# Patient Record
Sex: MALE | Race: WHITE | NOT HISPANIC OR LATINO | Employment: OTHER | URBAN - METROPOLITAN AREA
[De-identification: names, ages, dates, MRNs, and addresses within clinical notes are randomized per-mention and may not be internally consistent; named-entity substitution may affect disease eponyms.]

---

## 2017-05-10 ENCOUNTER — APPOINTMENT (OUTPATIENT)
Dept: MRI IMAGING | Facility: HOSPITAL | Age: 67
End: 2017-05-10
Attending: EMERGENCY MEDICINE

## 2017-05-10 ENCOUNTER — HOSPITAL ENCOUNTER (OUTPATIENT)
Facility: HOSPITAL | Age: 67
Setting detail: OBSERVATION
Discharge: HOME | End: 2017-05-13
Attending: EMERGENCY MEDICINE | Admitting: INTERNAL MEDICINE

## 2017-05-10 ENCOUNTER — APPOINTMENT (OUTPATIENT)
Dept: GENERAL RADIOLOGY | Facility: HOSPITAL | Age: 67
End: 2017-05-10

## 2017-05-10 DIAGNOSIS — E11.69 DIABETES MELLITUS TYPE 2 IN OBESE (HCC): ICD-10-CM

## 2017-05-10 DIAGNOSIS — I65.1 BASILAR ARTERY STENOSIS: ICD-10-CM

## 2017-05-10 DIAGNOSIS — E66.9 DIABETES MELLITUS TYPE 2 IN OBESE (HCC): ICD-10-CM

## 2017-05-10 DIAGNOSIS — I10 HYPERTENSION, ESSENTIAL: ICD-10-CM

## 2017-05-10 DIAGNOSIS — Z74.09 IMPAIRED MOBILITY AND ADLS: ICD-10-CM

## 2017-05-10 DIAGNOSIS — Z74.09 IMPAIRED FUNCTIONAL MOBILITY, BALANCE, GAIT, AND ENDURANCE: ICD-10-CM

## 2017-05-10 DIAGNOSIS — G45.9 TRANSIENT CEREBRAL ISCHEMIA, UNSPECIFIED TYPE: Primary | ICD-10-CM

## 2017-05-10 DIAGNOSIS — Z78.9 IMPAIRED MOBILITY AND ADLS: ICD-10-CM

## 2017-05-10 DIAGNOSIS — N28.9 RENAL INSUFFICIENCY: ICD-10-CM

## 2017-05-10 LAB
ALBUMIN SERPL-MCNC: 4.5 G/DL (ref 3.2–4.8)
ALBUMIN/GLOB SERPL: 1.4 G/DL (ref 1.5–2.5)
ALP SERPL-CCNC: 64 U/L (ref 25–100)
ALT SERPL W P-5'-P-CCNC: 21 U/L (ref 7–40)
ANION GAP SERPL CALCULATED.3IONS-SCNC: -5 MMOL/L (ref 3–11)
AST SERPL-CCNC: 24 U/L (ref 0–33)
BASOPHILS # BLD AUTO: 0.04 10*3/MM3 (ref 0–0.2)
BASOPHILS NFR BLD AUTO: 0.4 % (ref 0–1)
BILIRUB SERPL-MCNC: 0.4 MG/DL (ref 0.3–1.2)
BILIRUB UR QL STRIP: ABNORMAL
BUN BLD-MCNC: 22 MG/DL (ref 9–23)
BUN/CREAT SERPL: 12.9 (ref 7–25)
CALCIUM SPEC-SCNC: 10.3 MG/DL (ref 8.7–10.4)
CHLORIDE SERPL-SCNC: 106 MMOL/L (ref 99–109)
CLARITY UR: CLEAR
CO2 SERPL-SCNC: 37 MMOL/L (ref 20–31)
COLOR UR: YELLOW
CREAT BLD-MCNC: 1.7 MG/DL (ref 0.6–1.3)
DEPRECATED RDW RBC AUTO: 43.6 FL (ref 37–54)
EOSINOPHIL # BLD AUTO: 0.21 10*3/MM3 (ref 0.1–0.3)
EOSINOPHIL NFR BLD AUTO: 2.3 % (ref 0–3)
ERYTHROCYTE [DISTWIDTH] IN BLOOD BY AUTOMATED COUNT: 12.8 % (ref 11.3–14.5)
GFR SERPL CREATININE-BSD FRML MDRD: 41 ML/MIN/1.73
GLOBULIN UR ELPH-MCNC: 3.2 GM/DL
GLUCOSE BLD-MCNC: 88 MG/DL (ref 70–100)
GLUCOSE BLDC GLUCOMTR-MCNC: 101 MG/DL (ref 70–130)
GLUCOSE BLDC GLUCOMTR-MCNC: 101 MG/DL (ref 70–130)
GLUCOSE BLDC GLUCOMTR-MCNC: 115 MG/DL (ref 70–130)
GLUCOSE UR STRIP-MCNC: NEGATIVE MG/DL
HCT VFR BLD AUTO: 43.8 % (ref 38.9–50.9)
HGB BLD-MCNC: 14.7 G/DL (ref 13.1–17.5)
HGB UR QL STRIP.AUTO: NEGATIVE
HOLD SPECIMEN: NORMAL
HOLD SPECIMEN: NORMAL
IMM GRANULOCYTES # BLD: 0.03 10*3/MM3 (ref 0–0.03)
IMM GRANULOCYTES NFR BLD: 0.3 % (ref 0–0.6)
KETONES UR QL STRIP: NEGATIVE
LEUKOCYTE ESTERASE UR QL STRIP.AUTO: NEGATIVE
LYMPHOCYTES # BLD AUTO: 3.06 10*3/MM3 (ref 0.6–4.8)
LYMPHOCYTES NFR BLD AUTO: 33.4 % (ref 24–44)
MAGNESIUM SERPL-MCNC: 1.8 MG/DL (ref 1.3–2.7)
MCH RBC QN AUTO: 31.4 PG (ref 27–31)
MCHC RBC AUTO-ENTMCNC: 33.6 G/DL (ref 32–36)
MCV RBC AUTO: 93.6 FL (ref 80–99)
MONOCYTES # BLD AUTO: 0.66 10*3/MM3 (ref 0–1)
MONOCYTES NFR BLD AUTO: 7.2 % (ref 0–12)
NEUTROPHILS # BLD AUTO: 5.17 10*3/MM3 (ref 1.5–8.3)
NEUTROPHILS NFR BLD AUTO: 56.4 % (ref 41–71)
NITRITE UR QL STRIP: NEGATIVE
PH UR STRIP.AUTO: 5.5 [PH] (ref 5–8)
PLATELET # BLD AUTO: 217 10*3/MM3 (ref 150–450)
PMV BLD AUTO: 10.4 FL (ref 6–12)
POTASSIUM BLD-SCNC: 4.7 MMOL/L (ref 3.5–5.5)
PROT SERPL-MCNC: 7.7 G/DL (ref 5.7–8.2)
PROT UR QL STRIP: NEGATIVE
RBC # BLD AUTO: 4.68 10*6/MM3 (ref 4.2–5.76)
SODIUM BLD-SCNC: 138 MMOL/L (ref 132–146)
SP GR UR STRIP: >=1.03 (ref 1–1.03)
TROPONIN I SERPL-MCNC: 0 NG/ML (ref 0–0.07)
UROBILINOGEN UR QL STRIP: ABNORMAL
WBC NRBC COR # BLD: 9.17 10*3/MM3 (ref 3.5–10.8)
WHOLE BLOOD HOLD SPECIMEN: NORMAL
WHOLE BLOOD HOLD SPECIMEN: NORMAL

## 2017-05-10 PROCEDURE — G0378 HOSPITAL OBSERVATION PER HR: HCPCS

## 2017-05-10 PROCEDURE — 81003 URINALYSIS AUTO W/O SCOPE: CPT | Performed by: EMERGENCY MEDICINE

## 2017-05-10 PROCEDURE — 82962 GLUCOSE BLOOD TEST: CPT

## 2017-05-10 PROCEDURE — 84484 ASSAY OF TROPONIN QUANT: CPT

## 2017-05-10 PROCEDURE — 99220 PR INITIAL OBSERVATION CARE/DAY 70 MINUTES: CPT | Performed by: FAMILY MEDICINE

## 2017-05-10 PROCEDURE — 80053 COMPREHEN METABOLIC PANEL: CPT | Performed by: EMERGENCY MEDICINE

## 2017-05-10 PROCEDURE — 70548 MR ANGIOGRAPHY NECK W/DYE: CPT

## 2017-05-10 PROCEDURE — A9577 INJ MULTIHANCE: HCPCS | Performed by: EMERGENCY MEDICINE

## 2017-05-10 PROCEDURE — 85025 COMPLETE CBC W/AUTO DIFF WBC: CPT | Performed by: EMERGENCY MEDICINE

## 2017-05-10 PROCEDURE — 70544 MR ANGIOGRAPHY HEAD W/O DYE: CPT

## 2017-05-10 PROCEDURE — 93005 ELECTROCARDIOGRAM TRACING: CPT

## 2017-05-10 PROCEDURE — 0 GADOBENATE DIMEGLUMINE 529 MG/ML SOLUTION: Performed by: EMERGENCY MEDICINE

## 2017-05-10 PROCEDURE — 83735 ASSAY OF MAGNESIUM: CPT | Performed by: EMERGENCY MEDICINE

## 2017-05-10 PROCEDURE — 99283 EMERGENCY DEPT VISIT LOW MDM: CPT

## 2017-05-10 PROCEDURE — 70551 MRI BRAIN STEM W/O DYE: CPT

## 2017-05-10 PROCEDURE — 71010 HC CHEST PA OR AP: CPT

## 2017-05-10 RX ORDER — PRASUGREL 10 MG/1
10 TABLET, FILM COATED ORAL DAILY
Status: DISCONTINUED | OUTPATIENT
Start: 2017-05-11 | End: 2017-05-13 | Stop reason: HOSPADM

## 2017-05-10 RX ORDER — HYDROCHLOROTHIAZIDE 25 MG/1
25 TABLET ORAL DAILY
COMMUNITY

## 2017-05-10 RX ORDER — ASPIRIN 325 MG
325 TABLET ORAL DAILY
Status: DISCONTINUED | OUTPATIENT
Start: 2017-05-11 | End: 2017-05-13 | Stop reason: HOSPADM

## 2017-05-10 RX ORDER — OMEPRAZOLE 20 MG/1
20 CAPSULE, DELAYED RELEASE ORAL DAILY
Status: ON HOLD | COMMUNITY
End: 2017-06-30

## 2017-05-10 RX ORDER — SODIUM CHLORIDE 0.9 % (FLUSH) 0.9 %
10 SYRINGE (ML) INJECTION AS NEEDED
Status: DISCONTINUED | OUTPATIENT
Start: 2017-05-10 | End: 2017-05-10

## 2017-05-10 RX ORDER — TRAZODONE HYDROCHLORIDE 50 MG/1
75 TABLET ORAL NIGHTLY
Status: DISCONTINUED | OUTPATIENT
Start: 2017-05-10 | End: 2017-05-13 | Stop reason: HOSPADM

## 2017-05-10 RX ORDER — ASPIRIN 325 MG
325 TABLET ORAL ONCE
Status: COMPLETED | OUTPATIENT
Start: 2017-05-10 | End: 2017-05-10

## 2017-05-10 RX ORDER — ASPIRIN 300 MG/1
300 SUPPOSITORY RECTAL DAILY
Status: DISCONTINUED | OUTPATIENT
Start: 2017-05-11 | End: 2017-05-13 | Stop reason: HOSPADM

## 2017-05-10 RX ORDER — SODIUM CHLORIDE 0.9 % (FLUSH) 0.9 %
1-10 SYRINGE (ML) INJECTION AS NEEDED
Status: DISCONTINUED | OUTPATIENT
Start: 2017-05-10 | End: 2017-05-10

## 2017-05-10 RX ORDER — PANTOPRAZOLE SODIUM 40 MG/1
40 TABLET, DELAYED RELEASE ORAL
Status: DISCONTINUED | OUTPATIENT
Start: 2017-05-11 | End: 2017-05-13 | Stop reason: HOSPADM

## 2017-05-10 RX ORDER — GLIPIZIDE 10 MG/1
10 TABLET ORAL
COMMUNITY

## 2017-05-10 RX ORDER — LOSARTAN POTASSIUM 50 MG/1
100 TABLET ORAL DAILY
COMMUNITY
End: 2017-07-11 | Stop reason: DRUGHIGH

## 2017-05-10 RX ORDER — ASPIRIN 325 MG
325 TABLET ORAL DAILY
Status: DISCONTINUED | OUTPATIENT
Start: 2017-05-11 | End: 2017-05-10

## 2017-05-10 RX ORDER — TAMSULOSIN HYDROCHLORIDE 0.4 MG/1
0.4 CAPSULE ORAL NIGHTLY
Status: DISCONTINUED | OUTPATIENT
Start: 2017-05-10 | End: 2017-05-13 | Stop reason: HOSPADM

## 2017-05-10 RX ORDER — SODIUM CHLORIDE 0.9 % (FLUSH) 0.9 %
1-10 SYRINGE (ML) INJECTION AS NEEDED
Status: DISCONTINUED | OUTPATIENT
Start: 2017-05-10 | End: 2017-05-13 | Stop reason: HOSPADM

## 2017-05-10 RX ORDER — METOPROLOL TARTRATE 50 MG/1
50 TABLET, FILM COATED ORAL 2 TIMES DAILY
COMMUNITY
End: 2017-05-13 | Stop reason: HOSPADM

## 2017-05-10 RX ORDER — NICOTINE POLACRILEX 4 MG
15 LOZENGE BUCCAL
Status: DISCONTINUED | OUTPATIENT
Start: 2017-05-10 | End: 2017-05-13 | Stop reason: HOSPADM

## 2017-05-10 RX ORDER — PRASUGREL 10 MG/1
10 TABLET, FILM COATED ORAL DAILY
Status: ON HOLD | COMMUNITY
End: 2017-06-30

## 2017-05-10 RX ORDER — ATORVASTATIN CALCIUM 40 MG/1
80 TABLET, FILM COATED ORAL NIGHTLY
Status: DISCONTINUED | OUTPATIENT
Start: 2017-05-10 | End: 2017-05-10

## 2017-05-10 RX ORDER — TRAZODONE HYDROCHLORIDE 150 MG/1
150 TABLET ORAL NIGHTLY
COMMUNITY
End: 2017-07-11 | Stop reason: DRUGHIGH

## 2017-05-10 RX ORDER — TAMSULOSIN HYDROCHLORIDE 0.4 MG/1
0.4 CAPSULE ORAL NIGHTLY
COMMUNITY

## 2017-05-10 RX ORDER — ATORVASTATIN CALCIUM 40 MG/1
40 TABLET, FILM COATED ORAL NIGHTLY
Status: DISCONTINUED | OUTPATIENT
Start: 2017-05-10 | End: 2017-05-11

## 2017-05-10 RX ORDER — DOXYCYCLINE HYCLATE 100 MG/1
100 CAPSULE ORAL 2 TIMES DAILY
COMMUNITY
End: 2017-05-13 | Stop reason: HOSPADM

## 2017-05-10 RX ORDER — ROSUVASTATIN CALCIUM 10 MG/1
10 TABLET, COATED ORAL DAILY
Status: ON HOLD | COMMUNITY
End: 2017-05-13

## 2017-05-10 RX ORDER — ASPIRIN 300 MG/1
300 SUPPOSITORY RECTAL DAILY
Status: DISCONTINUED | OUTPATIENT
Start: 2017-05-11 | End: 2017-05-10

## 2017-05-10 RX ORDER — DEXTROSE MONOHYDRATE 25 G/50ML
25 INJECTION, SOLUTION INTRAVENOUS
Status: DISCONTINUED | OUTPATIENT
Start: 2017-05-10 | End: 2017-05-13 | Stop reason: HOSPADM

## 2017-05-10 RX ADMIN — TAMSULOSIN HYDROCHLORIDE 0.4 MG: 0.4 CAPSULE ORAL at 23:23

## 2017-05-10 RX ADMIN — GADOBENATE DIMEGLUMINE 20 ML: 529 INJECTION, SOLUTION INTRAVENOUS at 16:12

## 2017-05-10 RX ADMIN — TRAZODONE HYDROCHLORIDE 75 MG: 50 TABLET ORAL at 23:23

## 2017-05-10 RX ADMIN — ATORVASTATIN CALCIUM 40 MG: 40 TABLET, FILM COATED ORAL at 21:25

## 2017-05-10 RX ADMIN — ASPIRIN 325 MG ORAL TABLET 325 MG: 325 PILL ORAL at 18:54

## 2017-05-11 ENCOUNTER — APPOINTMENT (OUTPATIENT)
Dept: CARDIOLOGY | Facility: HOSPITAL | Age: 67
End: 2017-05-11
Attending: FAMILY MEDICINE

## 2017-05-11 PROBLEM — I65.1 BASILAR ARTERY STENOSIS: Status: ACTIVE | Noted: 2017-05-11

## 2017-05-11 LAB
ANION GAP SERPL CALCULATED.3IONS-SCNC: 5 MMOL/L (ref 3–11)
ARTICHOKE IGE QN: 97 MG/DL (ref 0–130)
BASOPHILS # BLD AUTO: 0.04 10*3/MM3 (ref 0–0.2)
BASOPHILS NFR BLD AUTO: 0.5 % (ref 0–1)
BH CV ECHO MEAS - AO ROOT AREA (BSA CORRECTED): 1.4
BH CV ECHO MEAS - AO ROOT AREA: 8 CM^2
BH CV ECHO MEAS - AO ROOT DIAM: 3.2 CM
BH CV ECHO MEAS - BSA(HAYCOCK): 2.4 M^2
BH CV ECHO MEAS - BSA: 2.2 M^2
BH CV ECHO MEAS - BZI_BMI: 37.3 KILOGRAMS/M^2
BH CV ECHO MEAS - BZI_METRIC_HEIGHT: 172.7 CM
BH CV ECHO MEAS - BZI_METRIC_WEIGHT: 111.1 KG
BH CV ECHO MEAS - CONTRAST EF (2CH): 51.6 ML/M^2
BH CV ECHO MEAS - CONTRAST EF 4CH: 58.3 ML/M^2
BH CV ECHO MEAS - EDV(CUBED): 93.6 ML
BH CV ECHO MEAS - EDV(MOD-SP2): 157 ML
BH CV ECHO MEAS - EDV(MOD-SP4): 168 ML
BH CV ECHO MEAS - EDV(TEICH): 94.4 ML
BH CV ECHO MEAS - EF(CUBED): 68.8 %
BH CV ECHO MEAS - EF(MOD-SP2): 51.6 %
BH CV ECHO MEAS - EF(MOD-SP4): 58.3 %
BH CV ECHO MEAS - EF(TEICH): 60.5 %
BH CV ECHO MEAS - ESV(CUBED): 29.2 ML
BH CV ECHO MEAS - ESV(MOD-SP2): 76 ML
BH CV ECHO MEAS - ESV(MOD-SP4): 70 ML
BH CV ECHO MEAS - ESV(TEICH): 37.3 ML
BH CV ECHO MEAS - FS: 32.2 %
BH CV ECHO MEAS - IVS/LVPW: 0.85
BH CV ECHO MEAS - IVSD: 1.3 CM
BH CV ECHO MEAS - LA DIMENSION: 4.2 CM
BH CV ECHO MEAS - LA/AO: 1.3
BH CV ECHO MEAS - LAT PEAK E' VEL: 8.1 CM/SEC
BH CV ECHO MEAS - LV DIASTOLIC VOL/BSA (35-75): 75.4 ML/M^2
BH CV ECHO MEAS - LV IVRT: 0.11 SEC
BH CV ECHO MEAS - LV MASS(C)D: 249.1 GRAMS
BH CV ECHO MEAS - LV MASS(C)DI: 111.8 GRAMS/M^2
BH CV ECHO MEAS - LV SYSTOLIC VOL/BSA (12-30): 31.4 ML/M^2
BH CV ECHO MEAS - LVIDD: 4.5 CM
BH CV ECHO MEAS - LVIDS: 3.1 CM
BH CV ECHO MEAS - LVLD AP2: 9 CM
BH CV ECHO MEAS - LVLD AP4: 9.1 CM
BH CV ECHO MEAS - LVLS AP2: 7.9 CM
BH CV ECHO MEAS - LVLS AP4: 8.2 CM
BH CV ECHO MEAS - LVOT AREA (M): 3.1 CM^2
BH CV ECHO MEAS - LVOT AREA: 3.1 CM^2
BH CV ECHO MEAS - LVOT DIAM: 2 CM
BH CV ECHO MEAS - LVPWD: 1.5 CM
BH CV ECHO MEAS - MED PEAK E' VEL: 4.93 CM/SEC
BH CV ECHO MEAS - MV A MAX VEL: 99.3 CM/SEC
BH CV ECHO MEAS - MV DEC TIME: 0.24 SEC
BH CV ECHO MEAS - MV E MAX VEL: 65.4 CM/SEC
BH CV ECHO MEAS - MV E/A: 0.66
BH CV ECHO MEAS - PA ACC SLOPE: 908.5 CM/SEC^2
BH CV ECHO MEAS - PA ACC TIME: 0.1 SEC
BH CV ECHO MEAS - PA PR(ACCEL): 35.4 MMHG
BH CV ECHO MEAS - SI(CUBED): 28.9 ML/M^2
BH CV ECHO MEAS - SI(MOD-SP2): 36.4 ML/M^2
BH CV ECHO MEAS - SI(MOD-SP4): 44 ML/M^2
BH CV ECHO MEAS - SI(TEICH): 25.6 ML/M^2
BH CV ECHO MEAS - SV(CUBED): 64.4 ML
BH CV ECHO MEAS - SV(MOD-SP2): 81 ML
BH CV ECHO MEAS - SV(MOD-SP4): 98 ML
BH CV ECHO MEAS - SV(TEICH): 57.1 ML
BH CV ECHO MEAS - TAPSE (>1.6): 2.9 CM2
BH CV XLRA - RV BASE: 4.8 CM
BH CV XLRA - RV LENGTH: 7.9 CM
BH CV XLRA - RV MID: 4.1 CM
BH CV XLRA - TDI S': 13.7 CM/SEC
BH CV XLRA MEAS LEFT CCA RATIO VEL: 73.5 CM/SEC
BH CV XLRA MEAS LEFT DIST CCA EDV: 16.5 CM/SEC
BH CV XLRA MEAS LEFT DIST CCA PSV: 73.5 CM/SEC
BH CV XLRA MEAS LEFT DIST ICA EDV: 12.4 CM/SEC
BH CV XLRA MEAS LEFT DIST ICA PSV: 41.9 CM/SEC
BH CV XLRA MEAS LEFT ICA RATIO VEL: 59.3 CM/SEC
BH CV XLRA MEAS LEFT ICA/CCA RATIO: 0.81
BH CV XLRA MEAS LEFT MID ICA EDV: 16.1 CM/SEC
BH CV XLRA MEAS LEFT MID ICA PSV: 59.3 CM/SEC
BH CV XLRA MEAS LEFT PROX CCA EDV: 18.9 CM/SEC
BH CV XLRA MEAS LEFT PROX CCA PSV: 131 CM/SEC
BH CV XLRA MEAS LEFT PROX ECA PSV: 78.6 CM/SEC
BH CV XLRA MEAS LEFT PROX ICA EDV: 12.6 CM/SEC
BH CV XLRA MEAS LEFT PROX ICA PSV: 57.8 CM/SEC
BH CV XLRA MEAS LEFT PROX SCLA PSV: 99.3 CM/SEC
BH CV XLRA MEAS LEFT VERTEBRAL A PSV: 39.3 CM/SEC
BH CV XLRA MEAS RIGHT DIST CCA EDV: 14.8 CM/SEC
BH CV XLRA MEAS RIGHT DIST CCA PSV: 61.6 CM/SEC
BH CV XLRA MEAS RIGHT DIST ICA EDV: 25.8 CM/SEC
BH CV XLRA MEAS RIGHT DIST ICA PSV: 70.6 CM/SEC
BH CV XLRA MEAS RIGHT MID ICA EDV: 22.7 CM/SEC
BH CV XLRA MEAS RIGHT MID ICA PSV: 63.2 CM/SEC
BH CV XLRA MEAS RIGHT PROX CCA EDV: 15.3 CM/SEC
BH CV XLRA MEAS RIGHT PROX CCA PSV: 68 CM/SEC
BH CV XLRA MEAS RIGHT PROX ECA PSV: 89 CM/SEC
BH CV XLRA MEAS RIGHT PROX ICA EDV: 19 CM/SEC
BH CV XLRA MEAS RIGHT PROX ICA PSV: 56.9 CM/SEC
BH CV XLRA MEAS RIGHT VERTEBRAL A PSV: 36.4 CM/SEC
BUN BLD-MCNC: 22 MG/DL (ref 9–23)
BUN/CREAT SERPL: 13.8 (ref 7–25)
CALCIUM SPEC-SCNC: 9.7 MG/DL (ref 8.7–10.4)
CHLORIDE SERPL-SCNC: 101 MMOL/L (ref 99–109)
CHOLEST SERPL-MCNC: 182 MG/DL (ref 0–200)
CO2 SERPL-SCNC: 29 MMOL/L (ref 20–31)
CREAT BLD-MCNC: 1.6 MG/DL (ref 0.6–1.3)
DEPRECATED RDW RBC AUTO: 43.3 FL (ref 37–54)
E/E' RATIO: 11.5
EOSINOPHIL # BLD AUTO: 0.23 10*3/MM3 (ref 0.1–0.3)
EOSINOPHIL NFR BLD AUTO: 2.9 % (ref 0–3)
ERYTHROCYTE [DISTWIDTH] IN BLOOD BY AUTOMATED COUNT: 12.7 % (ref 11.3–14.5)
GFR SERPL CREATININE-BSD FRML MDRD: 44 ML/MIN/1.73
GLUCOSE BLD-MCNC: 279 MG/DL (ref 70–100)
GLUCOSE BLDC GLUCOMTR-MCNC: 254 MG/DL (ref 70–130)
GLUCOSE BLDC GLUCOMTR-MCNC: 258 MG/DL (ref 70–130)
GLUCOSE BLDC GLUCOMTR-MCNC: 274 MG/DL (ref 70–130)
GLUCOSE BLDC GLUCOMTR-MCNC: 274 MG/DL (ref 70–130)
GLUCOSE BLDC GLUCOMTR-MCNC: 429 MG/DL (ref 70–130)
HBA1C MFR BLD: 9.1 % (ref 4.8–5.6)
HCT VFR BLD AUTO: 42.7 % (ref 38.9–50.9)
HDLC SERPL-MCNC: 33 MG/DL (ref 40–60)
HGB BLD-MCNC: 14.1 G/DL (ref 13.1–17.5)
IMM GRANULOCYTES # BLD: 0.04 10*3/MM3 (ref 0–0.03)
IMM GRANULOCYTES NFR BLD: 0.5 % (ref 0–0.6)
LEFT ATRIUM VOLUME INDEX: 36.3 ML/M2
LV EF 2D ECHO EST: 55 %
LYMPHOCYTES # BLD AUTO: 2.7 10*3/MM3 (ref 0.6–4.8)
LYMPHOCYTES NFR BLD AUTO: 34.6 % (ref 24–44)
MCH RBC QN AUTO: 30.9 PG (ref 27–31)
MCHC RBC AUTO-ENTMCNC: 33 G/DL (ref 32–36)
MCV RBC AUTO: 93.4 FL (ref 80–99)
MONOCYTES # BLD AUTO: 0.51 10*3/MM3 (ref 0–1)
MONOCYTES NFR BLD AUTO: 6.5 % (ref 0–12)
NEUTROPHILS # BLD AUTO: 4.29 10*3/MM3 (ref 1.5–8.3)
NEUTROPHILS NFR BLD AUTO: 55 % (ref 41–71)
PLATELET # BLD AUTO: 190 10*3/MM3 (ref 150–450)
PMV BLD AUTO: 10.7 FL (ref 6–12)
POTASSIUM BLD-SCNC: 4.8 MMOL/L (ref 3.5–5.5)
RBC # BLD AUTO: 4.57 10*6/MM3 (ref 4.2–5.76)
SODIUM BLD-SCNC: 135 MMOL/L (ref 132–146)
TRIGL SERPL-MCNC: 306 MG/DL (ref 0–150)
WBC NRBC COR # BLD: 7.81 10*3/MM3 (ref 3.5–10.8)

## 2017-05-11 PROCEDURE — 80061 LIPID PANEL: CPT | Performed by: FAMILY MEDICINE

## 2017-05-11 PROCEDURE — 25010000002 HEPARIN (PORCINE) PER 1000 UNITS: Performed by: INTERNAL MEDICINE

## 2017-05-11 PROCEDURE — 83036 HEMOGLOBIN GLYCOSYLATED A1C: CPT | Performed by: FAMILY MEDICINE

## 2017-05-11 PROCEDURE — 80048 BASIC METABOLIC PNL TOTAL CA: CPT | Performed by: FAMILY MEDICINE

## 2017-05-11 PROCEDURE — 99205 OFFICE O/P NEW HI 60 MIN: CPT | Performed by: PSYCHIATRY & NEUROLOGY

## 2017-05-11 PROCEDURE — 97165 OT EVAL LOW COMPLEX 30 MIN: CPT

## 2017-05-11 PROCEDURE — 99226 PR SBSQ OBSERVATION CARE/DAY 35 MINUTES: CPT | Performed by: INTERNAL MEDICINE

## 2017-05-11 PROCEDURE — G0108 DIAB MANAGE TRN  PER INDIV: HCPCS | Performed by: REGISTERED NURSE

## 2017-05-11 PROCEDURE — G8978 MOBILITY CURRENT STATUS: HCPCS

## 2017-05-11 PROCEDURE — G0378 HOSPITAL OBSERVATION PER HR: HCPCS

## 2017-05-11 PROCEDURE — 97161 PT EVAL LOW COMPLEX 20 MIN: CPT

## 2017-05-11 PROCEDURE — 93306 TTE W/DOPPLER COMPLETE: CPT

## 2017-05-11 PROCEDURE — 82962 GLUCOSE BLOOD TEST: CPT

## 2017-05-11 PROCEDURE — 85025 COMPLETE CBC W/AUTO DIFF WBC: CPT | Performed by: FAMILY MEDICINE

## 2017-05-11 PROCEDURE — 93880 EXTRACRANIAL BILAT STUDY: CPT

## 2017-05-11 PROCEDURE — G8980 MOBILITY D/C STATUS: HCPCS

## 2017-05-11 PROCEDURE — 63710000001 INSULIN LISPRO (HUMAN) PER 5 UNITS: Performed by: FAMILY MEDICINE

## 2017-05-11 RX ORDER — HEPARIN SODIUM 5000 [USP'U]/ML
5000 INJECTION, SOLUTION INTRAVENOUS; SUBCUTANEOUS EVERY 12 HOURS SCHEDULED
Status: DISCONTINUED | OUTPATIENT
Start: 2017-05-11 | End: 2017-05-13 | Stop reason: HOSPADM

## 2017-05-11 RX ORDER — ROSUVASTATIN CALCIUM 20 MG/1
20 TABLET, COATED ORAL NIGHTLY
Status: DISCONTINUED | OUTPATIENT
Start: 2017-05-11 | End: 2017-05-13 | Stop reason: HOSPADM

## 2017-05-11 RX ADMIN — HEPARIN SODIUM 5000 UNITS: 5000 INJECTION, SOLUTION INTRAVENOUS; SUBCUTANEOUS at 20:15

## 2017-05-11 RX ADMIN — INSULIN LISPRO 4 UNITS: 100 INJECTION, SOLUTION INTRAVENOUS; SUBCUTANEOUS at 08:11

## 2017-05-11 RX ADMIN — PANTOPRAZOLE SODIUM 40 MG: 40 TABLET, DELAYED RELEASE ORAL at 05:53

## 2017-05-11 RX ADMIN — TAMSULOSIN HYDROCHLORIDE 0.4 MG: 0.4 CAPSULE ORAL at 20:15

## 2017-05-11 RX ADMIN — PRASUGREL HYDROCHLORIDE 10 MG: 10 TABLET, FILM COATED ORAL at 08:10

## 2017-05-11 RX ADMIN — ASPIRIN 325 MG ORAL TABLET 325 MG: 325 PILL ORAL at 20:17

## 2017-05-11 RX ADMIN — INSULIN LISPRO 4 UNITS: 100 INJECTION, SOLUTION INTRAVENOUS; SUBCUTANEOUS at 11:59

## 2017-05-11 RX ADMIN — TRAZODONE HYDROCHLORIDE 75 MG: 50 TABLET ORAL at 20:15

## 2017-05-11 RX ADMIN — INSULIN LISPRO 4 UNITS: 100 INJECTION, SOLUTION INTRAVENOUS; SUBCUTANEOUS at 17:19

## 2017-05-11 RX ADMIN — INSULIN LISPRO 4 UNITS: 100 INJECTION, SOLUTION INTRAVENOUS; SUBCUTANEOUS at 20:15

## 2017-05-11 RX ADMIN — ROSUVASTATIN CALCIUM 20 MG: 20 TABLET, FILM COATED ORAL at 20:15

## 2017-05-12 LAB
GLUCOSE BLDC GLUCOMTR-MCNC: 263 MG/DL (ref 70–130)
GLUCOSE BLDC GLUCOMTR-MCNC: 288 MG/DL (ref 70–130)
GLUCOSE BLDC GLUCOMTR-MCNC: 306 MG/DL (ref 70–130)
GLUCOSE BLDC GLUCOMTR-MCNC: 392 MG/DL (ref 70–130)

## 2017-05-12 PROCEDURE — 63710000001 INSULIN LISPRO (HUMAN) PER 5 UNITS: Performed by: NURSE PRACTITIONER

## 2017-05-12 PROCEDURE — 82962 GLUCOSE BLOOD TEST: CPT

## 2017-05-12 PROCEDURE — 99213 OFFICE O/P EST LOW 20 MIN: CPT | Performed by: PSYCHIATRY & NEUROLOGY

## 2017-05-12 PROCEDURE — 25010000002 HEPARIN (PORCINE) PER 1000 UNITS: Performed by: INTERNAL MEDICINE

## 2017-05-12 PROCEDURE — G0378 HOSPITAL OBSERVATION PER HR: HCPCS

## 2017-05-12 PROCEDURE — 99225 PR SBSQ OBSERVATION CARE/DAY 25 MINUTES: CPT | Performed by: NURSE PRACTITIONER

## 2017-05-12 RX ORDER — METOPROLOL TARTRATE 50 MG/1
50 TABLET, FILM COATED ORAL EVERY 12 HOURS SCHEDULED
Status: DISCONTINUED | OUTPATIENT
Start: 2017-05-12 | End: 2017-05-12

## 2017-05-12 RX ORDER — LOSARTAN POTASSIUM 50 MG/1
50 TABLET ORAL
Status: DISCONTINUED | OUTPATIENT
Start: 2017-05-13 | End: 2017-05-13 | Stop reason: HOSPADM

## 2017-05-12 RX ORDER — AMLODIPINE BESYLATE 5 MG/1
5 TABLET ORAL
Status: DISCONTINUED | OUTPATIENT
Start: 2017-05-12 | End: 2017-05-13 | Stop reason: HOSPADM

## 2017-05-12 RX ORDER — LOSARTAN POTASSIUM 50 MG/1
50 TABLET ORAL ONCE
Status: COMPLETED | OUTPATIENT
Start: 2017-05-12 | End: 2017-05-12

## 2017-05-12 RX ADMIN — INSULIN LISPRO 7 UNITS: 100 INJECTION, SOLUTION INTRAVENOUS; SUBCUTANEOUS at 17:15

## 2017-05-12 RX ADMIN — PRASUGREL HYDROCHLORIDE 10 MG: 10 TABLET, FILM COATED ORAL at 09:30

## 2017-05-12 RX ADMIN — TAMSULOSIN HYDROCHLORIDE 0.4 MG: 0.4 CAPSULE ORAL at 21:50

## 2017-05-12 RX ADMIN — INSULIN LISPRO 4 UNITS: 100 INJECTION, SOLUTION INTRAVENOUS; SUBCUTANEOUS at 09:31

## 2017-05-12 RX ADMIN — METOPROLOL TARTRATE 25 MG: 25 TABLET ORAL at 21:49

## 2017-05-12 RX ADMIN — HEPARIN SODIUM 5000 UNITS: 5000 INJECTION, SOLUTION INTRAVENOUS; SUBCUTANEOUS at 09:30

## 2017-05-12 RX ADMIN — LOSARTAN POTASSIUM 50 MG: 50 TABLET, FILM COATED ORAL at 16:23

## 2017-05-12 RX ADMIN — TRAZODONE HYDROCHLORIDE 75 MG: 50 TABLET ORAL at 21:50

## 2017-05-12 RX ADMIN — METOPROLOL TARTRATE 25 MG: 25 TABLET ORAL at 14:19

## 2017-05-12 RX ADMIN — INSULIN LISPRO 6 UNITS: 100 INJECTION, SOLUTION INTRAVENOUS; SUBCUTANEOUS at 11:49

## 2017-05-12 RX ADMIN — INSULIN LISPRO 2 UNITS: 100 INJECTION, SOLUTION INTRAVENOUS; SUBCUTANEOUS at 17:14

## 2017-05-12 RX ADMIN — ROSUVASTATIN CALCIUM 20 MG: 20 TABLET, FILM COATED ORAL at 21:50

## 2017-05-12 RX ADMIN — INSULIN LISPRO 6 UNITS: 100 INJECTION, SOLUTION INTRAVENOUS; SUBCUTANEOUS at 21:48

## 2017-05-12 RX ADMIN — AMLODIPINE BESYLATE 5 MG: 5 TABLET ORAL at 14:19

## 2017-05-12 RX ADMIN — HEPARIN SODIUM 5000 UNITS: 5000 INJECTION, SOLUTION INTRAVENOUS; SUBCUTANEOUS at 21:48

## 2017-05-12 RX ADMIN — PANTOPRAZOLE SODIUM 40 MG: 40 TABLET, DELAYED RELEASE ORAL at 05:23

## 2017-05-12 RX ADMIN — ASPIRIN 325 MG ORAL TABLET 325 MG: 325 PILL ORAL at 09:30

## 2017-05-13 VITALS
HEART RATE: 78 BPM | TEMPERATURE: 97.5 F | RESPIRATION RATE: 18 BRPM | DIASTOLIC BLOOD PRESSURE: 78 MMHG | HEIGHT: 68 IN | BODY MASS INDEX: 37.13 KG/M2 | SYSTOLIC BLOOD PRESSURE: 140 MMHG | WEIGHT: 245 LBS | OXYGEN SATURATION: 93 %

## 2017-05-13 LAB
GLUCOSE BLDC GLUCOMTR-MCNC: 332 MG/DL (ref 70–130)
GLUCOSE BLDC GLUCOMTR-MCNC: 360 MG/DL (ref 70–130)

## 2017-05-13 PROCEDURE — 82962 GLUCOSE BLOOD TEST: CPT

## 2017-05-13 PROCEDURE — G0378 HOSPITAL OBSERVATION PER HR: HCPCS

## 2017-05-13 PROCEDURE — 25010000002 HEPARIN (PORCINE) PER 1000 UNITS: Performed by: INTERNAL MEDICINE

## 2017-05-13 PROCEDURE — 63710000001 INSULIN DETEMIR PER 5 UNITS: Performed by: INTERNAL MEDICINE

## 2017-05-13 RX ORDER — ROSUVASTATIN CALCIUM 20 MG/1
20 TABLET, COATED ORAL DAILY
Qty: 30 TABLET | Refills: 0 | Status: ON HOLD | OUTPATIENT
Start: 2017-05-13 | End: 2017-06-30

## 2017-05-13 RX ORDER — AMLODIPINE BESYLATE 5 MG/1
5 TABLET ORAL
Qty: 30 TABLET | Refills: 0 | Status: ON HOLD | OUTPATIENT
Start: 2017-05-13 | End: 2017-07-14

## 2017-05-13 RX ADMIN — INSULIN DETEMIR 5 UNITS: 100 INJECTION, SOLUTION SUBCUTANEOUS at 09:35

## 2017-05-13 RX ADMIN — HEPARIN SODIUM 5000 UNITS: 5000 INJECTION, SOLUTION INTRAVENOUS; SUBCUTANEOUS at 08:06

## 2017-05-13 RX ADMIN — INSULIN LISPRO 2 UNITS: 100 INJECTION, SOLUTION INTRAVENOUS; SUBCUTANEOUS at 11:36

## 2017-05-13 RX ADMIN — PANTOPRAZOLE SODIUM 40 MG: 40 TABLET, DELAYED RELEASE ORAL at 05:44

## 2017-05-13 RX ADMIN — INSULIN LISPRO 8 UNITS: 100 INJECTION, SOLUTION INTRAVENOUS; SUBCUTANEOUS at 11:37

## 2017-05-13 RX ADMIN — PRASUGREL HYDROCHLORIDE 10 MG: 10 TABLET, FILM COATED ORAL at 08:05

## 2017-05-13 RX ADMIN — METOPROLOL TARTRATE 25 MG: 25 TABLET ORAL at 08:05

## 2017-05-13 RX ADMIN — INSULIN LISPRO 2 UNITS: 100 INJECTION, SOLUTION INTRAVENOUS; SUBCUTANEOUS at 07:51

## 2017-05-13 RX ADMIN — INSULIN LISPRO 7 UNITS: 100 INJECTION, SOLUTION INTRAVENOUS; SUBCUTANEOUS at 07:51

## 2017-05-13 RX ADMIN — ASPIRIN 325 MG ORAL TABLET 325 MG: 325 PILL ORAL at 08:06

## 2017-05-13 RX ADMIN — LOSARTAN POTASSIUM 50 MG: 50 TABLET, FILM COATED ORAL at 08:06

## 2017-05-13 RX ADMIN — AMLODIPINE BESYLATE 5 MG: 5 TABLET ORAL at 08:05

## 2017-06-23 ENCOUNTER — TRANSCRIBE ORDERS (OUTPATIENT)
Dept: CARDIOLOGY | Facility: HOSPITAL | Age: 67
End: 2017-06-23

## 2017-06-23 DIAGNOSIS — R94.39 ABNORMAL STRESS TEST: Primary | ICD-10-CM

## 2017-06-30 ENCOUNTER — HOSPITAL ENCOUNTER (OUTPATIENT)
Facility: HOSPITAL | Age: 67
Setting detail: HOSPITAL OUTPATIENT SURGERY
Discharge: HOME | End: 2017-06-30
Attending: INTERNAL MEDICINE | Admitting: INTERNAL MEDICINE

## 2017-06-30 VITALS
HEART RATE: 51 BPM | WEIGHT: 247.58 LBS | BODY MASS INDEX: 37.52 KG/M2 | HEIGHT: 68 IN | DIASTOLIC BLOOD PRESSURE: 68 MMHG | OXYGEN SATURATION: 96 % | RESPIRATION RATE: 20 BRPM | SYSTOLIC BLOOD PRESSURE: 132 MMHG | TEMPERATURE: 98.3 F

## 2017-06-30 DIAGNOSIS — R94.39 ABNORMAL STRESS TEST: ICD-10-CM

## 2017-06-30 LAB
ANION GAP SERPL CALCULATED.3IONS-SCNC: 4 MMOL/L (ref 3–11)
BUN BLD-MCNC: 25 MG/DL (ref 9–23)
BUN/CREAT SERPL: 13.2 (ref 7–25)
CALCIUM SPEC-SCNC: 9.9 MG/DL (ref 8.7–10.4)
CHLORIDE SERPL-SCNC: 105 MMOL/L (ref 99–109)
CO2 SERPL-SCNC: 28 MMOL/L (ref 20–31)
CREAT BLD-MCNC: 1.9 MG/DL (ref 0.6–1.3)
DEPRECATED RDW RBC AUTO: 45.2 FL (ref 37–54)
ERYTHROCYTE [DISTWIDTH] IN BLOOD BY AUTOMATED COUNT: 13.2 % (ref 11.3–14.5)
GFR SERPL CREATININE-BSD FRML MDRD: 36 ML/MIN/1.73
GLUCOSE BLD-MCNC: 225 MG/DL (ref 70–100)
GLUCOSE BLDC GLUCOMTR-MCNC: 220 MG/DL (ref 70–130)
GLUCOSE BLDC GLUCOMTR-MCNC: 227 MG/DL (ref 70–130)
HCT VFR BLD AUTO: 39.3 % (ref 38.9–50.9)
HGB BLD-MCNC: 13.2 G/DL (ref 13.1–17.5)
MCH RBC QN AUTO: 31.5 PG (ref 27–31)
MCHC RBC AUTO-ENTMCNC: 33.6 G/DL (ref 32–36)
MCV RBC AUTO: 93.8 FL (ref 80–99)
PLATELET # BLD AUTO: 166 10*3/MM3 (ref 150–450)
PMV BLD AUTO: 10.8 FL (ref 6–12)
POTASSIUM BLD-SCNC: 4.2 MMOL/L (ref 3.5–5.5)
RBC # BLD AUTO: 4.19 10*6/MM3 (ref 4.2–5.76)
SODIUM BLD-SCNC: 137 MMOL/L (ref 132–146)
WBC NRBC COR # BLD: 6.53 10*3/MM3 (ref 3.5–10.8)

## 2017-06-30 PROCEDURE — 85027 COMPLETE CBC AUTOMATED: CPT | Performed by: INTERNAL MEDICINE

## 2017-06-30 PROCEDURE — 80048 BASIC METABOLIC PNL TOTAL CA: CPT | Performed by: INTERNAL MEDICINE

## 2017-06-30 PROCEDURE — 0 IOPAMIDOL PER 1 ML: Performed by: INTERNAL MEDICINE

## 2017-06-30 PROCEDURE — 93458 L HRT ARTERY/VENTRICLE ANGIO: CPT | Performed by: INTERNAL MEDICINE

## 2017-06-30 PROCEDURE — 25010000002 FENTANYL CITRATE (PF) 100 MCG/2ML SOLUTION: Performed by: INTERNAL MEDICINE

## 2017-06-30 PROCEDURE — C1769 GUIDE WIRE: HCPCS | Performed by: INTERNAL MEDICINE

## 2017-06-30 PROCEDURE — C1894 INTRO/SHEATH, NON-LASER: HCPCS | Performed by: INTERNAL MEDICINE

## 2017-06-30 PROCEDURE — 25010000002 MIDAZOLAM PER 1 MG: Performed by: INTERNAL MEDICINE

## 2017-06-30 PROCEDURE — 82962 GLUCOSE BLOOD TEST: CPT

## 2017-06-30 PROCEDURE — 25010000002 HEPARIN (PORCINE) PER 1000 UNITS: Performed by: INTERNAL MEDICINE

## 2017-06-30 PROCEDURE — 36415 COLL VENOUS BLD VENIPUNCTURE: CPT

## 2017-06-30 RX ORDER — FENTANYL CITRATE 50 UG/ML
INJECTION, SOLUTION INTRAMUSCULAR; INTRAVENOUS AS NEEDED
Status: DISCONTINUED | OUTPATIENT
Start: 2017-06-30 | End: 2017-06-30 | Stop reason: HOSPADM

## 2017-06-30 RX ORDER — MORPHINE SULFATE 2 MG/ML
1 INJECTION, SOLUTION INTRAMUSCULAR; INTRAVENOUS EVERY 4 HOURS PRN
Status: DISCONTINUED | OUTPATIENT
Start: 2017-06-30 | End: 2017-06-30 | Stop reason: HOSPADM

## 2017-06-30 RX ORDER — LIDOCAINE HYDROCHLORIDE 10 MG/ML
INJECTION, SOLUTION INFILTRATION; PERINEURAL AS NEEDED
Status: DISCONTINUED | OUTPATIENT
Start: 2017-06-30 | End: 2017-06-30 | Stop reason: HOSPADM

## 2017-06-30 RX ORDER — SODIUM CHLORIDE 9 MG/ML
250 INJECTION, SOLUTION INTRAVENOUS CONTINUOUS
Status: ACTIVE | OUTPATIENT
Start: 2017-06-30 | End: 2017-06-30

## 2017-06-30 RX ORDER — HYDROCODONE BITARTRATE AND ACETAMINOPHEN 5; 325 MG/1; MG/1
1 TABLET ORAL EVERY 4 HOURS PRN
Status: DISCONTINUED | OUTPATIENT
Start: 2017-06-30 | End: 2017-06-30 | Stop reason: HOSPADM

## 2017-06-30 RX ORDER — ASPIRIN 81 MG/1
81 TABLET ORAL DAILY
COMMUNITY

## 2017-06-30 RX ORDER — NALOXONE HCL 0.4 MG/ML
0.4 VIAL (ML) INJECTION
Status: DISCONTINUED | OUTPATIENT
Start: 2017-06-30 | End: 2017-06-30 | Stop reason: HOSPADM

## 2017-06-30 RX ORDER — MIDAZOLAM HYDROCHLORIDE 1 MG/ML
INJECTION INTRAMUSCULAR; INTRAVENOUS AS NEEDED
Status: DISCONTINUED | OUTPATIENT
Start: 2017-06-30 | End: 2017-06-30 | Stop reason: HOSPADM

## 2017-06-30 RX ORDER — FENOFIBRATE 48 MG/1
48 TABLET, COATED ORAL NIGHTLY
COMMUNITY

## 2017-06-30 RX ORDER — TEMAZEPAM 7.5 MG/1
7.5 CAPSULE ORAL NIGHTLY PRN
Status: DISCONTINUED | OUTPATIENT
Start: 2017-06-30 | End: 2017-06-30 | Stop reason: HOSPADM

## 2017-06-30 RX ORDER — ACETAMINOPHEN 325 MG/1
650 TABLET ORAL EVERY 4 HOURS PRN
Status: DISCONTINUED | OUTPATIENT
Start: 2017-06-30 | End: 2017-06-30 | Stop reason: HOSPADM

## 2017-06-30 RX ORDER — ALPRAZOLAM 0.25 MG/1
0.25 TABLET ORAL 3 TIMES DAILY PRN
Status: DISCONTINUED | OUTPATIENT
Start: 2017-06-30 | End: 2017-06-30 | Stop reason: HOSPADM

## 2017-06-30 RX ORDER — DEXTROSE MONOHYDRATE 25 G/50ML
25 INJECTION, SOLUTION INTRAVENOUS
Status: DISCONTINUED | OUTPATIENT
Start: 2017-06-30 | End: 2017-06-30 | Stop reason: HOSPADM

## 2017-06-30 RX ORDER — ASPIRIN 325 MG
325 TABLET, DELAYED RELEASE (ENTERIC COATED) ORAL DAILY
Status: DISCONTINUED | OUTPATIENT
Start: 2017-06-30 | End: 2017-06-30 | Stop reason: HOSPADM

## 2017-06-30 RX ORDER — NICOTINE POLACRILEX 4 MG
15 LOZENGE BUCCAL
Status: DISCONTINUED | OUTPATIENT
Start: 2017-06-30 | End: 2017-06-30 | Stop reason: HOSPADM

## 2017-06-30 RX ADMIN — ASPIRIN 325 MG: 325 TABLET, COATED ORAL at 08:19

## 2017-06-30 RX ADMIN — SODIUM CHLORIDE 250 ML/HR: 9 INJECTION, SOLUTION INTRAVENOUS at 10:40

## 2017-07-03 ENCOUNTER — OFFICE VISIT (OUTPATIENT)
Dept: CARDIAC SURGERY | Facility: CLINIC | Age: 67
End: 2017-07-03

## 2017-07-03 ENCOUNTER — PREP FOR SURGERY (OUTPATIENT)
Dept: OTHER | Facility: HOSPITAL | Age: 67
End: 2017-07-03

## 2017-07-03 VITALS
SYSTOLIC BLOOD PRESSURE: 130 MMHG | DIASTOLIC BLOOD PRESSURE: 80 MMHG | WEIGHT: 247 LBS | HEIGHT: 68 IN | TEMPERATURE: 96.9 F | BODY MASS INDEX: 37.44 KG/M2 | HEART RATE: 52 BPM

## 2017-07-03 DIAGNOSIS — I25.119 CORONARY ARTERY DISEASE INVOLVING NATIVE HEART WITH ANGINA PECTORIS, UNSPECIFIED VESSEL OR LESION TYPE (HCC): Primary | ICD-10-CM

## 2017-07-03 DIAGNOSIS — I25.119 CORONARY ARTERY DISEASE INVOLVING NATIVE CORONARY ARTERY OF NATIVE HEART WITH ANGINA PECTORIS (HCC): Primary | ICD-10-CM

## 2017-07-03 PROCEDURE — 99205 OFFICE O/P NEW HI 60 MIN: CPT | Performed by: THORACIC SURGERY (CARDIOTHORACIC VASCULAR SURGERY)

## 2017-07-03 RX ORDER — CHLORHEXIDINE GLUCONATE 500 MG/1
1 CLOTH TOPICAL EVERY 12 HOURS PRN
Status: CANCELLED | OUTPATIENT
Start: 2017-07-11

## 2017-07-03 RX ORDER — INSULIN GLARGINE 100 [IU]/ML
INJECTION, SOLUTION SUBCUTANEOUS DAILY
COMMUNITY
End: 2017-07-11

## 2017-07-03 RX ORDER — ASPIRIN 325 MG
325 TABLET ORAL NIGHTLY
Status: CANCELLED | OUTPATIENT
Start: 2017-07-11 | End: 2017-07-12

## 2017-07-03 RX ORDER — NITROGLYCERIN 0.4 MG/1
0.4 TABLET SUBLINGUAL
Status: CANCELLED | OUTPATIENT
Start: 2017-07-12

## 2017-07-03 RX ORDER — ACETAMINOPHEN 325 MG/1
650 TABLET ORAL EVERY 4 HOURS PRN
Status: CANCELLED | OUTPATIENT
Start: 2017-07-12

## 2017-07-03 RX ORDER — CHLORHEXIDINE GLUCONATE 500 MG/1
1 CLOTH TOPICAL EVERY 12 HOURS PRN
Status: CANCELLED | OUTPATIENT
Start: 2017-07-12

## 2017-07-03 RX ORDER — CHLORHEXIDINE GLUCONATE 0.12 MG/ML
15 RINSE ORAL ONCE
Status: CANCELLED | OUTPATIENT
Start: 2017-07-12 | End: 2017-07-12

## 2017-07-03 NOTE — PROGRESS NOTES
07/03/2017  Patient Information  Jakob Boogie                                                                                          [address] [facility] [address]  [facility] [address]   1950  'PCP/Referring Physician'  Provider Not In System  None  No ref. provider found    Chief Complaint   Patient presents with   • Consult     NP per Dr. Mosqueda for CAD        History of Present Illness:  Patient was referred to me to evaluate for coronary bypass grafting surgery.  Patient has a history of previous coronary artery stenting.  Recently he states with minimal activity he has substernal chest pain that he describes as tight and heavy.  He said it radiates into both arms and is associated with shortness of breath.  He states that on one occasion he had associated nausea but that is only been on 1 occasion.  When the pain occurs and he will rest he says it takes at least 10 minutes for the pain to totally resolved.  He has had a recent cardiac catheterization demonstrating significant coronary artery disease and I was asked see and evaluate for surgery. It should also be noted that he had had a transient ischemic attack in the past with slurred speech which resolved.  The carotids were evaluated and echocardiogram was performed and no cause of this transient ischemic attack was never elucidated.  He also has had no documented atrial fibrillation.      Patient Active Problem List   Diagnosis   • Transient cerebral ischemia   • Diabetes mellitus, type 2   • GERD (gastroesophageal reflux disease)   • Hypertension   • Lyme disease   • Hyperlipidemia   • Coronary artery disease   • CKD (chronic kidney disease), stage III   • Basilar artery stenosis     Past Medical History:   Diagnosis Date   • Abdominal hernia    • Arthritis    • CKD (chronic kidney disease), stage III    • Coronary artery disease    • Diabetes mellitus, type 2    • GERD (gastroesophageal reflux disease)    • Hyperlipidemia    • Hypertension    • TIA (transient  "ischemic attack) 05/13/2017     Past Surgical History:   Procedure Laterality Date   • AMPUTATION      left index   • CHOLECYSTECTOMY     • CORONARY STENT PLACEMENT         Current Outpatient Prescriptions:   •  amLODIPine (NORVASC) 5 MG tablet, Take 1 tablet by mouth Daily., Disp: 30 tablet, Rfl: 0  •  apixaban (ELIQUIS) 5 MG tablet tablet, Take 1 tablet by mouth Every 12 (Twelve) Hours., Disp: 60 tablet, Rfl: 0  •  aspirin 81 MG EC tablet, Take 81 mg by mouth Daily., Disp: , Rfl:   •  fenofibrate (TRICOR) 48 MG tablet, Take 48 mg by mouth Every Night., Disp: , Rfl:   •  hydrochlorothiazide (HYDRODIURIL) 25 MG tablet, Take 25 mg by mouth Daily., Disp: , Rfl:   •  insulin glargine (LANTUS) 100 UNIT/ML injection, Inject  under the skin Daily., Disp: , Rfl:   •  losartan (COZAAR) 50 MG tablet, Take 100 mg by mouth Daily., Disp: , Rfl:   •  metoprolol tartrate (LOPRESSOR) 25 MG tablet, Take 1 tablet by mouth Every 12 (Twelve) Hours., Disp: 60 tablet, Rfl: 0  •  tamsulosin (FLOMAX) 0.4 MG capsule 24 hr capsule, Take 1 capsule by mouth Every Night., Disp: , Rfl:   •  traZODone (DESYREL) 75 MG half tablet, Take 150 mg by mouth Every Night., Disp: , Rfl:   •  glipiZIDE (GLUCOTROL) 10 MG tablet, Take 10 mg by mouth 2 (Two) Times a Day Before Meals., Disp: , Rfl:   Allergies   Allergen Reactions   • Bactrim [Sulfamethoxazole-Trimethoprim] Hives     On patients arms, per wife \"look like mosquito bites\"     Social History     Social History   • Marital status:      Spouse name: N/A   • Number of children: 1   • Years of education: N/A     Occupational History   • self employed-[employer]      Social History Main Topics   • Smoking status: Never Smoker   • Smokeless tobacco: Never Used   • Alcohol use No   • Drug use: No   • Sexual activity: Defer     Other Topics Concern   • Not on file     Social History Narrative    Lives with his wife in [place], [place].      Family History   Problem Relation Age of Onset   • Stroke Mother  " "  • Heart disease Mother    • Diabetes Mother    • Heart disease Father    • Alzheimer's disease Father      Review of Systems   Constitution: Negative for chills, fever, malaise/fatigue, night sweats and weight loss.   HENT: Positive for hearing loss. Negative for headaches, odynophagia and sore throat.    Cardiovascular: Positive for chest pain and dyspnea on exertion. Negative for leg swelling, orthopnea and palpitations.   Respiratory: Negative for cough and hemoptysis.    Endocrine: Negative for cold intolerance, heat intolerance, polydipsia, polyphagia and polyuria.   Hematologic/Lymphatic: Bruises/bleeds easily.   Skin: Negative for itching and rash.   Musculoskeletal: Positive for back pain (bone spurs). Negative for joint pain, joint swelling and myalgias.   Gastrointestinal: Negative for abdominal pain, constipation, diarrhea, hematemesis, hematochezia, melena, nausea and vomiting.   Genitourinary: Negative for dysuria, frequency and hematuria.   Neurological: Positive for numbness (right knee to foot). Negative for focal weakness and seizures.   Psychiatric/Behavioral: Negative for suicidal ideas.   All other systems reviewed and are negative.    Vitals:    07/03/17 0748   BP: 130/80   BP Location: Left arm   Patient Position: Sitting   Pulse: 52   Temp: 96.9 °F (36.1 °C)   Weight: 247 lb (112 kg)   Height: 68\" (172.7 cm)      Physical Exam   CONSTITUTIONAL: Alert and conversant, Well dressed, Well nourished, No acute distress  EYES: Sclera clean, Anicteric, Pupils equal  ENT: No nasal deviation, Trachea midline  NECK: No neck masses, Supple  LUNGS: No wheezing, Cough, non-congested  HEART: No rubs, No murmurs  GI:  Soft, non-distended, No masses, Non tender  to palpation there is a large ventral hernia.  Bowel sounds normal.  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    Labs/Imaging:   I reviewed the heart catheterization images and report.  It demonstrates significant coronary artery disease.  His wife here also confirms the story of his angina.    Assessment/Plan:  DIABETES: I will manage the patient's blood sugars closely, both intraoperatively and postoperatively.  This patient may require a continuous insulin infusion to maintain strict serum glucose control.  I will coordinate the patient's glucose management with the hospital endocrinologist or hospitalist service if the management becomes problematic. The patient is aware that diabetes can complicate their postoperative course and contribute to infection or wound- healing issues.    HYPERTENSION: The patient has known hypertension. I will manage the blood pressure closely intraoperatively and postoperatively to maintain end organ perfusion, but also to mitigate against bleeding caused by extreme hypertension.  Will evaluate for beta blockade prior to anesthesia. Hypertension postoperatively will complicate bleeding problems.    HYPERLIPIDEMIA: The patient's statin medication will be continued up to and including the day of surgery. Dietary changes have been discussed.    Patient has underlying renal insufficiency which further complicates his heart surgery.  He also has had a previous stroke and the cause was never elucidated.  I have explained to the patient and his wife that coronary surgery is a complex operation that has with it a risk of stroke, bleeding, infection and death and they agreed to proceed.  I have indicated that his risk of stroke might be higher as he has had a previous stroke.  They agreed to proceed.  He will stop Eliquis a few days prior to surgery.         Patient Active Problem List    Diagnosis   • Transient cerebral ischemia   • Diabetes mellitus, type 2   • GERD (gastroesophageal reflux disease)   • Hypertension   • Lyme disease   • Hyperlipidemia   • Coronary artery disease   • CKD (chronic kidney disease), stage III   • Basilar artery stenosis     Signed by: Joey Mcmillan M.D.    7/3/2017    CC:  MD Teresa Measn, , editing for Joey Mcmillan M.D.    I, Joey Mcmillan MD, have read and agree with the editing done by Stephanie Monteiro, .

## 2017-07-11 ENCOUNTER — HOSPITAL ENCOUNTER (OUTPATIENT)
Dept: PULMONOLOGY | Facility: HOSPITAL | Age: 67
Discharge: HOME | End: 2017-07-11

## 2017-07-11 ENCOUNTER — HOSPITAL ENCOUNTER (OUTPATIENT)
Dept: GENERAL RADIOLOGY | Facility: HOSPITAL | Age: 67
Discharge: HOME | End: 2017-07-11
Admitting: PHYSICIAN ASSISTANT

## 2017-07-11 ENCOUNTER — APPOINTMENT (OUTPATIENT)
Dept: PREADMISSION TESTING | Facility: HOSPITAL | Age: 67
End: 2017-07-11

## 2017-07-11 VITALS — BODY MASS INDEX: 37.62 KG/M2 | WEIGHT: 248.24 LBS | HEIGHT: 68 IN | OXYGEN SATURATION: 97 %

## 2017-07-11 DIAGNOSIS — I25.119 CORONARY ARTERY DISEASE INVOLVING NATIVE CORONARY ARTERY OF NATIVE HEART WITH ANGINA PECTORIS (HCC): ICD-10-CM

## 2017-07-11 LAB
ABO GROUP BLD: NORMAL
ALBUMIN SERPL-MCNC: 4.2 G/DL (ref 3.2–4.8)
ALBUMIN/GLOB SERPL: 1.6 G/DL (ref 1.5–2.5)
ALP SERPL-CCNC: 64 U/L (ref 25–100)
ALT SERPL W P-5'-P-CCNC: 18 U/L (ref 7–40)
AMPHET+METHAMPHET UR QL: NEGATIVE
AMPHETAMINES UR QL: NEGATIVE
ANION GAP SERPL CALCULATED.3IONS-SCNC: 0 MMOL/L (ref 3–11)
APTT PPP: 26.1 SECONDS (ref 24–31)
AST SERPL-CCNC: 17 U/L (ref 0–33)
BARBITURATES UR QL SCN: NEGATIVE
BASOPHILS # BLD AUTO: 0.04 10*3/MM3 (ref 0–0.2)
BASOPHILS NFR BLD AUTO: 0.6 % (ref 0–1)
BENZODIAZ UR QL SCN: NEGATIVE
BILIRUB SERPL-MCNC: 0.4 MG/DL (ref 0.3–1.2)
BLD GP AB SCN SERPL QL: NEGATIVE
BUN BLD-MCNC: 25 MG/DL (ref 9–23)
BUN/CREAT SERPL: 13.9 (ref 7–25)
BUPRENORPHINE SERPL-MCNC: NEGATIVE NG/ML
CALCIUM SPEC-SCNC: 10 MG/DL (ref 8.7–10.4)
CANNABINOIDS SERPL QL: NEGATIVE
CHLORIDE SERPL-SCNC: 101 MMOL/L (ref 99–109)
CO2 SERPL-SCNC: 34 MMOL/L (ref 20–31)
COCAINE UR QL: NEGATIVE
CREAT BLD-MCNC: 1.8 MG/DL (ref 0.6–1.3)
DEPRECATED RDW RBC AUTO: 43.7 FL (ref 37–54)
EOSINOPHIL # BLD AUTO: 0.24 10*3/MM3 (ref 0–0.3)
EOSINOPHIL NFR BLD AUTO: 3.5 % (ref 0–3)
ERYTHROCYTE [DISTWIDTH] IN BLOOD BY AUTOMATED COUNT: 12.7 % (ref 11.3–14.5)
GFR SERPL CREATININE-BSD FRML MDRD: 38 ML/MIN/1.73
GLOBULIN UR ELPH-MCNC: 2.6 GM/DL
GLUCOSE BLD-MCNC: 251 MG/DL (ref 70–100)
HBA1C MFR BLD: 9.7 % (ref 4.8–5.6)
HCT VFR BLD AUTO: 38.9 % (ref 38.9–50.9)
HGB BLD-MCNC: 13 G/DL (ref 13.1–17.5)
IMM GRANULOCYTES # BLD: 0.02 10*3/MM3 (ref 0–0.03)
IMM GRANULOCYTES NFR BLD: 0.3 % (ref 0–0.6)
INR PPP: 1.02
LYMPHOCYTES # BLD AUTO: 2.58 10*3/MM3 (ref 0.6–4.8)
LYMPHOCYTES NFR BLD AUTO: 37.4 % (ref 24–44)
MAGNESIUM SERPL-MCNC: 1.9 MG/DL (ref 1.3–2.7)
MCH RBC QN AUTO: 31.4 PG (ref 27–31)
MCHC RBC AUTO-ENTMCNC: 33.4 G/DL (ref 32–36)
MCV RBC AUTO: 94 FL (ref 80–99)
METHADONE UR QL SCN: NEGATIVE
MONOCYTES # BLD AUTO: 0.47 10*3/MM3 (ref 0–1)
MONOCYTES NFR BLD AUTO: 6.8 % (ref 0–12)
NEUTROPHILS # BLD AUTO: 3.55 10*3/MM3 (ref 1.5–8.3)
NEUTROPHILS NFR BLD AUTO: 51.4 % (ref 41–71)
OPIATES UR QL: NEGATIVE
OXYCODONE UR QL SCN: NEGATIVE
PA ADP PRP-ACNC: 270 PRU
PCP UR QL SCN: NEGATIVE
PLATELET # BLD AUTO: 173 10*3/MM3 (ref 150–450)
PMV BLD AUTO: 10.9 FL (ref 6–12)
POTASSIUM BLD-SCNC: 4.7 MMOL/L (ref 3.5–5.5)
PROPOXYPH UR QL: NEGATIVE
PROT SERPL-MCNC: 6.8 G/DL (ref 5.7–8.2)
PROTHROMBIN TIME: 11.1 SECONDS (ref 9.6–11.5)
RBC # BLD AUTO: 4.14 10*6/MM3 (ref 4.2–5.76)
RH BLD: POSITIVE
SODIUM BLD-SCNC: 135 MMOL/L (ref 132–146)
TRICYCLICS UR QL SCN: NEGATIVE
WBC NRBC COR # BLD: 6.9 10*3/MM3 (ref 3.5–10.8)

## 2017-07-11 PROCEDURE — 80053 COMPREHEN METABOLIC PANEL: CPT | Performed by: PHYSICIAN ASSISTANT

## 2017-07-11 PROCEDURE — 80306 DRUG TEST PRSMV INSTRMNT: CPT | Performed by: PHYSICIAN ASSISTANT

## 2017-07-11 PROCEDURE — 85025 COMPLETE CBC W/AUTO DIFF WBC: CPT | Performed by: PHYSICIAN ASSISTANT

## 2017-07-11 PROCEDURE — 86901 BLOOD TYPING SEROLOGIC RH(D): CPT | Performed by: PHYSICIAN ASSISTANT

## 2017-07-11 PROCEDURE — 86920 COMPATIBILITY TEST SPIN: CPT

## 2017-07-11 PROCEDURE — 83036 HEMOGLOBIN GLYCOSYLATED A1C: CPT | Performed by: PHYSICIAN ASSISTANT

## 2017-07-11 PROCEDURE — 36415 COLL VENOUS BLD VENIPUNCTURE: CPT

## 2017-07-11 PROCEDURE — 85610 PROTHROMBIN TIME: CPT | Performed by: PHYSICIAN ASSISTANT

## 2017-07-11 PROCEDURE — 94010 BREATHING CAPACITY TEST: CPT

## 2017-07-11 PROCEDURE — 71020 HC CHEST PA AND LATERAL: CPT

## 2017-07-11 PROCEDURE — 86850 RBC ANTIBODY SCREEN: CPT | Performed by: PHYSICIAN ASSISTANT

## 2017-07-11 PROCEDURE — 94010 BREATHING CAPACITY TEST: CPT | Performed by: INTERNAL MEDICINE

## 2017-07-11 PROCEDURE — 85730 THROMBOPLASTIN TIME PARTIAL: CPT | Performed by: PHYSICIAN ASSISTANT

## 2017-07-11 PROCEDURE — 83735 ASSAY OF MAGNESIUM: CPT | Performed by: PHYSICIAN ASSISTANT

## 2017-07-11 PROCEDURE — 86900 BLOOD TYPING SEROLOGIC ABO: CPT | Performed by: PHYSICIAN ASSISTANT

## 2017-07-11 PROCEDURE — 85576 BLOOD PLATELET AGGREGATION: CPT | Performed by: PHYSICIAN ASSISTANT

## 2017-07-11 RX ORDER — ACETAMINOPHEN 500 MG
1000 TABLET ORAL EVERY 6 HOURS PRN
COMMUNITY

## 2017-07-11 RX ORDER — CHLORHEXIDINE GLUCONATE 500 MG/1
1 CLOTH TOPICAL EVERY 12 HOURS PRN
Status: ACTIVE | OUTPATIENT
Start: 2017-07-11

## 2017-07-11 RX ORDER — LOSARTAN POTASSIUM 100 MG/1
100 TABLET ORAL DAILY
COMMUNITY

## 2017-07-11 RX ORDER — TRAZODONE HYDROCHLORIDE 150 MG/1
150 TABLET ORAL NIGHTLY
COMMUNITY

## 2017-07-11 NOTE — PAT
Instructed patient to take 325 mg the night before heart surgery as per vidhi (from Dr frye office) since no CT surgeon's order in [facility].  Patient and/or family verbalized understanding.

## 2017-07-12 ENCOUNTER — HOSPITAL ENCOUNTER (INPATIENT)
Facility: HOSPITAL | Age: 67
LOS: 5 days | Discharge: HOME | End: 2017-07-17
Attending: THORACIC SURGERY (CARDIOTHORACIC VASCULAR SURGERY) | Admitting: THORACIC SURGERY (CARDIOTHORACIC VASCULAR SURGERY)

## 2017-07-12 ENCOUNTER — APPOINTMENT (OUTPATIENT)
Dept: GENERAL RADIOLOGY | Facility: HOSPITAL | Age: 67
End: 2017-07-12

## 2017-07-12 ENCOUNTER — ANESTHESIA (OUTPATIENT)
Dept: PERIOP | Facility: HOSPITAL | Age: 67
End: 2017-07-12

## 2017-07-12 ENCOUNTER — ANESTHESIA EVENT (OUTPATIENT)
Dept: PERIOP | Facility: HOSPITAL | Age: 67
End: 2017-07-12

## 2017-07-12 DIAGNOSIS — Z74.09 IMPAIRED FUNCTIONAL MOBILITY, BALANCE, GAIT, AND ENDURANCE: Primary | ICD-10-CM

## 2017-07-12 DIAGNOSIS — E11.69 TYPE 2 DIABETES MELLITUS WITH OTHER SPECIFIED COMPLICATION, WITHOUT LONG-TERM CURRENT USE OF INSULIN (HCC): ICD-10-CM

## 2017-07-12 DIAGNOSIS — I25.119 CORONARY ARTERY DISEASE INVOLVING NATIVE CORONARY ARTERY OF NATIVE HEART WITH ANGINA PECTORIS (HCC): ICD-10-CM

## 2017-07-12 PROBLEM — I25.10 CAD (CORONARY ARTERY DISEASE): Status: ACTIVE | Noted: 2017-07-12

## 2017-07-12 LAB
ABO GROUP BLD: NORMAL
ACT BLD: 120 SECONDS (ref 82–152)
ACT BLD: 131 SECONDS (ref 82–152)
ACT BLD: 489 SECONDS (ref 82–152)
ACT BLD: 522 SECONDS (ref 82–152)
ACT BLD: 533 SECONDS (ref 82–152)
ACT BLD: 593 SECONDS (ref 82–152)
ALBUMIN SERPL-MCNC: 3.5 G/DL (ref 3.2–4.8)
ALBUMIN SERPL-MCNC: 4.1 G/DL (ref 3.2–4.8)
ANION GAP SERPL CALCULATED.3IONS-SCNC: 3 MMOL/L (ref 3–11)
ANION GAP SERPL CALCULATED.3IONS-SCNC: 7 MMOL/L (ref 3–11)
APTT PPP: <24 SECONDS (ref 24–31)
ARTERIAL PATENCY WRIST A: ABNORMAL
ATMOSPHERIC PRESS: ABNORMAL MMHG
BACTERIA UR QL AUTO: NORMAL /HPF
BASE EXCESS BLDA CALC-SCNC: -0.5 MMOL/L (ref 0–2)
BASE EXCESS BLDA CALC-SCNC: -0.9 MMOL/L (ref 0–2)
BASE EXCESS BLDA CALC-SCNC: -1 MMOL/L (ref -5–5)
BASE EXCESS BLDA CALC-SCNC: -2.5 MMOL/L (ref 0–2)
BASE EXCESS BLDA CALC-SCNC: -3 MMOL/L (ref -5–5)
BASE EXCESS BLDA CALC-SCNC: -3 MMOL/L (ref -5–5)
BASE EXCESS BLDA CALC-SCNC: 1 MMOL/L (ref -5–5)
BASE EXCESS BLDA CALC-SCNC: 1 MMOL/L (ref -5–5)
BASE EXCESS BLDA CALC-SCNC: 2 MMOL/L (ref -5–5)
BASE EXCESS BLDA CALC-SCNC: 2 MMOL/L (ref -5–5)
BDY SITE: ABNORMAL
BILIRUB UR QL STRIP: NEGATIVE
BUN BLD-MCNC: 21 MG/DL (ref 9–23)
BUN BLD-MCNC: 22 MG/DL (ref 9–23)
BUN/CREAT SERPL: 11.1 (ref 7–25)
BUN/CREAT SERPL: 12.2 (ref 7–25)
CA-I BLDA-SCNC: 1.14 MMOL/L (ref 1.2–1.32)
CA-I BLDA-SCNC: 1.16 MMOL/L (ref 1.2–1.32)
CA-I BLDA-SCNC: 1.18 MMOL/L (ref 1.2–1.32)
CA-I BLDA-SCNC: 1.19 MMOL/L (ref 1.2–1.32)
CA-I BLDA-SCNC: 1.21 MMOL/L (ref 1.2–1.32)
CA-I BLDA-SCNC: 1.25 MMOL/L (ref 1.2–1.32)
CA-I BLDA-SCNC: 1.66 MMOL/L (ref 1.2–1.32)
CA-I SERPL ISE-MCNC: 1.37 MMOL/L (ref 1.12–1.32)
CALCIUM SPEC-SCNC: 10.1 MG/DL (ref 8.7–10.4)
CALCIUM SPEC-SCNC: 10.3 MG/DL (ref 8.7–10.4)
CHLORIDE SERPL-SCNC: 105 MMOL/L (ref 99–109)
CHLORIDE SERPL-SCNC: 106 MMOL/L (ref 99–109)
CLARITY UR: CLEAR
CO2 BLDA-SCNC: 23.9 MMOL/L (ref 22–33)
CO2 BLDA-SCNC: 25 MMOL/L (ref 24–29)
CO2 BLDA-SCNC: 25 MMOL/L (ref 24–29)
CO2 BLDA-SCNC: 26 MMOL/L (ref 24–29)
CO2 BLDA-SCNC: 26.1 MMOL/L (ref 22–33)
CO2 BLDA-SCNC: 26.6 MMOL/L (ref 22–33)
CO2 BLDA-SCNC: 28 MMOL/L (ref 24–29)
CO2 BLDA-SCNC: 28 MMOL/L (ref 24–29)
CO2 BLDA-SCNC: 29 MMOL/L (ref 24–29)
CO2 BLDA-SCNC: 29 MMOL/L (ref 24–29)
CO2 SERPL-SCNC: 23 MMOL/L (ref 20–31)
CO2 SERPL-SCNC: 28 MMOL/L (ref 20–31)
COHGB MFR BLD: 0.4 % (ref 0–2)
COHGB MFR BLD: 0.5 % (ref 0–2)
COHGB MFR BLD: 0.6 % (ref 0–2)
COLOR UR: YELLOW
CREAT BLD-MCNC: 1.8 MG/DL (ref 0.6–1.3)
CREAT BLD-MCNC: 1.9 MG/DL (ref 0.6–1.3)
DEPRECATED RDW RBC AUTO: 43.1 FL (ref 37–54)
DEPRECATED RDW RBC AUTO: 43.8 FL (ref 37–54)
ERYTHROCYTE [DISTWIDTH] IN BLOOD BY AUTOMATED COUNT: 12.6 % (ref 11.3–14.5)
ERYTHROCYTE [DISTWIDTH] IN BLOOD BY AUTOMATED COUNT: 12.9 % (ref 11.3–14.5)
GFR SERPL CREATININE-BSD FRML MDRD: 36 ML/MIN/1.73
GFR SERPL CREATININE-BSD FRML MDRD: 38 ML/MIN/1.73
GLUCOSE BLD-MCNC: 128 MG/DL (ref 70–100)
GLUCOSE BLD-MCNC: 175 MG/DL (ref 70–100)
GLUCOSE BLDC GLUCOMTR-MCNC: 116 MG/DL (ref 70–130)
GLUCOSE BLDC GLUCOMTR-MCNC: 151 MG/DL (ref 70–130)
GLUCOSE BLDC GLUCOMTR-MCNC: 156 MG/DL (ref 70–130)
GLUCOSE BLDC GLUCOMTR-MCNC: 158 MG/DL (ref 70–130)
GLUCOSE BLDC GLUCOMTR-MCNC: 163 MG/DL (ref 70–130)
GLUCOSE BLDC GLUCOMTR-MCNC: 169 MG/DL (ref 70–130)
GLUCOSE BLDC GLUCOMTR-MCNC: 169 MG/DL (ref 70–130)
GLUCOSE BLDC GLUCOMTR-MCNC: 170 MG/DL (ref 70–130)
GLUCOSE BLDC GLUCOMTR-MCNC: 178 MG/DL (ref 70–130)
GLUCOSE BLDC GLUCOMTR-MCNC: 179 MG/DL (ref 70–130)
GLUCOSE BLDC GLUCOMTR-MCNC: 181 MG/DL (ref 70–130)
GLUCOSE BLDC GLUCOMTR-MCNC: 255 MG/DL (ref 70–130)
GLUCOSE UR STRIP-MCNC: NEGATIVE MG/DL
HCO3 BLDA-SCNC: 22.7 MMOL/L (ref 20–26)
HCO3 BLDA-SCNC: 23.8 MMOL/L (ref 22–26)
HCO3 BLDA-SCNC: 24 MMOL/L (ref 22–26)
HCO3 BLDA-SCNC: 24 MMOL/L (ref 22–26)
HCO3 BLDA-SCNC: 24.7 MMOL/L (ref 20–26)
HCO3 BLDA-SCNC: 25.2 MMOL/L (ref 20–26)
HCO3 BLDA-SCNC: 26.8 MMOL/L (ref 22–26)
HCO3 BLDA-SCNC: 26.9 MMOL/L (ref 22–26)
HCO3 BLDA-SCNC: 27.4 MMOL/L (ref 22–26)
HCO3 BLDA-SCNC: 27.4 MMOL/L (ref 22–26)
HCT VFR BLD AUTO: 28.8 % (ref 38.9–50.9)
HCT VFR BLD AUTO: 30.4 % (ref 38.9–50.9)
HCT VFR BLD AUTO: 33.1 % (ref 38.9–50.9)
HCT VFR BLD CALC: 31.5 %
HCT VFR BLD CALC: 31.6 %
HCT VFR BLD CALC: 32.9 %
HCT VFR BLDA CALC: 25 % (ref 38–51)
HCT VFR BLDA CALC: 26 % (ref 38–51)
HCT VFR BLDA CALC: 26 % (ref 38–51)
HCT VFR BLDA CALC: 27 % (ref 38–51)
HCT VFR BLDA CALC: 27 % (ref 38–51)
HCT VFR BLDA CALC: 32 % (ref 38–51)
HCT VFR BLDA CALC: 35 % (ref 38–51)
HGB BLD-MCNC: 10.5 G/DL (ref 13.1–17.5)
HGB BLD-MCNC: 11 G/DL (ref 13.1–17.5)
HGB BLD-MCNC: 9.8 G/DL (ref 13.1–17.5)
HGB BLDA-MCNC: 10.3 G/DL (ref 13.5–17.5)
HGB BLDA-MCNC: 10.3 G/DL (ref 13.5–17.5)
HGB BLDA-MCNC: 10.7 G/DL (ref 13.5–17.5)
HGB BLDA-MCNC: 10.9 G/DL (ref 12–17)
HGB BLDA-MCNC: 11.9 G/DL (ref 12–17)
HGB BLDA-MCNC: 8.5 G/DL (ref 12–17)
HGB BLDA-MCNC: 8.8 G/DL (ref 12–17)
HGB BLDA-MCNC: 8.8 G/DL (ref 12–17)
HGB BLDA-MCNC: 9.2 G/DL (ref 12–17)
HGB BLDA-MCNC: 9.2 G/DL (ref 12–17)
HGB UR QL STRIP.AUTO: ABNORMAL
HOROWITZ INDEX BLD+IHG-RTO: 100 %
HOROWITZ INDEX BLD+IHG-RTO: 40 %
HOROWITZ INDEX BLD+IHG-RTO: 40 %
HYALINE CASTS UR QL AUTO: NORMAL /LPF
INR PPP: 1.15
KETONES UR QL STRIP: NEGATIVE
LEUKOCYTE ESTERASE UR QL STRIP.AUTO: NEGATIVE
MAGNESIUM SERPL-MCNC: 2.9 MG/DL (ref 1.3–2.7)
MAGNESIUM SERPL-MCNC: 3.6 MG/DL (ref 1.3–2.7)
MCH RBC QN AUTO: 31 PG (ref 27–31)
MCH RBC QN AUTO: 31.8 PG (ref 27–31)
MCHC RBC AUTO-ENTMCNC: 33.2 G/DL (ref 32–36)
MCHC RBC AUTO-ENTMCNC: 34 G/DL (ref 32–36)
MCV RBC AUTO: 93.2 FL (ref 80–99)
MCV RBC AUTO: 93.5 FL (ref 80–99)
METHGB BLD QL: 1.3 % (ref 0–1.5)
METHGB BLD QL: 1.3 % (ref 0–1.5)
METHGB BLD QL: 1.7 % (ref 0–1.5)
MODALITY: ABNORMAL
NITRITE UR QL STRIP: NEGATIVE
OXYHGB MFR BLDV: 91.5 % (ref 94–99)
OXYHGB MFR BLDV: 91.6 % (ref 94–99)
OXYHGB MFR BLDV: 97.1 % (ref 94–99)
PCO2 BLDA: 39.4 MM HG (ref 35–45)
PCO2 BLDA: 39.9 MM HG (ref 35–48)
PCO2 BLDA: 45.3 MM HG (ref 35–48)
PCO2 BLDA: 46.3 MM HG (ref 35–48)
PCO2 BLDA: 46.8 MM HG (ref 35–45)
PCO2 BLDA: 48 MM HG (ref 35–45)
PCO2 BLDA: 50.1 MM HG (ref 35–45)
PCO2 BLDA: 50.1 MM HG (ref 35–45)
PCO2 BLDA: 51.9 MM HG (ref 35–45)
PCO2 BLDA: 52.1 MM HG (ref 35–45)
PH BLDA: 7.28 PH UNITS (ref 7.35–7.6)
PH BLDA: 7.29 PH UNITS (ref 7.35–7.6)
PH BLDA: 7.32 PH UNITS (ref 7.35–7.6)
PH BLDA: 7.32 PH UNITS (ref 7.35–7.6)
PH BLDA: 7.34 PH UNITS (ref 7.35–7.45)
PH BLDA: 7.34 PH UNITS (ref 7.35–7.45)
PH BLDA: 7.36 PH UNITS (ref 7.35–7.45)
PH BLDA: 7.36 PH UNITS (ref 7.35–7.6)
PH BLDA: 7.38 PH UNITS (ref 7.35–7.6)
PH BLDA: 7.39 PH UNITS (ref 7.35–7.6)
PH UR STRIP.AUTO: 6 [PH] (ref 5–8)
PHOSPHATE SERPL-MCNC: 2.4 MG/DL (ref 2.4–5.1)
PHOSPHATE SERPL-MCNC: 3.4 MG/DL (ref 2.4–5.1)
PLATELET # BLD AUTO: 131 10*3/MM3 (ref 150–450)
PLATELET # BLD AUTO: 138 10*3/MM3 (ref 150–450)
PMV BLD AUTO: 10.6 FL (ref 6–12)
PMV BLD AUTO: 11.2 FL (ref 6–12)
PO2 BLDA: 172 MMHG (ref 80–105)
PO2 BLDA: 190 MM HG (ref 83–108)
PO2 BLDA: 276 MMHG (ref 80–105)
PO2 BLDA: 428 MMHG (ref 80–105)
PO2 BLDA: 429 MMHG (ref 80–105)
PO2 BLDA: 439 MMHG (ref 80–105)
PO2 BLDA: 54 MMHG (ref 80–105)
PO2 BLDA: 572 MMHG (ref 80–105)
PO2 BLDA: 70.7 MM HG (ref 83–108)
PO2 BLDA: 72.5 MM HG (ref 83–108)
POTASSIUM BLD-SCNC: 3.9 MMOL/L (ref 3.5–5.5)
POTASSIUM BLD-SCNC: 4 MMOL/L (ref 3.5–5.5)
POTASSIUM BLD-SCNC: 4.3 MMOL/L (ref 3.5–5.5)
POTASSIUM BLDA-SCNC: 3.6 MMOL/L (ref 3.5–4.9)
POTASSIUM BLDA-SCNC: 4.2 MMOL/L (ref 3.5–4.9)
POTASSIUM BLDA-SCNC: 4.3 MMOL/L (ref 3.5–4.9)
POTASSIUM BLDA-SCNC: 4.4 MMOL/L (ref 3.5–4.9)
POTASSIUM BLDA-SCNC: 4.6 MMOL/L (ref 3.5–4.9)
POTASSIUM BLDA-SCNC: 4.7 MMOL/L (ref 3.5–4.9)
POTASSIUM BLDA-SCNC: 4.7 MMOL/L (ref 3.5–4.9)
PROT UR QL STRIP: NEGATIVE
PROTHROMBIN TIME: 12.6 SECONDS (ref 9.6–11.5)
RBC # BLD AUTO: 3.08 10*6/MM3 (ref 4.2–5.76)
RBC # BLD AUTO: 3.55 10*6/MM3 (ref 4.2–5.76)
RBC # UR: NORMAL /HPF
REF LAB TEST METHOD: NORMAL
RH BLD: POSITIVE
SAO2 % BLDA: 100 % (ref 95–98)
SAO2 % BLDA: 83 % (ref 95–98)
SAO2 % BLDCOA: 91.5 %
SAO2 % BLDCOA: 91.6 %
SODIUM BLD-SCNC: 136 MMOL/L (ref 132–146)
SODIUM BLD-SCNC: 136 MMOL/L (ref 132–146)
SODIUM BLDA-SCNC: 137 MMOL/L (ref 138–146)
SODIUM BLDA-SCNC: 138 MMOL/L (ref 138–146)
SODIUM BLDA-SCNC: 139 MMOL/L (ref 138–146)
SP GR UR STRIP: 1.01 (ref 1–1.03)
SQUAMOUS #/AREA URNS HPF: NORMAL /HPF
UROBILINOGEN UR QL STRIP: ABNORMAL
WBC NRBC COR # BLD: 7.6 10*3/MM3 (ref 3.5–10.8)
WBC NRBC COR # BLD: 8.91 10*3/MM3 (ref 3.5–10.8)
WBC UR QL AUTO: NORMAL /HPF

## 2017-07-12 PROCEDURE — 25010000002 FUROSEMIDE PER 20 MG

## 2017-07-12 PROCEDURE — 25010000002 ALBUMIN HUMAN 5% PER 50 ML: Performed by: THORACIC SURGERY (CARDIOTHORACIC VASCULAR SURGERY)

## 2017-07-12 PROCEDURE — 94799 UNLISTED PULMONARY SVC/PX: CPT

## 2017-07-12 PROCEDURE — 86901 BLOOD TYPING SEROLOGIC RH(D): CPT

## 2017-07-12 PROCEDURE — 33519 CABG ARTERY-VEIN THREE: CPT | Performed by: PHYSICIAN ASSISTANT

## 2017-07-12 PROCEDURE — 25010000002 HEPARIN (PORCINE) PER 1000 UNITS

## 2017-07-12 PROCEDURE — 82803 BLOOD GASES ANY COMBINATION: CPT

## 2017-07-12 PROCEDURE — 36430 TRANSFUSION BLD/BLD COMPNT: CPT

## 2017-07-12 PROCEDURE — 25010000002 CEFUROXIME: Performed by: PHYSICIAN ASSISTANT

## 2017-07-12 PROCEDURE — 33508 ENDOSCOPIC VEIN HARVEST: CPT | Performed by: THORACIC SURGERY (CARDIOTHORACIC VASCULAR SURGERY)

## 2017-07-12 PROCEDURE — 25010000002 VANCOMYCIN PER 500 MG: Performed by: THORACIC SURGERY (CARDIOTHORACIC VASCULAR SURGERY)

## 2017-07-12 PROCEDURE — 82805 BLOOD GASES W/O2 SATURATION: CPT | Performed by: THORACIC SURGERY (CARDIOTHORACIC VASCULAR SURGERY)

## 2017-07-12 PROCEDURE — A4648 IMPLANTABLE TISSUE MARKER: HCPCS | Performed by: THORACIC SURGERY (CARDIOTHORACIC VASCULAR SURGERY)

## 2017-07-12 PROCEDURE — 25010000002 POTASSIUM CHLORIDE PER 2 MEQ OF POTASSIUM

## 2017-07-12 PROCEDURE — 02100Z9 BYPASS CORONARY ARTERY, ONE ARTERY FROM LEFT INTERNAL MAMMARY, OPEN APPROACH: ICD-10-PCS | Performed by: THORACIC SURGERY (CARDIOTHORACIC VASCULAR SURGERY)

## 2017-07-12 PROCEDURE — 25010000002 HEPARIN (PORCINE) PER 1000 UNITS: Performed by: ANESTHESIOLOGY

## 2017-07-12 PROCEDURE — 84132 ASSAY OF SERUM POTASSIUM: CPT

## 2017-07-12 PROCEDURE — C1751 CATH, INF, PER/CENT/MIDLINE: HCPCS | Performed by: ANESTHESIOLOGY

## 2017-07-12 PROCEDURE — 94760 N-INVAS EAR/PLS OXIMETRY 1: CPT

## 2017-07-12 PROCEDURE — 82330 ASSAY OF CALCIUM: CPT | Performed by: PHYSICIAN ASSISTANT

## 2017-07-12 PROCEDURE — 5A1221Z PERFORMANCE OF CARDIAC OUTPUT, CONTINUOUS: ICD-10-PCS | Performed by: THORACIC SURGERY (CARDIOTHORACIC VASCULAR SURGERY)

## 2017-07-12 PROCEDURE — P9041 ALBUMIN (HUMAN),5%, 50ML: HCPCS | Performed by: PHYSICIAN ASSISTANT

## 2017-07-12 PROCEDURE — P9041 ALBUMIN (HUMAN),5%, 50ML: HCPCS | Performed by: THORACIC SURGERY (CARDIOTHORACIC VASCULAR SURGERY)

## 2017-07-12 PROCEDURE — 85347 COAGULATION TIME ACTIVATED: CPT

## 2017-07-12 PROCEDURE — 84295 ASSAY OF SERUM SODIUM: CPT

## 2017-07-12 PROCEDURE — 80069 RENAL FUNCTION PANEL: CPT | Performed by: PHYSICIAN ASSISTANT

## 2017-07-12 PROCEDURE — 25010000002 MAGNESIUM SULFATE PER 500 MG OF MAGNESIUM

## 2017-07-12 PROCEDURE — 83735 ASSAY OF MAGNESIUM: CPT | Performed by: PHYSICIAN ASSISTANT

## 2017-07-12 PROCEDURE — 82805 BLOOD GASES W/O2 SATURATION: CPT | Performed by: PHYSICIAN ASSISTANT

## 2017-07-12 PROCEDURE — 25010000002 MORPHINE SULFATE (PF) 2 MG/ML SOLUTION: Performed by: THORACIC SURGERY (CARDIOTHORACIC VASCULAR SURGERY)

## 2017-07-12 PROCEDURE — 25010000002 PROPOFOL 1000 MG/ML EMULSION: Performed by: THORACIC SURGERY (CARDIOTHORACIC VASCULAR SURGERY)

## 2017-07-12 PROCEDURE — 25010000002 MIDAZOLAM PER 1 MG: Performed by: ANESTHESIOLOGY

## 2017-07-12 PROCEDURE — 85027 COMPLETE CBC AUTOMATED: CPT | Performed by: PHYSICIAN ASSISTANT

## 2017-07-12 PROCEDURE — 25010000002 DOPAMINE PER 40 MG: Performed by: ANESTHESIOLOGY

## 2017-07-12 PROCEDURE — 86900 BLOOD TYPING SEROLOGIC ABO: CPT

## 2017-07-12 PROCEDURE — P9034 PLATELETS, PHERESIS: HCPCS

## 2017-07-12 PROCEDURE — 33533 CABG ARTERIAL SINGLE: CPT | Performed by: THORACIC SURGERY (CARDIOTHORACIC VASCULAR SURGERY)

## 2017-07-12 PROCEDURE — 25010000002 PHENYLEPHRINE PER 1 ML

## 2017-07-12 PROCEDURE — 85610 PROTHROMBIN TIME: CPT | Performed by: PHYSICIAN ASSISTANT

## 2017-07-12 PROCEDURE — 25010000002 ALBUMIN HUMAN 5% PER 50 ML: Performed by: PHYSICIAN ASSISTANT

## 2017-07-12 PROCEDURE — 81001 URINALYSIS AUTO W/SCOPE: CPT | Performed by: PHYSICIAN ASSISTANT

## 2017-07-12 PROCEDURE — 94002 VENT MGMT INPAT INIT DAY: CPT

## 2017-07-12 PROCEDURE — 33519 CABG ARTERY-VEIN THREE: CPT | Performed by: THORACIC SURGERY (CARDIOTHORACIC VASCULAR SURGERY)

## 2017-07-12 PROCEDURE — 25010000002 AMIODARONE PER 30 MG

## 2017-07-12 PROCEDURE — 06BP4ZZ EXCISION OF RIGHT SAPHENOUS VEIN, PERCUTANEOUS ENDOSCOPIC APPROACH: ICD-10-PCS | Performed by: THORACIC SURGERY (CARDIOTHORACIC VASCULAR SURGERY)

## 2017-07-12 PROCEDURE — 99291 CRITICAL CARE FIRST HOUR: CPT | Performed by: INTERNAL MEDICINE

## 2017-07-12 PROCEDURE — 33533 CABG ARTERIAL SINGLE: CPT | Performed by: PHYSICIAN ASSISTANT

## 2017-07-12 PROCEDURE — 25010000002 PROTAMINE SULFATE PER 10 MG: Performed by: ANESTHESIOLOGY

## 2017-07-12 PROCEDURE — 021209W BYPASS CORONARY ARTERY, THREE ARTERIES FROM AORTA WITH AUTOLOGOUS VENOUS TISSUE, OPEN APPROACH: ICD-10-PCS | Performed by: THORACIC SURGERY (CARDIOTHORACIC VASCULAR SURGERY)

## 2017-07-12 PROCEDURE — 85018 HEMOGLOBIN: CPT | Performed by: THORACIC SURGERY (CARDIOTHORACIC VASCULAR SURGERY)

## 2017-07-12 PROCEDURE — 25010000002 CEFUROXIME PER 750 MG: Performed by: ANESTHESIOLOGY

## 2017-07-12 PROCEDURE — 82330 ASSAY OF CALCIUM: CPT

## 2017-07-12 PROCEDURE — 71010 HC CHEST PA OR AP: CPT

## 2017-07-12 PROCEDURE — 25010000002 PROPOFOL 10 MG/ML EMULSION: Performed by: ANESTHESIOLOGY

## 2017-07-12 PROCEDURE — 25010000002 INSULIN REGULAR HUMAN PER 5 UNITS: Performed by: PHYSICIAN ASSISTANT

## 2017-07-12 PROCEDURE — C1729 CATH, DRAINAGE: HCPCS | Performed by: THORACIC SURGERY (CARDIOTHORACIC VASCULAR SURGERY)

## 2017-07-12 PROCEDURE — 87086 URINE CULTURE/COLONY COUNT: CPT | Performed by: PHYSICIAN ASSISTANT

## 2017-07-12 PROCEDURE — 82947 ASSAY GLUCOSE BLOOD QUANT: CPT

## 2017-07-12 PROCEDURE — 25010000002 ONDANSETRON PER 1 MG: Performed by: PHYSICIAN ASSISTANT

## 2017-07-12 PROCEDURE — C1894 INTRO/SHEATH, NON-LASER: HCPCS | Performed by: THORACIC SURGERY (CARDIOTHORACIC VASCULAR SURGERY)

## 2017-07-12 PROCEDURE — 84132 ASSAY OF SERUM POTASSIUM: CPT | Performed by: THORACIC SURGERY (CARDIOTHORACIC VASCULAR SURGERY)

## 2017-07-12 PROCEDURE — 85014 HEMATOCRIT: CPT | Performed by: THORACIC SURGERY (CARDIOTHORACIC VASCULAR SURGERY)

## 2017-07-12 PROCEDURE — 85730 THROMBOPLASTIN TIME PARTIAL: CPT | Performed by: PHYSICIAN ASSISTANT

## 2017-07-12 PROCEDURE — 25010000002 KETOROLAC TROMETHAMINE PER 15 MG: Performed by: THORACIC SURGERY (CARDIOTHORACIC VASCULAR SURGERY)

## 2017-07-12 PROCEDURE — 63710000001 INSULIN REGULAR HUMAN PER 5 UNITS: Performed by: THORACIC SURGERY (CARDIOTHORACIC VASCULAR SURGERY)

## 2017-07-12 PROCEDURE — 25010000002 PROMETHAZINE PER 50 MG: Performed by: NURSE PRACTITIONER

## 2017-07-12 PROCEDURE — 85014 HEMATOCRIT: CPT

## 2017-07-12 PROCEDURE — 93010 ELECTROCARDIOGRAM REPORT: CPT | Performed by: INTERNAL MEDICINE

## 2017-07-12 PROCEDURE — 25810000003 DEXTROSE 5 % WITH KCL 20 MEQ 20-5 MEQ/L-% SOLUTION: Performed by: PHYSICIAN ASSISTANT

## 2017-07-12 PROCEDURE — 25010000002 PROTAMINE SULFATE PER 10 MG: Performed by: PHYSICIAN ASSISTANT

## 2017-07-12 PROCEDURE — 25010000002 MANNITOL PER 50 ML

## 2017-07-12 PROCEDURE — 93005 ELECTROCARDIOGRAM TRACING: CPT | Performed by: PHYSICIAN ASSISTANT

## 2017-07-12 PROCEDURE — 82962 GLUCOSE BLOOD TEST: CPT

## 2017-07-12 RX ORDER — DOBUTAMINE HYDROCHLORIDE 100 MG/100ML
2-20 INJECTION INTRAVENOUS CONTINUOUS PRN
Status: DISCONTINUED | OUTPATIENT
Start: 2017-07-12 | End: 2017-07-13

## 2017-07-12 RX ORDER — ALBUMIN, HUMAN INJ 5% 5 %
500 SOLUTION INTRAVENOUS ONCE
Status: COMPLETED | OUTPATIENT
Start: 2017-07-12 | End: 2017-07-12

## 2017-07-12 RX ORDER — SODIUM CHLORIDE, SODIUM LACTATE, POTASSIUM CHLORIDE, CALCIUM CHLORIDE 600; 310; 30; 20 MG/100ML; MG/100ML; MG/100ML; MG/100ML
9 INJECTION, SOLUTION INTRAVENOUS CONTINUOUS PRN
Status: DISCONTINUED | OUTPATIENT
Start: 2017-07-12 | End: 2017-07-12 | Stop reason: HOSPADM

## 2017-07-12 RX ORDER — NALOXONE HCL 0.4 MG/ML
0.4 VIAL (ML) INJECTION
Status: DISCONTINUED | OUTPATIENT
Start: 2017-07-12 | End: 2017-07-13

## 2017-07-12 RX ORDER — CHLORHEXIDINE GLUCONATE 0.12 MG/ML
15 RINSE ORAL EVERY 12 HOURS SCHEDULED
Status: DISCONTINUED | OUTPATIENT
Start: 2017-07-12 | End: 2017-07-13

## 2017-07-12 RX ORDER — SODIUM CHLORIDE 0.9 % (FLUSH) 0.9 %
30 SYRINGE (ML) INJECTION ONCE AS NEEDED
Status: DISCONTINUED | OUTPATIENT
Start: 2017-07-12 | End: 2017-07-13

## 2017-07-12 RX ORDER — ROCURONIUM BROMIDE 10 MG/ML
INJECTION, SOLUTION INTRAVENOUS AS NEEDED
Status: DISCONTINUED | OUTPATIENT
Start: 2017-07-12 | End: 2017-07-12 | Stop reason: SURG

## 2017-07-12 RX ORDER — POTASSIUM CHLORIDE 29.8 MG/ML
20 INJECTION INTRAVENOUS
Status: DISCONTINUED | OUTPATIENT
Start: 2017-07-12 | End: 2017-07-15

## 2017-07-12 RX ORDER — ONDANSETRON 2 MG/ML
4 INJECTION INTRAMUSCULAR; INTRAVENOUS EVERY 6 HOURS PRN
Status: DISCONTINUED | OUTPATIENT
Start: 2017-07-12 | End: 2017-07-17 | Stop reason: HOSPADM

## 2017-07-12 RX ORDER — POTASSIUM CHLORIDE 1.5 G/1.77G
40 POWDER, FOR SOLUTION ORAL AS NEEDED
Status: DISCONTINUED | OUTPATIENT
Start: 2017-07-12 | End: 2017-07-17 | Stop reason: HOSPADM

## 2017-07-12 RX ORDER — AMINOCAPROIC ACID 250 MG/ML
INJECTION, SOLUTION INTRAVENOUS AS NEEDED
Status: DISCONTINUED | OUTPATIENT
Start: 2017-07-12 | End: 2017-07-12 | Stop reason: SURG

## 2017-07-12 RX ORDER — ACETAMINOPHEN 325 MG/1
650 TABLET ORAL EVERY 4 HOURS PRN
Status: DISCONTINUED | OUTPATIENT
Start: 2017-07-12 | End: 2017-07-17 | Stop reason: HOSPADM

## 2017-07-12 RX ORDER — METOPROLOL TARTRATE 5 MG/5ML
2.5 INJECTION INTRAVENOUS EVERY 6 HOURS SCHEDULED
Status: DISCONTINUED | OUTPATIENT
Start: 2017-07-12 | End: 2017-07-13

## 2017-07-12 RX ORDER — BISACODYL 5 MG/1
10 TABLET, DELAYED RELEASE ORAL DAILY PRN
Status: DISCONTINUED | OUTPATIENT
Start: 2017-07-12 | End: 2017-07-17 | Stop reason: HOSPADM

## 2017-07-12 RX ORDER — HEPARIN SODIUM 1000 [USP'U]/ML
INJECTION, SOLUTION INTRAVENOUS; SUBCUTANEOUS AS NEEDED
Status: DISCONTINUED | OUTPATIENT
Start: 2017-07-12 | End: 2017-07-12 | Stop reason: SURG

## 2017-07-12 RX ORDER — MORPHINE SULFATE 2 MG/ML
2 INJECTION, SOLUTION INTRAMUSCULAR; INTRAVENOUS
Status: DISCONTINUED | OUTPATIENT
Start: 2017-07-12 | End: 2017-07-13

## 2017-07-12 RX ORDER — MAGNESIUM SULFATE HEPTAHYDRATE 40 MG/ML
2 INJECTION, SOLUTION INTRAVENOUS AS NEEDED
Status: DISCONTINUED | OUTPATIENT
Start: 2017-07-12 | End: 2017-07-17 | Stop reason: HOSPADM

## 2017-07-12 RX ORDER — ACETAMINOPHEN 325 MG/1
650 TABLET ORAL EVERY 4 HOURS PRN
Status: DISCONTINUED | OUTPATIENT
Start: 2017-07-12 | End: 2017-07-12 | Stop reason: HOSPADM

## 2017-07-12 RX ORDER — BISACODYL 10 MG
10 SUPPOSITORY, RECTAL RECTAL DAILY PRN
Status: DISCONTINUED | OUTPATIENT
Start: 2017-07-13 | End: 2017-07-17 | Stop reason: HOSPADM

## 2017-07-12 RX ORDER — PROTAMINE SULFATE 10 MG/ML
50 INJECTION, SOLUTION INTRAVENOUS ONCE
Status: COMPLETED | OUTPATIENT
Start: 2017-07-12 | End: 2017-07-12

## 2017-07-12 RX ORDER — PAPAVERINE HYDROCHLORIDE 30 MG/ML
INJECTION INTRAMUSCULAR; INTRAVENOUS AS NEEDED
Status: DISCONTINUED | OUTPATIENT
Start: 2017-07-12 | End: 2017-07-12 | Stop reason: HOSPADM

## 2017-07-12 RX ORDER — PROTAMINE SULFATE 10 MG/ML
INJECTION, SOLUTION INTRAVENOUS
Status: DISPENSED
Start: 2017-07-12 | End: 2017-07-12

## 2017-07-12 RX ORDER — ALBUMIN, HUMAN INJ 5% 5 %
SOLUTION INTRAVENOUS
Status: DISPENSED
Start: 2017-07-12 | End: 2017-07-12

## 2017-07-12 RX ORDER — ALBUMIN, HUMAN INJ 5% 5 %
SOLUTION INTRAVENOUS
Status: DISPENSED
Start: 2017-07-12 | End: 2017-07-13

## 2017-07-12 RX ORDER — SENNA AND DOCUSATE SODIUM 50; 8.6 MG/1; MG/1
2 TABLET, FILM COATED ORAL 2 TIMES DAILY
Status: DISCONTINUED | OUTPATIENT
Start: 2017-07-12 | End: 2017-07-17 | Stop reason: HOSPADM

## 2017-07-12 RX ORDER — NITROGLYCERIN 0.4 MG/1
0.4 TABLET SUBLINGUAL
Status: DISCONTINUED | OUTPATIENT
Start: 2017-07-12 | End: 2017-07-12 | Stop reason: HOSPADM

## 2017-07-12 RX ORDER — SODIUM CHLORIDE 9 MG/ML
INJECTION, SOLUTION INTRAVENOUS AS NEEDED
Status: DISCONTINUED | OUTPATIENT
Start: 2017-07-12 | End: 2017-07-12 | Stop reason: HOSPADM

## 2017-07-12 RX ORDER — DOCUSATE SODIUM 100 MG/1
100 CAPSULE, LIQUID FILLED ORAL 2 TIMES DAILY PRN
Status: DISCONTINUED | OUTPATIENT
Start: 2017-07-12 | End: 2017-07-17 | Stop reason: HOSPADM

## 2017-07-12 RX ORDER — NITROGLYCERIN 20 MG/100ML
5-200 INJECTION INTRAVENOUS CONTINUOUS PRN
Status: DISCONTINUED | OUTPATIENT
Start: 2017-07-12 | End: 2017-07-15

## 2017-07-12 RX ORDER — MIDAZOLAM HYDROCHLORIDE 1 MG/ML
INJECTION INTRAMUSCULAR; INTRAVENOUS AS NEEDED
Status: DISCONTINUED | OUTPATIENT
Start: 2017-07-12 | End: 2017-07-12 | Stop reason: SURG

## 2017-07-12 RX ORDER — VANCOMYCIN HYDROCHLORIDE 500 MG/10ML
INJECTION, POWDER, LYOPHILIZED, FOR SOLUTION INTRAVENOUS AS NEEDED
Status: DISCONTINUED | OUTPATIENT
Start: 2017-07-12 | End: 2017-07-12 | Stop reason: HOSPADM

## 2017-07-12 RX ORDER — HYDROCODONE BITARTRATE AND ACETAMINOPHEN 7.5; 325 MG/1; MG/1
1 TABLET ORAL EVERY 4 HOURS PRN
Status: DISCONTINUED | OUTPATIENT
Start: 2017-07-12 | End: 2017-07-17

## 2017-07-12 RX ORDER — PROMETHAZINE HYDROCHLORIDE 25 MG/ML
12.5 INJECTION, SOLUTION INTRAMUSCULAR; INTRAVENOUS ONCE
Status: COMPLETED | OUTPATIENT
Start: 2017-07-12 | End: 2017-07-12

## 2017-07-12 RX ORDER — LIDOCAINE HYDROCHLORIDE 10 MG/ML
0.5 INJECTION, SOLUTION EPIDURAL; INFILTRATION; INTRACAUDAL; PERINEURAL ONCE AS NEEDED
Status: COMPLETED | OUTPATIENT
Start: 2017-07-12 | End: 2017-07-12

## 2017-07-12 RX ORDER — SODIUM CHLORIDE 0.9 % (FLUSH) 0.9 %
1-10 SYRINGE (ML) INJECTION AS NEEDED
Status: DISCONTINUED | OUTPATIENT
Start: 2017-07-12 | End: 2017-07-12 | Stop reason: HOSPADM

## 2017-07-12 RX ORDER — DEXMEDETOMIDINE HYDROCHLORIDE 4 UG/ML
.2-1.5 INJECTION, SOLUTION INTRAVENOUS CONTINUOUS PRN
Status: DISCONTINUED | OUTPATIENT
Start: 2017-07-12 | End: 2017-07-14

## 2017-07-12 RX ORDER — FAMOTIDINE 10 MG/ML
20 INJECTION, SOLUTION INTRAVENOUS
Status: DISCONTINUED | OUTPATIENT
Start: 2017-07-12 | End: 2017-07-12 | Stop reason: HOSPADM

## 2017-07-12 RX ORDER — FAMOTIDINE 20 MG/1
20 TABLET, FILM COATED ORAL
Status: DISCONTINUED | OUTPATIENT
Start: 2017-07-12 | End: 2017-07-12 | Stop reason: HOSPADM

## 2017-07-12 RX ORDER — SUFENTANIL CITRATE 50 UG/ML
INJECTION EPIDURAL; INTRAVENOUS AS NEEDED
Status: DISCONTINUED | OUTPATIENT
Start: 2017-07-12 | End: 2017-07-12 | Stop reason: SURG

## 2017-07-12 RX ORDER — DOPAMINE HYDROCHLORIDE 160 MG/100ML
INJECTION, SOLUTION INTRAVENOUS CONTINUOUS PRN
Status: DISCONTINUED | OUTPATIENT
Start: 2017-07-12 | End: 2017-07-12 | Stop reason: SURG

## 2017-07-12 RX ORDER — FENTANYL CITRATE 50 UG/ML
25 INJECTION, SOLUTION INTRAMUSCULAR; INTRAVENOUS
Status: DISCONTINUED | OUTPATIENT
Start: 2017-07-12 | End: 2017-07-13

## 2017-07-12 RX ORDER — SODIUM CHLORIDE 9 MG/ML
30 INJECTION, SOLUTION INTRAVENOUS CONTINUOUS PRN
Status: DISCONTINUED | OUTPATIENT
Start: 2017-07-12 | End: 2017-07-13

## 2017-07-12 RX ORDER — ALBUMIN, HUMAN INJ 5% 5 %
500 SOLUTION INTRAVENOUS AS NEEDED
Status: COMPLETED | OUTPATIENT
Start: 2017-07-12 | End: 2017-07-12

## 2017-07-12 RX ORDER — MAGNESIUM SULFATE HEPTAHYDRATE 40 MG/ML
4 INJECTION, SOLUTION INTRAVENOUS AS NEEDED
Status: DISCONTINUED | OUTPATIENT
Start: 2017-07-12 | End: 2017-07-17 | Stop reason: HOSPADM

## 2017-07-12 RX ORDER — ASPIRIN 325 MG
325 TABLET ORAL ONCE
Status: COMPLETED | OUTPATIENT
Start: 2017-07-12 | End: 2017-07-12

## 2017-07-12 RX ORDER — DOPAMINE HYDROCHLORIDE 160 MG/100ML
2-20 INJECTION, SOLUTION INTRAVENOUS CONTINUOUS PRN
Status: DISCONTINUED | OUTPATIENT
Start: 2017-07-12 | End: 2017-07-13

## 2017-07-12 RX ORDER — FAMOTIDINE 10 MG/ML
20 INJECTION, SOLUTION INTRAVENOUS 2 TIMES DAILY
Status: DISCONTINUED | OUTPATIENT
Start: 2017-07-12 | End: 2017-07-13

## 2017-07-12 RX ORDER — POTASSIUM CHLORIDE, DEXTROSE MONOHYDRATE 150; 5 MG/100ML; G/100ML
30 INJECTION, SOLUTION INTRAVENOUS CONTINUOUS
Status: DISCONTINUED | OUTPATIENT
Start: 2017-07-12 | End: 2017-07-13

## 2017-07-12 RX ORDER — CHLORHEXIDINE GLUCONATE 0.12 MG/ML
15 RINSE ORAL ONCE
Status: COMPLETED | OUTPATIENT
Start: 2017-07-12 | End: 2017-07-12

## 2017-07-12 RX ORDER — FAMOTIDINE 20 MG/1
20 TABLET, FILM COATED ORAL 2 TIMES DAILY
Status: DISCONTINUED | OUTPATIENT
Start: 2017-07-12 | End: 2017-07-17 | Stop reason: HOSPADM

## 2017-07-12 RX ORDER — CHLORHEXIDINE GLUCONATE 500 MG/1
1 CLOTH TOPICAL EVERY 12 HOURS PRN
Status: DISCONTINUED | OUTPATIENT
Start: 2017-07-12 | End: 2017-07-12 | Stop reason: HOSPADM

## 2017-07-12 RX ORDER — PROTAMINE SULFATE 10 MG/ML
INJECTION, SOLUTION INTRAVENOUS AS NEEDED
Status: DISCONTINUED | OUTPATIENT
Start: 2017-07-12 | End: 2017-07-12 | Stop reason: SURG

## 2017-07-12 RX ORDER — MEPERIDINE HYDROCHLORIDE 25 MG/ML
25 INJECTION INTRAMUSCULAR; INTRAVENOUS; SUBCUTANEOUS EVERY 4 HOURS PRN
Status: ACTIVE | OUTPATIENT
Start: 2017-07-12 | End: 2017-07-12

## 2017-07-12 RX ORDER — VECURONIUM BROMIDE 1 MG/ML
INJECTION, POWDER, LYOPHILIZED, FOR SOLUTION INTRAVENOUS AS NEEDED
Status: DISCONTINUED | OUTPATIENT
Start: 2017-07-12 | End: 2017-07-12 | Stop reason: SURG

## 2017-07-12 RX ORDER — POTASSIUM CHLORIDE 750 MG/1
40 CAPSULE, EXTENDED RELEASE ORAL AS NEEDED
Status: DISCONTINUED | OUTPATIENT
Start: 2017-07-12 | End: 2017-07-17 | Stop reason: HOSPADM

## 2017-07-12 RX ORDER — PROPOFOL 10 MG/ML
VIAL (ML) INTRAVENOUS AS NEEDED
Status: DISCONTINUED | OUTPATIENT
Start: 2017-07-12 | End: 2017-07-12 | Stop reason: SURG

## 2017-07-12 RX ORDER — OXYCODONE HYDROCHLORIDE AND ACETAMINOPHEN 5; 325 MG/1; MG/1
2 TABLET ORAL EVERY 4 HOURS PRN
Status: DISCONTINUED | OUTPATIENT
Start: 2017-07-12 | End: 2017-07-17 | Stop reason: HOSPADM

## 2017-07-12 RX ORDER — PHENYLEPHRINE HCL IN 0.9% NACL 0.5 MG/5ML
.5-3 SYRINGE (ML) INTRAVENOUS CONTINUOUS PRN
Status: DISCONTINUED | OUTPATIENT
Start: 2017-07-12 | End: 2017-07-15

## 2017-07-12 RX ORDER — KETOROLAC TROMETHAMINE 30 MG/ML
30 INJECTION, SOLUTION INTRAMUSCULAR; INTRAVENOUS EVERY 6 HOURS PRN
Status: DISCONTINUED | OUTPATIENT
Start: 2017-07-12 | End: 2017-07-13

## 2017-07-12 RX ORDER — CALCIUM CHLORIDE 100 MG/ML
INJECTION INTRAVENOUS; INTRAVENTRICULAR AS NEEDED
Status: DISCONTINUED | OUTPATIENT
Start: 2017-07-12 | End: 2017-07-12 | Stop reason: SURG

## 2017-07-12 RX ORDER — ASPIRIN 325 MG
325 TABLET, DELAYED RELEASE (ENTERIC COATED) ORAL DAILY
Status: DISCONTINUED | OUTPATIENT
Start: 2017-07-13 | End: 2017-07-17 | Stop reason: HOSPADM

## 2017-07-12 RX ORDER — ATORVASTATIN CALCIUM 40 MG/1
40 TABLET, FILM COATED ORAL NIGHTLY
Status: DISCONTINUED | OUTPATIENT
Start: 2017-07-12 | End: 2017-07-17 | Stop reason: HOSPADM

## 2017-07-12 RX ORDER — PROPOFOL 10 MG/ML
VIAL (ML) INTRAVENOUS CONTINUOUS PRN
Status: DISCONTINUED | OUTPATIENT
Start: 2017-07-12 | End: 2017-07-12 | Stop reason: SURG

## 2017-07-12 RX ORDER — SODIUM CHLORIDE 9 MG/ML
INJECTION, SOLUTION INTRAVENOUS CONTINUOUS PRN
Status: DISCONTINUED | OUTPATIENT
Start: 2017-07-12 | End: 2017-07-12 | Stop reason: SURG

## 2017-07-12 RX ADMIN — CEFUROXIME 1.5 G: 1.5 INJECTION, POWDER, FOR SOLUTION INTRAVENOUS at 23:13

## 2017-07-12 RX ADMIN — FAMOTIDINE 20 MG: 10 INJECTION INTRAVENOUS at 12:28

## 2017-07-12 RX ADMIN — DOPAMINE HYDROCHLORIDE 10 MCG/KG/MIN: 160 INJECTION, SOLUTION INTRAVENOUS at 10:19

## 2017-07-12 RX ADMIN — PROTAMINE SULFATE 450 MG: 10 INJECTION, SOLUTION INTRAVENOUS at 10:29

## 2017-07-12 RX ADMIN — KETOROLAC TROMETHAMINE 30 MG: 30 INJECTION, SOLUTION INTRAMUSCULAR at 19:30

## 2017-07-12 RX ADMIN — METOPROLOL TARTRATE 2.5 MG: 5 INJECTION INTRAVENOUS at 23:13

## 2017-07-12 RX ADMIN — HEPARIN SODIUM 39000 UNITS: 1000 INJECTION, SOLUTION INTRAVENOUS; SUBCUTANEOUS at 08:42

## 2017-07-12 RX ADMIN — MIDAZOLAM HYDROCHLORIDE 2 MG: 1 INJECTION, SOLUTION INTRAMUSCULAR; INTRAVENOUS at 09:19

## 2017-07-12 RX ADMIN — AMINOCAPROIC ACID 10 G: 250 INJECTION, SOLUTION INTRAVENOUS at 10:36

## 2017-07-12 RX ADMIN — ALBUMIN HUMAN 500 ML: 0.05 INJECTION, SOLUTION INTRAVENOUS at 12:20

## 2017-07-12 RX ADMIN — SUFENTANIL CITRATE 100 MCG: 50 INJECTION EPIDURAL; INTRAVENOUS at 07:13

## 2017-07-12 RX ADMIN — FAMOTIDINE 20 MG: 20 TABLET ORAL at 06:18

## 2017-07-12 RX ADMIN — CEFUROXIME 1.5 G: 1.5 INJECTION, POWDER, FOR SOLUTION INTRAVENOUS at 15:03

## 2017-07-12 RX ADMIN — SODIUM CHLORIDE 2.5 MG/HR: 9 INJECTION, SOLUTION INTRAVENOUS at 15:01

## 2017-07-12 RX ADMIN — NITROGLYCERIN 30 MCG/MIN: 20 INJECTION INTRAVENOUS at 12:23

## 2017-07-12 RX ADMIN — PROTAMINE SULFATE 50 MG: 10 INJECTION, SOLUTION INTRAVENOUS at 11:10

## 2017-07-12 RX ADMIN — DEXMEDETOMIDINE HYDROCHLORIDE 1 MCG/KG/HR: 4 INJECTION, SOLUTION INTRAVENOUS at 15:01

## 2017-07-12 RX ADMIN — PROMETHAZINE HYDROCHLORIDE 12.5 MG: 25 INJECTION INTRAMUSCULAR; INTRAVENOUS at 22:41

## 2017-07-12 RX ADMIN — PROPOFOL 100 MG: 10 INJECTION, EMULSION INTRAVENOUS at 07:13

## 2017-07-12 RX ADMIN — AMINOCAPROIC ACID 10 G: 250 INJECTION, SOLUTION INTRAVENOUS at 08:30

## 2017-07-12 RX ADMIN — POTASSIUM CHLORIDE AND DEXTROSE MONOHYDRATE 30 ML/HR: 150; 5 INJECTION, SOLUTION INTRAVENOUS at 12:14

## 2017-07-12 RX ADMIN — MUPIROCIN 1 APPLICATION: 20 OINTMENT TOPICAL at 06:18

## 2017-07-12 RX ADMIN — PROPOFOL 50 MCG/KG/MIN: 10 INJECTION, EMULSION INTRAVENOUS at 11:15

## 2017-07-12 RX ADMIN — CALCIUM CHLORIDE 1 G: 100 INJECTION INTRAVENOUS; INTRAVENTRICULAR at 10:29

## 2017-07-12 RX ADMIN — PROPOFOL 50 MCG/KG/MIN: 10 INJECTION, EMULSION INTRAVENOUS at 12:24

## 2017-07-12 RX ADMIN — LIDOCAINE HYDROCHLORIDE 0.5 ML: 10 INJECTION, SOLUTION EPIDURAL; INFILTRATION; INTRACAUDAL; PERINEURAL at 06:18

## 2017-07-12 RX ADMIN — ALBUMIN HUMAN 500 ML: 0.05 INJECTION, SOLUTION INTRAVENOUS at 13:11

## 2017-07-12 RX ADMIN — VECURONIUM BROMIDE 12 MG: 1 INJECTION, POWDER, LYOPHILIZED, FOR SOLUTION INTRAVENOUS at 09:19

## 2017-07-12 RX ADMIN — SUFENTANIL CITRATE 100 MCG: 50 INJECTION EPIDURAL; INTRAVENOUS at 08:05

## 2017-07-12 RX ADMIN — SUFENTANIL CITRATE 50 MCG: 50 INJECTION EPIDURAL; INTRAVENOUS at 09:19

## 2017-07-12 RX ADMIN — MIDAZOLAM HYDROCHLORIDE 3 MG: 1 INJECTION, SOLUTION INTRAMUSCULAR; INTRAVENOUS at 07:13

## 2017-07-12 RX ADMIN — PROPOFOL 75 MCG/KG/MIN: 10 INJECTION, EMULSION INTRAVENOUS at 13:09

## 2017-07-12 RX ADMIN — ATORVASTATIN CALCIUM 40 MG: 40 TABLET, FILM COATED ORAL at 19:47

## 2017-07-12 RX ADMIN — ASPIRIN 325 MG ORAL TABLET 325 MG: 325 PILL ORAL at 12:28

## 2017-07-12 RX ADMIN — CEFUROXIME 1.5 G: 1.5 INJECTION, POWDER, FOR SOLUTION INTRAVENOUS at 07:35

## 2017-07-12 RX ADMIN — CHLORHEXIDINE GLUCONATE 15 ML: 1.2 RINSE ORAL at 12:28

## 2017-07-12 RX ADMIN — SODIUM CHLORIDE 10 UNITS/HR: 9 INJECTION, SOLUTION INTRAVENOUS at 07:36

## 2017-07-12 RX ADMIN — ONDANSETRON 4 MG: 2 INJECTION INTRAMUSCULAR; INTRAVENOUS at 20:18

## 2017-07-12 RX ADMIN — PROTAMINE SULFATE 50 MG: 10 INJECTION, SOLUTION INTRAVENOUS at 12:20

## 2017-07-12 RX ADMIN — MORPHINE SULFATE 2 MG: 2 INJECTION, SOLUTION INTRAMUSCULAR; INTRAVENOUS at 19:45

## 2017-07-12 RX ADMIN — SODIUM CHLORIDE, POTASSIUM CHLORIDE, SODIUM LACTATE AND CALCIUM CHLORIDE 9 ML/HR: 600; 310; 30; 20 INJECTION, SOLUTION INTRAVENOUS at 06:19

## 2017-07-12 RX ADMIN — FAMOTIDINE 20 MG: 10 INJECTION INTRAVENOUS at 17:36

## 2017-07-12 RX ADMIN — CEFUROXIME 1.5 G: 1.5 INJECTION, POWDER, FOR SOLUTION INTRAVENOUS at 10:30

## 2017-07-12 RX ADMIN — SODIUM CHLORIDE 3.6 UNITS/HR: 9 INJECTION, SOLUTION INTRAVENOUS at 15:00

## 2017-07-12 RX ADMIN — ALBUMIN HUMAN 500 ML: 0.05 INJECTION, SOLUTION INTRAVENOUS at 12:31

## 2017-07-12 RX ADMIN — ROCURONIUM BROMIDE 100 MG: 10 INJECTION INTRAVENOUS at 07:13

## 2017-07-12 RX ADMIN — CHLORHEXIDINE GLUCONATE 15 ML: 1.2 RINSE ORAL at 06:18

## 2017-07-12 RX ADMIN — SODIUM CHLORIDE: 9 INJECTION, SOLUTION INTRAVENOUS at 07:06

## 2017-07-12 NOTE — ANESTHESIA PROCEDURE NOTES
Airway  Urgency: elective    Airway not difficult    General Information and Staff    Patient location during procedure: OR  Anesthesiologist: JOSELUIS ZACARIAS    Indications and Patient Condition  Indications for airway management: airway protection    Preoxygenated: yes  MILS not maintained throughout  Mask difficulty assessment: 1 - vent by mask    Final Airway Details  Final airway type: endotracheal airway      Successful airway: ETT  Cuffed: yes   Successful intubation technique: direct laryngoscopy  Endotracheal tube insertion site: oral  Blade: Martinez  Blade size: #2  ETT size: 7.5 mm  Cormack-Lehane Classification: grade I - full view of glottis  Placement verified by: chest auscultation and capnometry   Measured from: lips  ETT to lips (cm): 24  Number of attempts at approach: 1    Additional Comments  Negative epigastric sounds, Breath sound equal bilaterally with symmetric chest rise and fall, 7.5 EVAC, EASY

## 2017-07-12 NOTE — PROGRESS NOTES
[facility]       LOS: 0 days   Patient Care Team:  Provider Not In System as PCP - General        Subjective       Patient Denies:  intubated      Vital Signs  Temp:  [97.8 °F (36.6 °C)] 97.8 °F (36.6 °C)  Heart Rate:  [63] 63  Resp:  [18] 18  BP: (137)/(83) 137/83    Intake/Output Summary (Last 24 hours) at 07/12/17 1207  Last data filed at 07/12/17 1136   Gross per 24 hour   Intake             1895 ml   Output              775 ml   Net             1120 ml     I/O this shift:  In: 1895 [I.V.:1700; Blood:195]  Out: 775 [Urine:775]    Physical Exam:     General Appearance:    Sedated on vent, in no acute distress       Neck:   No adenopathy, supple, trachea midline, no thyromegaly, no JVD       Lungs:     Clear to auscultation anteriorly,respirations regular, even and                  unlabored    Heart:    Regular rhythm and normal rate, normal S1 and S2, no            murmur, no gallop, + rub, no click   Chest Wall:    No abnormalities observed   Abdomen:     Normal bowel sounds, no masses, no organomegaly, soft               Extremities:   edema, no cyanosis, no redness   Pulses:   Pulses palpable and equal bilaterally     Results Review:     I reviewed the patient's new clinical results.      WBC WBC   Date/Time Value Ref Range Status   07/11/2017 1258 6.90 3.50 - 10.80 10*3/mm3 Final            HGB Hemoglobin   Date/Time Value Ref Range Status   07/12/2017 1038 9.2 (L) 12.0 - 17.0 g/dL Final   07/12/2017 1013 9.2 (L) 12.0 - 17.0 g/dL Final   07/12/2017 0951 8.8 (L) 12.0 - 17.0 g/dL Final   07/12/2017 0940 8.8 (L) 12.0 - 17.0 g/dL Final   07/12/2017 0931 8.5 (L) 12.0 - 17.0 g/dL Final   07/12/2017 0848 10.9 (L) 12.0 - 17.0 g/dL Final   07/12/2017 0723 11.9 (L) 12.0 - 17.0 g/dL Final   07/11/2017 1258 13.0 (L) 13.1 - 17.5 g/dL Final           HCT Hematocrit   Date/Time Value Ref Range Status   07/12/2017 1038 27 (L) 38 - 51 % Final   07/12/2017 1013  27 (L) 38 - 51 % Final   07/12/2017 0951 26 (L) 38 - 51 % Final   07/12/2017 0940 26 (L) 38 - 51 % Final   07/12/2017 0931 25 (L) 38 - 51 % Final   07/12/2017 0848 32 (L) 38 - 51 % Final   07/12/2017 0723 35 (L) 38 - 51 % Final   07/11/2017 1258 38.9 38.9 - 50.9 % Final            Platlets No results found for: LABPLAT  Sodium  Sodium   Date/Time Value Ref Range Status   07/11/2017 1258 135 132 - 146 mmol/L Final     Potassium  Potassium   Date/Time Value Ref Range Status   07/11/2017 1258 4.7 3.5 - 5.5 mmol/L Final     Chloride  Chloride   Date/Time Value Ref Range Status   07/11/2017 1258 101 99 - 109 mmol/L Final     BicarbonateNo results found for: PLASMABICARB    BUN BUN   Date/Time Value Ref Range Status   07/11/2017 1258 25 (H) 9 - 23 mg/dL Final      Creatinine Creatinine   Date/Time Value Ref Range Status   07/11/2017 1258 1.80 (H) 0.60 - 1.30 mg/dL Final      Calcium Calcium   Date/Time Value Ref Range Status   07/11/2017 1258 10.0 8.7 - 10.4 mg/dL Final      Mag Magnesium   Date/Time Value Ref Range Status   07/11/2017 1258 1.9 1.3 - 2.7 mg/dL Final           PT/INR:    Protime   Date Value Ref Range Status   07/11/2017 11.1 9.6 - 11.5 Seconds Final   /  INR   Date Value Ref Range Status   07/11/2017 1.02  Final     Troponin I   Lab Results   Component Value Date    TROPONINI 0.55 06/30/2014         albumin human      [START ON 7/13/2017] aspirin 325 mg Oral Daily   aspirin 325 mg Oral Once   atorvastatin 40 mg Oral Nightly   cefuroxime 1.5 g Intravenous Q8H   chlorhexidine 15 mL Mouth/Throat Q12H   famotidine 20 mg Intravenous BID   Or      famotidine 20 mg Oral BID   [START ON 7/13/2017] metoprolol tartrate 12.5 mg Oral Q12H   metoprolol tartrate 2.5 mg Intravenous Q6H   protamine      protamine 50 mg Intravenous Once   sennosides-docusate sodium 2 tablet Oral BID       dexmedetomidine 0.2-1.5 mcg/kg/hr    dextrose 5 % with KCl 20 mEq 30 mL/hr    DOBUTamine 2-20 mcg/kg/min    DOPamine 2-20 mcg/kg/min     EPINEPHrine 0.02-0.3 mcg/kg/min    insulin (HUMAN R) infusion 0.1 Units/kg/hr Last Rate: Stopped (07/12/17 0934)   insulin regular infusion 1 unit/mL (CCU use) 0-50 Units/hr    niCARdipine 5-15 mg/hr    nitroglycerin 5-200 mcg/min    norepinephrine 0.02-0.3 mcg/kg/min    phenylephrine 0.5-3 mcg/kg/min    propofol 5-50 mcg/kg/min    sodium chloride 30 mL/hr    vasopressin 0.02-0.1 Units/min        Assessment/Plan     Patient Active Problem List   Diagnosis Code   • Transient cerebral ischemia G45.9   • Diabetes mellitus, type 2 E11.9   • GERD (gastroesophageal reflux disease) K21.9   • Hypertension I10   • Lyme disease A69.20   • Hyperlipidemia E78.5   • Coronary artery disease I25.10   • CKD (chronic kidney disease), stage III N18.3   • Basilar artery stenosis I65.1   • CAD (coronary artery disease) I25.10     Hemodynamics acceptable early post 4 vessel CABG    JANAK Batista  07/12/17  12:07 PM

## 2017-07-12 NOTE — ANESTHESIA PROCEDURE NOTES
Arterial Line    Patient location during procedure: OR   Line placed for hemodynamic monitoring.  Performed By   Anesthesiologist: JOSELUIS ZACARIAS  Preanesthetic Checklist  Completed: patient identified, site marked, surgical consent, pre-op evaluation, timeout performed, IV checked, risks and benefits discussed and monitors and equipment checked  Arterial Line Prep   Sterile Tech: cap, gloves and sterile barriers  Prep: ChloraPrep  Patient monitoring: blood pressure monitoring, continuous pulse oximetry and EKG  Arterial Line Procedure   Laterality:right  Location:  radial artery  Catheter size: 20 G   Guidance: ultrasound guided and palpation technique  Number of attempts: 2  Successful placement: yes          Post Assessment   Dressing Type: line sutured, occlusive dressing applied, secured with tape and wrist guard applied.   Complications no  Circ/Move/Sens Assessment: normal and unchanged.   Patient Tolerance: patient tolerated the procedure well with no apparent complications

## 2017-07-12 NOTE — PROGRESS NOTES
Intensive Care Follow-up     Hospital:  LOS: 0 days   Mr. Jakob Boogie, 66 y.o. male is followed for:   CAD (coronary artery disease)        Subjective   Interval History:  This is a 66-year-old never smoker with a history of uncontrolled diabetes mellitus, prior TIA by history, and coronary artery disease who underwent coronary artery bypass grafting ×4 today per Dr. Mcmillan. The patient is brought to the intensive care unit for postoperative care. I have reviewed the patient's preoperative pulmonary function tests which show normal spirometry with no sign of obstruction. Postoperative chest film is reviewed which shows well-placed endotracheal tube and mediastinal tubes with no acute infiltrate.    The patient's relevant past medical, surgical and social history were reviewed and updated in Epic as appropriate.        Objective     Infusions:    dexmedetomidine 0.2-1.5 mcg/kg/hr    dextrose 5 % with KCl 20 mEq 30 mL/hr Last Rate: 30 mL/hr (07/12/17 1214)   DOBUTamine 2-20 mcg/kg/min    DOPamine 2-20 mcg/kg/min    EPINEPHrine 0.02-0.3 mcg/kg/min    insulin (HUMAN R) infusion 0.1 Units/kg/hr Last Rate: Stopped (07/12/17 0934)   insulin regular infusion 1 unit/mL (CCU use) 0-50 Units/hr    niCARdipine 5-15 mg/hr    nitroglycerin 5-200 mcg/min Last Rate: 30 mcg/min (07/12/17 1223)   norepinephrine 0.02-0.3 mcg/kg/min    phenylephrine 0.5-3 mcg/kg/min    propofol 5-50 mcg/kg/min Last Rate: 50 mcg/kg/min (07/12/17 1224)   sodium chloride 30 mL/hr    vasopressin 0.02-0.1 Units/min      Medications:    albumin human      [START ON 7/13/2017] aspirin 325 mg Oral Daily   atorvastatin 40 mg Oral Nightly   cefuroxime 1.5 g Intravenous Q8H   chlorhexidine 15 mL Mouth/Throat Q12H   famotidine 20 mg Intravenous BID   Or      famotidine 20 mg Oral BID   [START ON 7/13/2017] metoprolol tartrate 12.5 mg Oral Q12H   metoprolol tartrate 2.5 mg Intravenous Q6H   protamine      sennosides-docusate sodium 2 tablet Oral BID  "      Vital Sign Min/Max for last 24 hours  Temp  Min: 97.8 °F (36.6 °C)  Max: 97.8 °F (36.6 °C)   BP  Min: 99/62  Max: 137/83   Pulse  Min: 63  Max: 73   Resp  Min: 18  Max: 18   SpO2  Min: 97 %  Max: 100 %   No Data Recorded       Input/Output for last 24 hour shift      Vent Mode: SIMV/VC  S RR:  [12] 12  PEEP/CPAP (cm H2O):  [5 cm H20] 5 cm H20  AR SUP:  [10 cm H20] 10 cm H20  MAP (cm H2O):  [10] 10  Objective:  General Appearance:  Comfortable, well-appearing and in no acute distress (Currently sedated).    Vital signs: (most recent): Blood pressure 124/77, pulse 73, temperature 97.8 °F (36.6 °C), temperature source Tympanic, resp. rate 18, height 68\" (172.7 cm), weight 248 lb (112 kg), SpO2 100 %.  No fever.    Output: Producing urine.    HEENT: (Endotracheal tube is in place. A right introducer catheter is in place with pulmonary artery catheter.)    Lungs:  Normal respiratory rate and normal effort.  He is not in respiratory distress.  Breath sounds clear to auscultation.  No wheezes, rales, rhonchi or decreased breath sounds.    Heart: Normal rate.  Regular rhythm.  S1 normal and S2 normal.  Positive for friction rub.  No murmur or gallop.   Chest: Symmetric chest wall expansion.   Abdomen: Abdomen is soft and non-distended.  Bowel sounds are normal.     Extremities: There is deformity.  There is no effusion, local swelling or dependent edema.  (The patient is missing index finger on his left hand.)  Neurological: (Sedated.).    Pupils:  Pupils are equal, round, and reactive to light.  Pupils are equal.   Skin:  Warm.  No rash or cyanosis.               Results from last 7 days  Lab Units 07/12/17  1200 07/12/17  1038 07/12/17  1013  07/11/17  1258   WBC 10*3/mm3 7.60  --   --   --  6.90   HEMOGLOBIN g/dL 11.0*  --   --   --  13.0*   HEMOGLOBIN, POC g/dL  --  9.2* 9.2*  < >  --    PLATELETS 10*3/mm3 131*  --   --   --  173   < > = values in this interval not displayed.    Results from last 7 days  Lab Units " 07/11/17  1258   SODIUM mmol/L 135   POTASSIUM mmol/L 4.7   CO2 mmol/L 34.0*   BUN mg/dL 25*   CREATININE mg/dL 1.80*   MAGNESIUM mg/dL 1.9   GLUCOSE mg/dL 251*     Estimated Creatinine Clearance: 49.7 mL/min (by C-G formula based on Cr of 1.8).      Results from last 7 days  Lab Units 07/12/17  1218   PH, ARTERIAL pH units 7.364   PCO2, ARTERIAL mm Hg 39.9   PO2 ART mm Hg 190.0*       Images:   See description in HPI.    I reviewed the patient's results and images.     Assessment/Plan   Impression      Principal Problem:    CAD (coronary artery disease), status post CABG ×4 on 07/12/2017.  Active Problems:    Transient cerebral ischemia    Diabetes mellitus, type 2, uncontrolled with A1c of 9.7    Hypertension    Hyperlipidemia    CKD (chronic kidney disease), stage III       Plan        We will wean the ventilator per protocol.  Perioperative glucose control with insulin drip and we will adjust to subcutaneous insulin when able.  We will continue to follow the creatinine.  The patient is critical currently stable.    Plan of care and goals reviewed with mulitdisciplinary team at daily rounds.   I discussed the patient's findings and my recommendations with nursing staff and primary care team     Time spent Critical care 36 min (It does not include procedure time).    Richard Lama MD, [facility]  Pulmonary and Critical Care Medicine  07/12/17 12:33 PM

## 2017-07-12 NOTE — ANESTHESIA PREPROCEDURE EVALUATION
Anesthesia Evaluation     Patient summary reviewed and Nursing notes reviewed   NPO Solid Status: > 8 hours  NPO Liquid Status: > 8 hours     Airway   Mallampati: I  TM distance: >3 FB  Neck ROM: full  no difficulty expected  Dental - normal exam     Pulmonary - negative pulmonary ROS and normal exam   Cardiovascular - normal exam    (+) hypertension, CAD, dysrhythmias Atrial Fib, PVD, hyperlipidemia      Neuro/Psych  (+) TIA,    GI/Hepatic/Renal/Endo    (+)  GERD, diabetes mellitus type 2,     Musculoskeletal     Abdominal  - normal exam    Bowel sounds: normal.   Substance History - negative use     OB/GYN negative ob/gyn ROS         Other   (+) arthritis                                     Anesthesia Plan    ASA 4     general   (JANAK MCFADDEN)  intravenous induction   Anesthetic plan and risks discussed with patient.

## 2017-07-12 NOTE — PLAN OF CARE
Problem: Patient Care Overview (Adult)  Goal: Plan of Care Review  Outcome: Ongoing (interventions implemented as appropriate)  Goal: Adult Individualization and Mutuality  Outcome: Ongoing (interventions implemented as appropriate)    07/12/17 1325   Individualization   Patient Specific Preferences unknown - sedated         Problem: Cardiac Surgery (Adult)  Goal: Signs and Symptoms of Listed Potential Problems Will be Absent or Manageable (Cardiac Surgery)  Outcome: Ongoing (interventions implemented as appropriate)    07/12/17 1325   Cardiac Surgery   Problems Assessed (Cardiac Surgery) all   Problems Present (Cardiac Surgery) fluid imbalance;hemodynamic instability         Problem: Mechanical Ventilation, Invasive (Adult)  Goal: Signs and Symptoms of Listed Potential Problems Will be Absent or Manageable (Mechanical Ventilation, Invasive)  Outcome: Ongoing (interventions implemented as appropriate)    07/12/17 1325   Mechanical Ventilation, Invasive   Problems Assessed (Mechanical Ventilation, Invasive) all   Problems Present (Mechanical Ventilation, Invasive) none         Problem: Fall Risk (Adult)  Goal: Identify Related Risk Factors and Signs and Symptoms  Outcome: Ongoing (interventions implemented as appropriate)    07/12/17 1325   Fall Risk   Fall Risk: Related Risk Factors gait/mobility problems;neuro disease/injury;polypharmacy   Fall Risk: Signs and Symptoms presence of risk factors       Goal: Absence of Falls  Outcome: Ongoing (interventions implemented as appropriate)    07/12/17 1325   Fall Risk (Adult)   Absence of Falls making progress toward outcome         Problem: Pressure Ulcer Risk (Juan C Scale) (Adult,Obstetrics,Pediatric)  Goal: Identify Related Risk Factors and Signs and Symptoms  Outcome: Ongoing (interventions implemented as appropriate)    07/12/17 1325   Pressure Ulcer Risk (Juan C Scale)   Related Risk Factors (Pressure Ulcer Risk (Juan C Scale)) critical care admission;body weight  extremes       Goal: Skin Integrity  Outcome: Ongoing (interventions implemented as appropriate)    07/12/17 1325   Pressure Ulcer Risk (Juan C Scale) (Adult,Obstetrics,Pediatric)   Skin Integrity making progress toward outcome

## 2017-07-12 NOTE — H&P (VIEW-ONLY)
07/03/2017  Patient Information  Jakob Boogie                                                                                          [address] [facility] [address]  [facility] [address]   1950  'PCP/Referring Physician'  Provider Not In System  None  No ref. provider found    Chief Complaint   Patient presents with   • Consult     NP per Dr. Mosqueda for CAD        History of Present Illness:  Patient was referred to me to evaluate for coronary bypass grafting surgery.  Patient has a history of previous coronary artery stenting.  Recently he states with minimal activity he has substernal chest pain that he describes as tight and heavy.  He said it radiates into both arms and is associated with shortness of breath.  He states that on one occasion he had associated nausea but that is only been on 1 occasion.  When the pain occurs and he will rest he says it takes at least 10 minutes for the pain to totally resolved.  He has had a recent cardiac catheterization demonstrating significant coronary artery disease and I was asked see and evaluate for surgery. It should also be noted that he had had a transient ischemic attack in the past with slurred speech which resolved.  The carotids were evaluated and echocardiogram was performed and no cause of this transient ischemic attack was never elucidated.  He also has had no documented atrial fibrillation.      Patient Active Problem List   Diagnosis   • Transient cerebral ischemia   • Diabetes mellitus, type 2   • GERD (gastroesophageal reflux disease)   • Hypertension   • Lyme disease   • Hyperlipidemia   • Coronary artery disease   • CKD (chronic kidney disease), stage III   • Basilar artery stenosis     Past Medical History:   Diagnosis Date   • Abdominal hernia    • Arthritis    • CKD (chronic kidney disease), stage III    • Coronary artery disease    • Diabetes mellitus, type 2    • GERD (gastroesophageal reflux disease)    • Hyperlipidemia    • Hypertension    • TIA (transient  "ischemic attack) 05/13/2017     Past Surgical History:   Procedure Laterality Date   • AMPUTATION      left index   • CHOLECYSTECTOMY     • CORONARY STENT PLACEMENT         Current Outpatient Prescriptions:   •  amLODIPine (NORVASC) 5 MG tablet, Take 1 tablet by mouth Daily., Disp: 30 tablet, Rfl: 0  •  apixaban (ELIQUIS) 5 MG tablet tablet, Take 1 tablet by mouth Every 12 (Twelve) Hours., Disp: 60 tablet, Rfl: 0  •  aspirin 81 MG EC tablet, Take 81 mg by mouth Daily., Disp: , Rfl:   •  fenofibrate (TRICOR) 48 MG tablet, Take 48 mg by mouth Every Night., Disp: , Rfl:   •  hydrochlorothiazide (HYDRODIURIL) 25 MG tablet, Take 25 mg by mouth Daily., Disp: , Rfl:   •  insulin glargine (LANTUS) 100 UNIT/ML injection, Inject  under the skin Daily., Disp: , Rfl:   •  losartan (COZAAR) 50 MG tablet, Take 100 mg by mouth Daily., Disp: , Rfl:   •  metoprolol tartrate (LOPRESSOR) 25 MG tablet, Take 1 tablet by mouth Every 12 (Twelve) Hours., Disp: 60 tablet, Rfl: 0  •  tamsulosin (FLOMAX) 0.4 MG capsule 24 hr capsule, Take 1 capsule by mouth Every Night., Disp: , Rfl:   •  traZODone (DESYREL) 75 MG half tablet, Take 150 mg by mouth Every Night., Disp: , Rfl:   •  glipiZIDE (GLUCOTROL) 10 MG tablet, Take 10 mg by mouth 2 (Two) Times a Day Before Meals., Disp: , Rfl:   Allergies   Allergen Reactions   • Bactrim [Sulfamethoxazole-Trimethoprim] Hives     On patients arms, per wife \"look like mosquito bites\"     Social History     Social History   • Marital status:      Spouse name: N/A   • Number of children: 1   • Years of education: N/A     Occupational History   • self employed-[employer]      Social History Main Topics   • Smoking status: Never Smoker   • Smokeless tobacco: Never Used   • Alcohol use No   • Drug use: No   • Sexual activity: Defer     Other Topics Concern   • Not on file     Social History Narrative    Lives with his wife in [place], [place].      Family History   Problem Relation Age of Onset   • Stroke Mother  " "  • Heart disease Mother    • Diabetes Mother    • Heart disease Father    • Alzheimer's disease Father      Review of Systems   Constitution: Negative for chills, fever, malaise/fatigue, night sweats and weight loss.   HENT: Positive for hearing loss. Negative for headaches, odynophagia and sore throat.    Cardiovascular: Positive for chest pain and dyspnea on exertion. Negative for leg swelling, orthopnea and palpitations.   Respiratory: Negative for cough and hemoptysis.    Endocrine: Negative for cold intolerance, heat intolerance, polydipsia, polyphagia and polyuria.   Hematologic/Lymphatic: Bruises/bleeds easily.   Skin: Negative for itching and rash.   Musculoskeletal: Positive for back pain (bone spurs). Negative for joint pain, joint swelling and myalgias.   Gastrointestinal: Negative for abdominal pain, constipation, diarrhea, hematemesis, hematochezia, melena, nausea and vomiting.   Genitourinary: Negative for dysuria, frequency and hematuria.   Neurological: Positive for numbness (right knee to foot). Negative for focal weakness and seizures.   Psychiatric/Behavioral: Negative for suicidal ideas.   All other systems reviewed and are negative.    Vitals:    07/03/17 0748   BP: 130/80   BP Location: Left arm   Patient Position: Sitting   Pulse: 52   Temp: 96.9 °F (36.1 °C)   Weight: 247 lb (112 kg)   Height: 68\" (172.7 cm)      Physical Exam   CONSTITUTIONAL: Alert and conversant, Well dressed, Well nourished, No acute distress  EYES: Sclera clean, Anicteric, Pupils equal  ENT: No nasal deviation, Trachea midline  NECK: No neck masses, Supple  LUNGS: No wheezing, Cough, non-congested  HEART: No rubs, No murmurs  GI:  Soft, non-distended, No masses, Non tender  to palpation there is a large ventral hernia.  Bowel sounds normal.  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    Labs/Imaging:   I reviewed the heart catheterization images and report.  It demonstrates significant coronary artery disease.  His wife here also confirms the story of his angina.    Assessment/Plan:  DIABETES: I will manage the patient's blood sugars closely, both intraoperatively and postoperatively.  This patient may require a continuous insulin infusion to maintain strict serum glucose control.  I will coordinate the patient's glucose management with the hospital endocrinologist or hospitalist service if the management becomes problematic. The patient is aware that diabetes can complicate their postoperative course and contribute to infection or wound- healing issues.    HYPERTENSION: The patient has known hypertension. I will manage the blood pressure closely intraoperatively and postoperatively to maintain end organ perfusion, but also to mitigate against bleeding caused by extreme hypertension.  Will evaluate for beta blockade prior to anesthesia. Hypertension postoperatively will complicate bleeding problems.    HYPERLIPIDEMIA: The patient's statin medication will be continued up to and including the day of surgery. Dietary changes have been discussed.    Patient has underlying renal insufficiency which further complicates his heart surgery.  He also has had a previous stroke and the cause was never elucidated.  I have explained to the patient and his wife that coronary surgery is a complex operation that has with it a risk of stroke, bleeding, infection and death and they agreed to proceed.  I have indicated that his risk of stroke might be higher as he has had a previous stroke.  They agreed to proceed.  He will stop Eliquis a few days prior to surgery.         Patient Active Problem List    Diagnosis   • Transient cerebral ischemia   • Diabetes mellitus, type 2   • GERD (gastroesophageal reflux disease)   • Hypertension   • Lyme disease   • Hyperlipidemia   • Coronary artery disease   • CKD (chronic kidney disease), stage III   • Basilar artery stenosis     Signed by: Joey Mcmillan M.D.    7/3/2017    CC:  MD Teresa Means, , editing for Joey Mcmillan M.D.    I, Joey Mcmillan MD, have read and agree with the editing done by Stephanie Monteiro, .

## 2017-07-12 NOTE — INTERVAL H&P NOTE
"Pre-Op H&P (See Recent Office Note Attached)    Chief complaint: MVCAD    HPI:      Patient is a 66 y.o. male who presents with MVCAD and here today for CABG with ORLIN/EVH for SVG    Review of Systems:  General ROS:  no fever, chills, rashes, No change since last office visit  Cardiovascular ROS: no chest pain since last visit or dyspnea on exertion  Respiratory ROS: no cough, shortness of breath, or wheezing    Immunization History:   Influenza:  no  Pneumococcal:  no  Tetanus:  no    Vital Signs:  /83 (BP Location: Right arm, Patient Position: Lying)  Pulse 63  Temp 97.8 °F (36.6 °C) (Tympanic)   Resp 18  Ht 68\" (172.7 cm)  Wt 248 lb (112 kg)  SpO2 97%  BMI 37.71 kg/m2    Physical Exam:    CV:  S1S2 regular rate and rhythm, no murmur               Resp:  Clear to auscultation; respirations regular, even and unlabored    Results Review:    Results reviewed    Plan for coronary surgery today.  Patient is again aware of the risk of stroke bleeding infection death and renal failure.  He has had a previous transient ischemic attack he has renal insufficiency and chronic obstructive pulmonary disease.  He agrees to proceed    Mamta Perez, HIPOLITO  7/12/2017 6:33 AM      "

## 2017-07-12 NOTE — ANESTHESIA POSTPROCEDURE EVALUATION
Patient: Jakob Boogie    Procedure Summary     Date Anesthesia Start Anesthesia Stop Room / Location    07/12/17 0706 1136 [facility] 16 / [facility] OR       Procedure Diagnosis Surgeon Provider    median sternotomy,CORONARY ARTERY BYPASS WITH INTERNAL MAMMARY ARTERY GRAFT x   4 with ENDOSCOPIC VEIN HARVESTING OF THE RIGHT GREATER SAPHENOUS VEIN (N/A Chest) Coronary artery disease involving native heart with angina pectoris, unspecified vessel or lesion type  (Coronary artery disease involving native heart with angina pectoris, unspecified vessel or lesion type [I25.119]) MD Chandrakant Emanuel MD          Anesthesia Type: general  Last vitals  BP      Temp      Pulse     Resp      SpO2        Post Anesthesia Care and Evaluation    Patient location during evaluation: ICU  Patient participation: complete - patient cannot participate  Level of consciousness: obtunded/minimal responses  Pain score: 0  Pain management: adequate  Airway patency: patent  Anesthetic complications: No anesthetic complications  PONV Status: none  Cardiovascular status: hemodynamically stable and acceptable  Respiratory status: acceptable, ETT, intubated and ventilator  Hydration status: acceptable

## 2017-07-12 NOTE — ANESTHESIA PROCEDURE NOTES
Central Line    Patient location during procedure: OR  Indications: vascular access  Staff  Anesthesiologist: JOSELUIS ZACARIAS  Preanesthetic Checklist  Completed: patient identified, site marked, surgical consent, pre-op evaluation, timeout performed, IV checked, risks and benefits discussed and monitors and equipment checked  Central Line Prep  Sterile Tech:cap, gloves, gown, mask and sterile barriers  Prep: chloraprep  Patient monitoring: blood pressure monitoring, continuous pulse oximetry and EKG  Central Line Procedure  Laterality:right  Location:internal jugular  Catheter Type:Cordis and [place]  Catheter Size:9 Fr  Guidance:ultrasound guided  PROCEDURE NOTE/ULTRASOUND INTERPRETATION.  Using ultrasound guidance the potential vascular sites for insertion of the catheter were visualized to determine the patency of the vessel to be used for vascular access.  After selecting the appropriate site for insertion, the needle was visualized under ultrasound being inserted into the internal jugular vein, followed by ultrasound confirmation of wire and catheter placement. There were no abnormalities seen on ultrasound; an image was taken; and the patient tolerated the procedure with no complications.   Assessment  Post procedure:biopatch applied, line sutured, occlusive dressing applied and secured with tape  Assessement:blood return through all ports, free fluid flow and chest x-ray ordered  Complications:no  Patient Tolerance:patient tolerated the procedure well with no apparent complications

## 2017-07-12 NOTE — OP NOTE
Operative Report    Preop Diagnosis: Coronary artery disease.  Morbid obesity.  Hypertension.  Hyperlipidemia.  Previous stroke.  Previous peripheral arterial intervention.  Renal insufficiency with elevated creatinine.        Procedure: Coronary bypass grafting ×4 with left internal mammary artery to left anterior descending.  Saphenous vein graft to an obtuse marginal.  An end saphenous vein graft sequence between diagonal #1 and diagonal #2.  Endoscopic harvesting of the right great saphenous vein.        Surgeons: Joey Mcmillan M.D. and Rashawn Koehler PAC        Indication: Patient referred to me with the above listed medical issues.  He understood the surgery and risk of stroke bleeding infection death and renal failure he is remain as to outcome and he agreed to proceed        Description: Supine position.  Sterile prep and drape right great saphenous vein harvested endoscopically median sternotomy performed left internal mammary harvested and the patient heparinized.  Cannulas were placed in the ascending aorta and right atrial appendage and the patient begun on cardiopulmonary bypass.  Aorta crossclamped and antegrade cardioplegia given.  First saphenous vein graft was sutured to a 2.25 mm obtuse marginal branch the circumflex.  The right coronary artery system was chronically occluded extremely small and nongraftable.  Second saphenous vein graft sequenced to side to side to a high first diagonal branch although this technically could have been a ramus intermedius branch.  This vein was then sequenced distally an end-to-side fashion to the second diagonal branch.  Additional cardioplegia was given and the left internal mammary artery was sutured to a 2-1/2-2.75 mm left anterior descending coronary.  The 2 proximal vein grafts were sutured to the ascending aorta and marked with radio opaque washer on the most superior of these grafts.  The patient was weaned from bypass without difficulty initially with  ventricular pacing and subsequent his own sinus rhythm.  Sternum was closed with wire.  The fascial suture and the skin with suture and the sponge and needle count reported as correct.  Total cross-clamp time 56 minutes.  Total cardiopulmonary bypass time 63 minutes.      Please note that portions of this note were completed with a voice recognition program. Efforts were made to edit the dictations, but occasionally words are mistranscribed.

## 2017-07-13 ENCOUNTER — APPOINTMENT (OUTPATIENT)
Dept: GENERAL RADIOLOGY | Facility: HOSPITAL | Age: 67
End: 2017-07-13

## 2017-07-13 LAB
ABO + RH BLD: NORMAL
ALBUMIN SERPL-MCNC: 4.1 G/DL (ref 3.2–4.8)
ANION GAP SERPL CALCULATED.3IONS-SCNC: 8 MMOL/L (ref 3–11)
BASOPHILS # BLD AUTO: 0.02 10*3/MM3 (ref 0–0.2)
BASOPHILS NFR BLD AUTO: 0.2 % (ref 0–1)
BH BB BLOOD EXPIRATION DATE: NORMAL
BH BB BLOOD TYPE BARCODE: 6200
BH BB DISPENSE STATUS: NORMAL
BH BB PRODUCT CODE: NORMAL
BH BB UNIT NUMBER: NORMAL
BUN BLD-MCNC: 22 MG/DL (ref 9–23)
BUN/CREAT SERPL: 12.2 (ref 7–25)
CALCIUM SPEC-SCNC: 9.7 MG/DL (ref 8.7–10.4)
CHLORIDE SERPL-SCNC: 105 MMOL/L (ref 99–109)
CO2 SERPL-SCNC: 24 MMOL/L (ref 20–31)
CREAT BLD-MCNC: 1.8 MG/DL (ref 0.6–1.3)
DEPRECATED RDW RBC AUTO: 42.1 FL (ref 37–54)
EOSINOPHIL # BLD AUTO: 0.01 10*3/MM3 (ref 0–0.3)
EOSINOPHIL NFR BLD AUTO: 0.1 % (ref 0–3)
ERYTHROCYTE [DISTWIDTH] IN BLOOD BY AUTOMATED COUNT: 12.6 % (ref 11.3–14.5)
GFR SERPL CREATININE-BSD FRML MDRD: 38 ML/MIN/1.73
GLUCOSE BLD-MCNC: 140 MG/DL (ref 70–100)
GLUCOSE BLDC GLUCOMTR-MCNC: 108 MG/DL (ref 70–130)
GLUCOSE BLDC GLUCOMTR-MCNC: 137 MG/DL (ref 70–130)
GLUCOSE BLDC GLUCOMTR-MCNC: 141 MG/DL (ref 70–130)
GLUCOSE BLDC GLUCOMTR-MCNC: 142 MG/DL (ref 70–130)
GLUCOSE BLDC GLUCOMTR-MCNC: 143 MG/DL (ref 70–130)
GLUCOSE BLDC GLUCOMTR-MCNC: 145 MG/DL (ref 70–130)
GLUCOSE BLDC GLUCOMTR-MCNC: 145 MG/DL (ref 70–130)
GLUCOSE BLDC GLUCOMTR-MCNC: 147 MG/DL (ref 70–130)
GLUCOSE BLDC GLUCOMTR-MCNC: 150 MG/DL (ref 70–130)
GLUCOSE BLDC GLUCOMTR-MCNC: 160 MG/DL (ref 70–130)
GLUCOSE BLDC GLUCOMTR-MCNC: 172 MG/DL (ref 70–130)
GLUCOSE BLDC GLUCOMTR-MCNC: 195 MG/DL (ref 70–130)
GLUCOSE BLDC GLUCOMTR-MCNC: 210 MG/DL (ref 70–130)
GLUCOSE BLDC GLUCOMTR-MCNC: 218 MG/DL (ref 70–130)
GLUCOSE BLDC GLUCOMTR-MCNC: 254 MG/DL (ref 70–130)
GLUCOSE BLDC GLUCOMTR-MCNC: 257 MG/DL (ref 70–130)
GLUCOSE BLDC GLUCOMTR-MCNC: 275 MG/DL (ref 70–130)
GLUCOSE BLDC GLUCOMTR-MCNC: 277 MG/DL (ref 70–130)
GLUCOSE BLDC GLUCOMTR-MCNC: 280 MG/DL (ref 70–130)
GLUCOSE BLDC GLUCOMTR-MCNC: 308 MG/DL (ref 70–130)
GLUCOSE BLDC GLUCOMTR-MCNC: 86 MG/DL (ref 70–130)
GLUCOSE BLDC GLUCOMTR-MCNC: 92 MG/DL (ref 70–130)
HCT VFR BLD AUTO: 30.5 % (ref 38.9–50.9)
HCT VFR BLD AUTO: 31.1 % (ref 38.9–50.9)
HGB BLD-MCNC: 10.4 G/DL (ref 13.1–17.5)
HGB BLD-MCNC: 10.4 G/DL (ref 13.1–17.5)
IMM GRANULOCYTES # BLD: 0.05 10*3/MM3 (ref 0–0.03)
IMM GRANULOCYTES NFR BLD: 0.4 % (ref 0–0.6)
INR PPP: 1.05
LYMPHOCYTES # BLD AUTO: 0.69 10*3/MM3 (ref 0.6–4.8)
LYMPHOCYTES NFR BLD AUTO: 5.4 % (ref 24–44)
MAGNESIUM SERPL-MCNC: 2.4 MG/DL (ref 1.3–2.7)
MCH RBC QN AUTO: 30.4 PG (ref 27–31)
MCHC RBC AUTO-ENTMCNC: 33.4 G/DL (ref 32–36)
MCV RBC AUTO: 90.9 FL (ref 80–99)
MONOCYTES # BLD AUTO: 0.74 10*3/MM3 (ref 0–1)
MONOCYTES NFR BLD AUTO: 5.8 % (ref 0–12)
NEUTROPHILS # BLD AUTO: 11.3 10*3/MM3 (ref 1.5–8.3)
NEUTROPHILS NFR BLD AUTO: 88.1 % (ref 41–71)
PHOSPHATE SERPL-MCNC: 4.1 MG/DL (ref 2.4–5.1)
PLATELET # BLD AUTO: 145 10*3/MM3 (ref 150–450)
PMV BLD AUTO: 11.1 FL (ref 6–12)
POTASSIUM BLD-SCNC: 4 MMOL/L (ref 3.5–5.5)
POTASSIUM BLD-SCNC: 4.1 MMOL/L (ref 3.5–5.5)
PROTHROMBIN TIME: 11.5 SECONDS (ref 9.6–11.5)
RBC # BLD AUTO: 3.42 10*6/MM3 (ref 4.2–5.76)
SODIUM BLD-SCNC: 137 MMOL/L (ref 132–146)
UNIT  ABO: NORMAL
UNIT  RH: NORMAL
WBC NRBC COR # BLD: 12.81 10*3/MM3 (ref 3.5–10.8)

## 2017-07-13 PROCEDURE — 25010000002 INSULIN REGULAR HUMAN PER 5 UNITS: Performed by: PHYSICIAN ASSISTANT

## 2017-07-13 PROCEDURE — 97163 PT EVAL HIGH COMPLEX 45 MIN: CPT

## 2017-07-13 PROCEDURE — 85610 PROTHROMBIN TIME: CPT | Performed by: PHYSICIAN ASSISTANT

## 2017-07-13 PROCEDURE — 25010000002 KETOROLAC TROMETHAMINE PER 15 MG: Performed by: THORACIC SURGERY (CARDIOTHORACIC VASCULAR SURGERY)

## 2017-07-13 PROCEDURE — 83735 ASSAY OF MAGNESIUM: CPT | Performed by: PHYSICIAN ASSISTANT

## 2017-07-13 PROCEDURE — 25010000002 ONDANSETRON PER 1 MG: Performed by: PHYSICIAN ASSISTANT

## 2017-07-13 PROCEDURE — 25010000002 CEFUROXIME: Performed by: PHYSICIAN ASSISTANT

## 2017-07-13 PROCEDURE — 82962 GLUCOSE BLOOD TEST: CPT

## 2017-07-13 PROCEDURE — 63710000001 INSULIN DETEMIR PER 5 UNITS: Performed by: INTERNAL MEDICINE

## 2017-07-13 PROCEDURE — 93010 ELECTROCARDIOGRAM REPORT: CPT | Performed by: INTERNAL MEDICINE

## 2017-07-13 PROCEDURE — 85025 COMPLETE CBC W/AUTO DIFF WBC: CPT | Performed by: PHYSICIAN ASSISTANT

## 2017-07-13 PROCEDURE — 93005 ELECTROCARDIOGRAM TRACING: CPT | Performed by: PHYSICIAN ASSISTANT

## 2017-07-13 PROCEDURE — 99233 SBSQ HOSP IP/OBS HIGH 50: CPT | Performed by: INTERNAL MEDICINE

## 2017-07-13 PROCEDURE — 71010 HC CHEST PA OR AP: CPT

## 2017-07-13 PROCEDURE — 99024 POSTOP FOLLOW-UP VISIT: CPT | Performed by: THORACIC SURGERY (CARDIOTHORACIC VASCULAR SURGERY)

## 2017-07-13 PROCEDURE — 80069 RENAL FUNCTION PANEL: CPT | Performed by: PHYSICIAN ASSISTANT

## 2017-07-13 RX ORDER — LOSARTAN POTASSIUM 50 MG/1
50 TABLET ORAL
Status: DISCONTINUED | OUTPATIENT
Start: 2017-07-13 | End: 2017-07-17 | Stop reason: HOSPADM

## 2017-07-13 RX ORDER — TAMSULOSIN HYDROCHLORIDE 0.4 MG/1
0.4 CAPSULE ORAL DAILY
Status: DISCONTINUED | OUTPATIENT
Start: 2017-07-13 | End: 2017-07-17 | Stop reason: HOSPADM

## 2017-07-13 RX ORDER — MORPHINE SULFATE 2 MG/ML
2 INJECTION, SOLUTION INTRAMUSCULAR; INTRAVENOUS
Status: DISCONTINUED | OUTPATIENT
Start: 2017-07-13 | End: 2017-07-16

## 2017-07-13 RX ORDER — AMLODIPINE BESYLATE 5 MG/1
5 TABLET ORAL
Status: DISCONTINUED | OUTPATIENT
Start: 2017-07-13 | End: 2017-07-17 | Stop reason: HOSPADM

## 2017-07-13 RX ADMIN — ONDANSETRON 4 MG: 2 INJECTION INTRAMUSCULAR; INTRAVENOUS at 03:10

## 2017-07-13 RX ADMIN — CEFUROXIME 1.5 G: 1.5 INJECTION, POWDER, FOR SOLUTION INTRAVENOUS at 15:55

## 2017-07-13 RX ADMIN — SODIUM CHLORIDE 5 MG/HR: 9 INJECTION, SOLUTION INTRAVENOUS at 09:21

## 2017-07-13 RX ADMIN — METOPROLOL TARTRATE 25 MG: 25 TABLET ORAL at 19:34

## 2017-07-13 RX ADMIN — INSULIN DETEMIR 30 UNITS: 100 INJECTION, SOLUTION SUBCUTANEOUS at 18:50

## 2017-07-13 RX ADMIN — Medication 2 TABLET: at 16:23

## 2017-07-13 RX ADMIN — SODIUM CHLORIDE 3.6 UNITS/HR: 9 INJECTION, SOLUTION INTRAVENOUS at 16:28

## 2017-07-13 RX ADMIN — KETOROLAC TROMETHAMINE 30 MG: 30 INJECTION, SOLUTION INTRAMUSCULAR at 03:11

## 2017-07-13 RX ADMIN — METOPROLOL TARTRATE 25 MG: 25 TABLET ORAL at 11:44

## 2017-07-13 RX ADMIN — FAMOTIDINE 20 MG: 20 TABLET ORAL at 09:20

## 2017-07-13 RX ADMIN — ATORVASTATIN CALCIUM 40 MG: 40 TABLET, FILM COATED ORAL at 19:35

## 2017-07-13 RX ADMIN — AMLODIPINE BESYLATE 5 MG: 5 TABLET ORAL at 11:44

## 2017-07-13 RX ADMIN — TAMSULOSIN HYDROCHLORIDE 0.4 MG: 0.4 CAPSULE ORAL at 11:44

## 2017-07-13 RX ADMIN — SODIUM CHLORIDE 5 MG/HR: 9 INJECTION, SOLUTION INTRAVENOUS at 01:33

## 2017-07-13 RX ADMIN — CHLORHEXIDINE GLUCONATE 15 ML: 1.2 RINSE ORAL at 09:21

## 2017-07-13 RX ADMIN — Medication 2 TABLET: at 09:21

## 2017-07-13 RX ADMIN — SODIUM CHLORIDE 5 MG/HR: 9 INJECTION, SOLUTION INTRAVENOUS at 16:24

## 2017-07-13 RX ADMIN — LOSARTAN POTASSIUM 50 MG: 50 TABLET ORAL at 16:23

## 2017-07-13 RX ADMIN — SODIUM CHLORIDE 8.1 UNITS/HR: 9 INJECTION, SOLUTION INTRAVENOUS at 07:09

## 2017-07-13 RX ADMIN — ASPIRIN 325 MG: 325 TABLET, DELAYED RELEASE ORAL at 09:20

## 2017-07-13 RX ADMIN — OXYCODONE AND ACETAMINOPHEN 2 TABLET: 5; 325 TABLET ORAL at 19:34

## 2017-07-13 RX ADMIN — METOPROLOL TARTRATE 2.5 MG: 5 INJECTION INTRAVENOUS at 05:26

## 2017-07-13 RX ADMIN — CEFUROXIME 1.5 G: 1.5 INJECTION, POWDER, FOR SOLUTION INTRAVENOUS at 23:59

## 2017-07-13 RX ADMIN — FAMOTIDINE 20 MG: 20 TABLET ORAL at 16:23

## 2017-07-13 RX ADMIN — CEFUROXIME 1.5 G: 1.5 INJECTION, POWDER, FOR SOLUTION INTRAVENOUS at 09:20

## 2017-07-13 NOTE — NURSING NOTE
Order received for Phase II Cardiac Rehab. Staff will follow up once patient is transferred to telemetry.

## 2017-07-13 NOTE — PLAN OF CARE
Problem: Cardiac Surgery (Adult)  Goal: Signs and Symptoms of Listed Potential Problems Will be Absent or Manageable (Cardiac Surgery)  Outcome: Ongoing (interventions implemented as appropriate)    07/13/17 1803   Cardiac Surgery   Problems Assessed (Cardiac Surgery) all   Problems Present (Cardiac Surgery) situational response         Problem: Fall Risk (Adult)  Goal: Identify Related Risk Factors and Signs and Symptoms  Outcome: Ongoing (interventions implemented as appropriate)    07/13/17 1803   Fall Risk   Fall Risk: Related Risk Factors confusion/agitation;environment unfamiliar   Fall Risk: Signs and Symptoms presence of risk factors       Goal: Absence of Falls  Outcome: Ongoing (interventions implemented as appropriate)    07/13/17 1803   Fall Risk (Adult)   Absence of Falls achieves outcome         Problem: Pressure Ulcer Risk (Juan C Scale) (Adult,Obstetrics,Pediatric)  Goal: Identify Related Risk Factors and Signs and Symptoms  Outcome: Ongoing (interventions implemented as appropriate)    07/13/17 1803   Pressure Ulcer Risk (Juan C Scale)   Related Risk Factors (Pressure Ulcer Risk (Juan C Scale)) critical care admission       Goal: Skin Integrity  Outcome: Ongoing (interventions implemented as appropriate)    07/13/17 1803   Pressure Ulcer Risk (Juan C Scale) (Adult,Obstetrics,Pediatric)   Skin Integrity achieves outcome

## 2017-07-13 NOTE — THERAPY EVALUATION
Acute Care - Physical Therapy Initial Evaluation  [facility]     Patient Name: Jakob Boogie  : 1950  MRN: [id number]  Today's Date: 2017   Onset of Illness/Injury or Date of Surgery Date: (P) 17  Date of Referral to PT: (P) 17  Referring Physician: MD Mcmillan (P)      Admit Date: 2017     Visit Dx:    ICD-10-CM ICD-9-CM   1. Impaired functional mobility, balance, gait, and endurance Z74.09 V49.89   2. Coronary artery disease involving native coronary artery of native heart with angina pectoris I25.119 414.01     413.9     Patient Active Problem List   Diagnosis   • Transient cerebral ischemia   • Diabetes mellitus, type 2, uncontrolled with A1c of 9.7   • GERD (gastroesophageal reflux disease)   • Hypertension   • Lyme disease   • Hyperlipidemia   • Coronary artery disease   • CKD (chronic kidney disease), stage III   • Basilar artery stenosis   • CAD (coronary artery disease), status post CABG ×4 on 2017.     Past Medical History:   Diagnosis Date   • Abdominal hernia    • Arthritis    • Atrial fibrillation    • BPH (benign prostatic hypertrophy)    • CKD (chronic kidney disease), stage III     SEES DR ANDERSON IN [place], BUN 35, CR 1.7    • Coronary artery disease    • Diabetes mellitus, type 2     TYPE 2, CHECKS FSBG BID, LAST A1C  8.5    • GERD (gastroesophageal reflux disease)    • [place] (hard of hearing)    • Hyperlipidemia    • Hypertension    • TIA (transient ischemic attack) 2017    SLIGHT RIGHT SIDED WEAKNESS FROM KNEE TO HIP    • Wears reading eyeglasses      Past Surgical History:   Procedure Laterality Date   • AMPUTATION      left index   • CARDIAC CATHETERIZATION N/A 2017    Procedure: Left Heart Cath;  Surgeon: Eamon Mosqueda MD;  Location: [facility] INVASIVE LOCATION;  Service:    • CHOLECYSTECTOMY     • COLONOSCOPY     • CORONARY ARTERY BYPASS GRAFT N/A 2017    Procedure: MEDIAN STERNOTOMY,CORONARY ARTERY BYPASS WITH INTERNAL  MAMMARY ARTERY GRAFT x4 WITH ENDOSCOPIC VEIN HARVESTING OF THE RIGHT GREATER SAPHENOUS VEIN;  Surgeon: Joey Mcmillan MD;  Location: [facility] OR;  Service:    • CORONARY STENT PLACEMENT      2 stents placed   • ENDOSCOPY            PT ASSESSMENT (last 72 hours)      PT Evaluation       07/13/17 1400 07/13/17 1334    Rehab Evaluation    Document Type  (P)  evaluation  -KR    Subjective Information  (P)  agree to therapy;complains of;pain  -KR    Patient Effort, Rehab Treatment  (P)  good  -KR    Symptoms Noted During/After Treatment  (P)  shortness of breath  -KR    General Information    Patient Profile Review  (P)  yes  -KR    Onset of Illness/Injury or Date of Surgery Date  (P)  07/12/17  -KR    Referring Physician  (P)  MD Hipolito  -KR    Pertinent History Of Current Problem  (P)  Pt presents with hx of coronary artery stenting and pain, SOA with minimal activity. Cardiac catheterization demonstrates CAD. Pt also had previous TIA. CABGx4 performed on 7/12/2017.  -KR    Precautions/Limitations  (P)  cardiac precautions;fall precautions;oxygen therapy device and L/min;sternal precautions  -KR    Prior Level of Function  (P)  independent:;gait;transfer;ADL's;dressing;bathing  -KR    Equipment Currently Used at Home glucometer  -PP (P)  none  -KR    Plans/Goals Discussed With  (P)  patient;agreed upon  -KR    Risks Reviewed  (P)  patient:;LOB;increased discomfort;change in vital signs  -KR    Benefits Reviewed  (P)  patient:;improve function;increase balance;increase independence  -KR    Barriers to Rehab  (P)  none identified  -KR    Living Environment    Lives With spouse  -PP (P)  spouse  -KR    Living Arrangements house  -PP (P)  house  -KR    Home Accessibility  (P)  bed and bath on same level;tub/shower is not walk in  -KR    Stair Railings at Home  (P)  none  -KR    Type of Financial/Environmental Concern  (P)  none  -KR    Transportation Available car;family or friend will provide  -PP (P)  family or  friend will provide  -KR    Clinical Impression    Date of Referral to PT  (P)  07/12/17  -KR    PT Diagnosis  (P)  impaired functional mobility  -KR    Patient/Family Goals Statement  (P)  return to PLOF  -KR    Criteria for Skilled Therapeutic Interventions Met  (P)  yes;treatment indicated  -KR    Rehab Potential  (P)  good, to achieve stated therapy goals  -KR    Vital Signs    Pre Systolic BP Rehab  (P)  135  -KR    Pre Treatment Diastolic BP  (P)  74  -KR    Post Systolic BP Rehab  (P)  135  -KR    Post Treatment Diastolic BP  (P)  65  -KR    Pretreatment Heart Rate (beats/min)  (P)  96  -KR    Posttreatment Heart Rate (beats/min)  (P)  82  -KR    Pre SpO2 (%)  (P)  94  -KR    O2 Delivery Pre Treatment  (P)  supplemental O2  -KR    Post SpO2 (%)  (P)  95  -KR    O2 Delivery Post Treatment  (P)  supplemental O2  -KR    Pre Patient Position  (P)  Sitting  -KR    Intra Patient Position  (P)  Standing  -KR    Post Patient Position  (P)  Sitting  -KR    Pain Assessment    Pain Assessment  (P)  0-10  -KR    Pain Score  (P)  5  -KR    Post Pain Score  (P)  5  -KR    Pain Type  (P)  Surgical pain  -KR    Pain Location  (P)  Chest  -KR    Pain Intervention(s)  (P)  Repositioned;Ambulation/increased activity  -KR    Response to Interventions  (P)  tolerated  -KR    Cognitive Assessment/Intervention    Current Cognitive/Communication Assessment  (P)  impaired  -KR    Orientation Status  (P)  oriented to;person;time   pt confused; not making sense at times  -KR    Follows Commands/Answers Questions  (P)  able to follow single-step instructions;50% of the time;needs cueing;needs increased time;needs repetition   confused with some commands  -KR    Personal Safety  (P)  moderate impairment;decreased awareness, need for assist;decreased awareness, need for safety;decreased insight to deficits  -KR    Personal Safety Interventions  (P)  fall prevention program maintained;gait belt;nonskid shoes/slippers when out of bed  -KR     ROM (Range of Motion)    General ROM  (P)  lower extremity range of motion deficits identified  -KR    General ROM Detail  (P)  --   decreased DF bilaterally  -KR    MMT (Manual Muscle Testing)    General MMT Assessment Detail  (P)  BLE grossly 3+/5  -KR    Bed Mobility, Assessment/Treatment    Bed Mobility, Comment  (P)  UIC  -KR    Transfer Assessment/Treatment    Transfers, Sit-Stand [place]  (P)  moderate assist (50% patient effort);2 person assist required;verbal cues required  -KR    Transfers, Stand-Sit [place]  (P)  moderate assist (50% patient effort);2 person assist required;verbal cues required  -KR    Transfers, Sit-Stand-Sit, Assist Device  (P)  rolling walker  -KR    Transfer, Safety Issues  (P)  step length decreased;weight-shifting ability decreased  -KR    Transfer, Impairments  (P)  strength decreased;impaired balance  -KR    Transfer, Comment  (P)  VC's to maintain sternal precautions  -KR    Gait Assessment/Treatment    Gait, [place] Level  (P)  moderate assist (50% patient effort);2 person assist required;verbal cues required  -KR    Gait, Assistive Device  (P)  rolling walker  -KR    Gait, Distance (Feet)  (P)  200  -KR    Gait, Gait Pattern Analysis  (P)  swing-to gait   shuffling; small, quick steps  -KR    Gait, Gait Deviations  (P)  forward flexed posture;step length decreased;weight-shifting ability decreased  -KR    Gait, Safety Issues  (P)  step length decreased;weight-shifting ability decreased;supplemental O2;balance decreased during turns  -KR    Gait, Impairments  (P)  strength decreased;impaired balance;postural control impaired;coordination impaired  -KR    Gait, Comment  (P)  Pt pushes RW too far in front causing unsafe gait pattern. May do better without RW.  -KR    Motor Skills/Interventions    Additional Documentation  (P)  Balance Skills Training (Group)  -KR    Balance Skills Training    Sitting-Level of Assistance  (P)  Minimum assistance  -KR     Sitting-Balance Support  (P)  Right upper extremity supported;Left upper extremity supported;Feet supported  -KR    Standing-Level of Assistance  (P)  Minimum assistance;x2  -KR    Static Standing Balance Support  (P)  assistive device  -KR    Sensory Assessment/Intervention    Light Touch  (P)  LLE;RLE  -KR    LLE Light Touch  (P)  WNL  -KR    RLE Light Touch  (P)  WNL  -KR    Positioning and Restraints    Pre-Treatment Position  (P)  sitting in chair/recliner  -KR    Post Treatment Position  (P)  chair  -KR    In Chair  (P)  reclined;call light within reach;encouraged to call for assist;legs elevated  -KR      07/12/17 1500 07/12/17 1400    Muscle Tone Assessment    Muscle Tone Assessment Bilateral Upper Extremities;Bilateral Lower Extremities  -DC Bilateral Upper Extremities;Bilateral Lower Extremities  -DC    Bilateral Upper Extremities Muscle Tone Assessment flaccid  -DC flaccid  -DC    Bilateral Lower Extremities Muscle Tone Assessment flaccid  -DC flaccid  -DC      07/12/17 1300 07/12/17 1129    Muscle Tone Assessment    Muscle Tone Assessment Bilateral Upper Extremities;Bilateral Lower Extremities  -DC Bilateral Upper Extremities;Bilateral Lower Extremities  -KB    Bilateral Upper Extremities Muscle Tone Assessment flaccid  -DC flaccid  -KB    Bilateral Lower Extremities Muscle Tone Assessment flaccid  -DC flaccid  -KB      07/12/17 1000 07/11/17 1328    General Information    Equipment Currently Used at Home none  -DC none  -KM    Living Environment    Lives With spouse  -DC spouse  -KM    Living Arrangements house  -DC house  -KM    Home Accessibility bed and bath on same level  -DC bed and bath on same level  -KM    Stair Railings at Home none  -DC none  -KM    Type of Financial/Environmental Concern none  -DC none  -KM    Transportation Available car  -DC car  -KM      User Key  (r) = Recorded By, (t) = Taken By, (c) = Cosigned By    Initials Name Provider Type    BRIAN Pedroza RN Registered  Nurse    AARON Lane, RN Registered Nurse    ANTHONY Barney, KASANDRA Case Manager    DC Vandana Noonan, RN Registered Nurse    TANI Mi, PT Student PT Student          Physical Therapy Education     Title: PT OT SLP Therapies (Active)     Topic: Physical Therapy (Active)     Point: Mobility training (Active)    Learning Progress Summary    Learner Readiness Method Response Comment Documented by Status   Patient Acceptance E NR  KR 07/13/17 1452 Active               Point: Body mechanics (Active)    Learning Progress Summary    Learner Readiness Method Response Comment Documented by Status   Patient Acceptance E NR  KR 07/13/17 1452 Active               Point: Precautions (Active)    Learning Progress Summary    Learner Readiness Method Response Comment Documented by Status   Patient Acceptance E NR  KR 07/13/17 1452 Active                      User Key     Initials Effective Dates Name Provider Type Discipline    KR 05/30/17 -  Adelina Mi, PT Student PT Student PT                PT Recommendation and Plan  Anticipated Discharge Disposition: (P) home with assist, home with home health  PT Frequency: (P) daily  Plan of Care Review  Plan Of Care Reviewed With: (P) patient  Outcome Summary/Follow up Plan: (P) Pt presents with fatigue and SOA s/p CABGx4. Pt ambulated 200ft with RW and modAx2. Pt's decreased functional independence warrants PT skilled care. Recommend home with assistance and home health.           IP PT Goals       07/13/17 1452          Bed Mobility PT LTG    Bed Mobility PT LTG, Date Established (P)  07/13/17  -KR      Bed Mobility PT LTG, Time to Achieve (P)  2 wks  -KR      Bed Mobility PT LTG, Activity Type (P)  supine to sit/sit to supine  -KR      Bed Mobility PT LTG, [place] Level (P)  independent  -KR      Transfer Training PT LTG    Transfer Training PT LTG, Date Established (P)  07/13/17  -KR      Transfer Training PT LTG, Time to Achieve (P)  2 wks  -KR       Transfer Training PT LTG, Activity Type (P)  sit to stand/stand to sit  -KR      Transfer Training PT LTG, [place] Level (P)  independent  -KR      Gait Training PT LTG    Gait Training Goal PT LTG, Date Established (P)  07/13/17  -KR      Gait Training Goal PT LTG, Time to Achieve (P)  2 wks  -KR      Gait Training Goal PT LTG, [place] Level (P)  independent  -KR      Gait Training Goal PT LTG, Distance to Achieve (P)  400  -KR        User Key  (r) = Recorded By, (t) = Taken By, (c) = Cosigned By    Initials Name Provider Type    TANI Mi PT Student PT Student                Outcome Measures       07/13/17 1334          How much help from another person do you currently need...    Turning from your back to your side while in flat bed without using bedrails? (P)  3  -KR      Moving from lying on back to sitting on the side of a flat bed without bedrails? (P)  3  -KR      Moving to and from a bed to a chair (including a wheelchair)? (P)  3  -KR      Standing up from a chair using your arms (e.g., wheelchair, bedside chair)? (P)  2  -KR      Climbing 3-5 steps with a railing? (P)  2  -KR      To walk in hospital room? (P)  3  -KR      AM-PAC 6 Clicks Score (P)  16  -KR      Functional Assessment    Outcome Measure Options (P)  AM-PAC 6 Clicks Basic Mobility (PT)  -KR        User Key  (r) = Recorded By, (t) = Taken By, (c) = Cosigned By    Initials Name Provider Type    TANI Mi PT [place] PT Student           Time Calculation:         PT Charges       07/13/17 1456          Time Calculation    Start Time (P)  1334  -KR      PT Received On (P)  07/13/17  -KR      PT Goal Re-Cert Due Date (P)  07/23/17  -KR        User Key  (r) = Recorded By, (t) = Taken By, (c) = Cosigned By    Initials Name Provider Type    TANI Mi PT Student PT Student          Therapy Charges for Today     Code Description Service Date Service Provider Modifiers Qty    06376692853 HC PT EVAL HIGH COMPLEXITY 4  7/13/2017 Adelina Mi, PT Student GP 1    [id number] HC PT THER SUPP EA 15 MIN 7/13/2017 Adelina Mi, PT Student GP 2          PT G-Codes  Outcome Measure Options: (P) AM-PAC 6 Clicks Basic Mobility (PT)      Lisa Mi PT Student  7/13/2017

## 2017-07-13 NOTE — PLAN OF CARE
Problem: Patient Care Overview (Adult)  Goal: Plan of Care Review  Outcome: Ongoing (interventions implemented as appropriate)    07/13/17 1452   Coping/Psychosocial Response Interventions   Plan Of Care Reviewed With patient   Outcome Evaluation   Outcome Summary/Follow up Plan Pt presents with fatigue and SOA s/p CABGx4. Pt ambulated 200ft with RW and modAx2. Pt's decreased functional independence warrants PT skilled care. Recommend home with assistance and home health.          Problem: Inpatient Physical Therapy  Goal: Bed Mobility Goal LTG- PT  Outcome: Ongoing (interventions implemented as appropriate)    07/13/17 1452   Bed Mobility PT LTG   Bed Mobility PT LTG, Date Established 07/13/17   Bed Mobility PT LTG, Time to Achieve 2 wks   Bed Mobility PT LTG, Activity Type supine to sit/sit to supine   Bed Mobility PT LTG, [place] Level independent       Goal: Transfer Training Goal 1 LTG- PT  Outcome: Ongoing (interventions implemented as appropriate)    07/13/17 1452   Transfer Training PT LTG   Transfer Training PT LTG, Date Established 07/13/17   Transfer Training PT LTG, Time to Achieve 2 wks   Transfer Training PT LTG, Activity Type sit to stand/stand to sit   Transfer Training PT LTG, [place] Level independent       Goal: Gait Training Goal LTG- PT  Outcome: Ongoing (interventions implemented as appropriate)    07/13/17 1452   Gait Training PT LTG   Gait Training Goal PT LTG, Date Established 07/13/17   Gait Training Goal PT LTG, Time to Achieve 2 wks   Gait Training Goal PT LTG, [place] Level independent   Gait Training Goal PT LTG, Distance to Achieve 400

## 2017-07-13 NOTE — PROGRESS NOTES
Intensive Care Follow-up     Hospital:  LOS: 1 day   Mr. Jakob Boogie, 66 y.o. male is followed for:   CAD (coronary artery disease)        Subjective   Interval History:  Mr. Boogie tolerated extubation without difficulty. He continues on with a Cardene drip to maintain his blood pressure below be set level. He states that his pain is decently controlled. He is uncomfortable in his incision site however.    The patient's relevant past medical, surgical and social history were reviewed and updated in Epic as appropriate.        Objective     Infusions:    dexmedetomidine 0.2-1.5 mcg/kg/hr Last Rate: Stopped (07/12/17 1600)   dextrose 5 % with KCl 20 mEq 30 mL/hr Last Rate: 30 mL/hr (07/12/17 1214)   DOBUTamine 2-20 mcg/kg/min    DOPamine 2-20 mcg/kg/min    EPINEPHrine 0.02-0.3 mcg/kg/min    insulin regular infusion 1 unit/mL (CCU use) 0-50 Units/hr Last Rate: 8.1 Units/hr (07/13/17 0709)   niCARdipine 5-15 mg/hr Last Rate: 5 mg/hr (07/13/17 0921)   nitroglycerin 5-200 mcg/min Last Rate: Stopped (07/12/17 1501)   norepinephrine 0.02-0.3 mcg/kg/min    phenylephrine 0.5-3 mcg/kg/min    propofol 5-50 mcg/kg/min Last Rate: Stopped (07/12/17 1501)   sodium chloride 30 mL/hr    vasopressin 0.02-0.1 Units/min      Medications:    amLODIPine 5 mg Oral Q24H   aspirin 325 mg Oral Daily   atorvastatin 40 mg Oral Nightly   cefuroxime 1.5 g Intravenous Q8H   chlorhexidine 15 mL Mouth/Throat Q12H   famotidine 20 mg Intravenous BID   Or      famotidine 20 mg Oral BID   losartan 50 mg Oral Q24H   metoprolol tartrate 2.5 mg Intravenous Q6H   metoprolol tartrate 25 mg Oral Q12H   pharmacy consult - [place]  Does not apply Daily   sennosides-docusate sodium 2 tablet Oral BID   tamsulosin 0.4 mg Oral Daily       Vital Sign Min/Max for last 24 hours  Temp  Min: 95.6 °F (35.3 °C)  Max: 99.2 °F (37.3 °C)   BP  Min: 97/64  Max: 146/83   Pulse  Min: 64  Max: 82   Resp  Min: 16  Max: 24   SpO2  Min: 91 %  Max: 100 %   Flow (L/min)  Min: 5   "Max: 6       Input/Output for last 24 hour shift  07/12 0701 - 07/13 0700  In: 4650 [I.V.:2955]  Out: 5767 [Urine:5245]   Vent Mode: PS  FiO2 (%):  [40 %] 40 %  S RR:  [6-12] 6  PEEP/CPAP (cm H2O):  [5 cm H20] 5 cm H20  MT SUP:  [10 cm H20] 10 cm H20  MAP (cm H2O):  [8-11] 8  Objective:  General Appearance:  Comfortable, well-appearing and in no acute distress.    Vital signs: (most recent): Blood pressure 138/68, pulse 80, temperature 99.2 °F (37.3 °C), resp. rate 20, height 68\" (172.7 cm), weight 248 lb (112 kg), SpO2 96 %.  No fever.    Output: Producing urine.    HEENT: (A right introducer catheter is in place with pulmonary artery catheter.)    Lungs:  Normal respiratory rate and normal effort.  He is not in respiratory distress.  Breath sounds clear to auscultation.  No wheezes, rales, rhonchi or decreased breath sounds.    Heart: Normal rate.  Regular rhythm.  S1 normal and S2 normal.  No murmur, gallop or friction rub.   Chest: Symmetric chest wall expansion.   Abdomen: Abdomen is soft and non-distended.  Bowel sounds are normal.   There is no abdominal tenderness.     Extremities: Normal range of motion.  There is deformity.  There is no effusion, local swelling or dependent edema.  (The patient is missing index finger on his left hand.)  Neurological: Patient is alert and oriented to person, place and time.  Normal strength.    Pupils:  Pupils are equal, round, and reactive to light.  Pupils are equal.   Skin:  Warm.  No rash or cyanosis.               Results from last 7 days  Lab Units 07/13/17  0334 07/12/17 2359 07/12/17  1952 07/12/17  1537 07/12/17  1200   WBC 10*3/mm3 12.81*  --   --  8.91 7.60   HEMOGLOBIN g/dL 10.4* 10.4* 10.5* 9.8* 11.0*   PLATELETS 10*3/mm3 145*  --   --  138* 131*       Results from last 7 days  Lab Units 07/13/17  0334 07/12/17 2359 07/12/17  1952 07/12/17  1537 07/12/17  1200   SODIUM mmol/L 137  --   --  136 136   POTASSIUM mmol/L 4.1 4.0 4.0 3.9 4.3   CO2 mmol/L 24.0  --  "  --  28.0 23.0   BUN mg/dL 22  --   --  21 22   CREATININE mg/dL 1.80*  --   --  1.90* 1.80*   MAGNESIUM mg/dL 2.4  --   --  2.9* 3.6*   PHOSPHORUS mg/dL 4.1  --   --  2.4 3.4   GLUCOSE mg/dL 140*  --   --  175* 128*     Estimated Creatinine Clearance: 49.7 mL/min (by C-G formula based on Cr of 1.8).      Results from last 7 days  Lab Units 07/12/17  1640   PH, ARTERIAL pH units 7.345*   PCO2, ARTERIAL mm Hg 45.3   PO2 ART mm Hg 70.7*         I reviewed the patient's results and images.     Assessment/Plan   Impression      Principal Problem:    CAD (coronary artery disease), status post CABG ×4 on 07/12/2017.  Active Problems:    Transient cerebral ischemia    Diabetes mellitus, type 2, uncontrolled with A1c of 9.7    Hypertension    Hyperlipidemia    CKD (chronic kidney disease), stage III       Plan        We will increase the patient's Lopressor to 25 twice daily and restart the Norvasc.  Discontinue [place] catheter.  Physical therapy will start today.  Pulmonary hygiene has been encouraged.  We will continue to follow his renal function.  Follow-up labs have been ordered.    Plan of care and goals reviewed with mulitdisciplinary team at daily rounds.   I discussed the patient's findings and my recommendations with patient, family and nursing staff         Richard Lama MD, [facility]  Pulmonary and Critical Care Medicine  07/13/17 11:50 AM

## 2017-07-13 NOTE — PLAN OF CARE
Problem: Patient Care Overview (Adult)  Goal: Plan of Care Review  Outcome: Ongoing (interventions implemented as appropriate)    07/13/17 0235   Coping/Psychosocial Response Interventions   Plan Of Care Reviewed With patient;spouse   Patient Care Overview   Progress progress toward functional goals as expected   Outcome Evaluation   Outcome Summary/Follow up Plan S/P CABG 7/12/17 and stable. Pt has been hypertensive following surgery requring cardene and IV lopressor. Minimal output from chest tubes. Urine output has been optimal despite elevated CR (1.9). No acute events over night.        Goal: Adult Individualization and Mutuality  Outcome: Outcome(s) achieved Date Met:  07/13/17 07/13/17 0235   Individualization   Patient Specific Preferences Pt does not want to have visitors and is interested in resting.         Problem: Cardiac Surgery (Adult)  Goal: Signs and Symptoms of Listed Potential Problems Will be Absent or Manageable (Cardiac Surgery)  Outcome: Ongoing (interventions implemented as appropriate)

## 2017-07-13 NOTE — PROGRESS NOTES
Adult Nutrition  Assessment/PES    Patient Name:  Jakob Boogie  YOB: 1950  MRN: [id number]  Admit Date:  7/12/2017    Assessment Date:  7/13/2017        Reason for Assessment       07/13/17 1113    Reason for Assessment    Reason For Assessment/Visit multidisciplinary rounds    Time Spent (min) 20                        Nutrition Prescription Ordered       07/13/17 1113    Nutrition Prescription PO    Current PO Diet Clear Liquid    Common Modifiers Cardiac;Consistent Carbohydrate                Problem/Interventions:                    Nutrition Intervention       07/13/17 1113    Nutrition Intervention    RD/Tech Action Follow Tx progress;Care plan reviewd              Education/Evaluation       07/13/17 1113    Monitor/Evaluation    Monitor Per protocol          Electronically signed by:  Mamta Kline RD  07/13/17 11:13 AM

## 2017-07-13 NOTE — PROGRESS NOTES
[facility]       LOS: 1 day   Patient Care Team:  Provider Not In System as PCP - General        Subjective       Patient Denies:  Sob, palps.  Little sore.  Feels pretty good.  Up in chair      Vital Signs  Temp:  [95.6 °F (35.3 °C)-98.4 °F (36.9 °C)] 98.4 °F (36.9 °C)  Heart Rate:  [64-81] 72  Resp:  [16-24] 20  BP: ()/(62-83) 117/67  Arterial Line BP: ()/(36-98) 112/41  FiO2 (%):  [40 %] 40 %    Intake/Output Summary (Last 24 hours) at 07/13/17 1004  Last data filed at 07/13/17 0600   Gross per 24 hour   Intake             4650 ml   Output             5617 ml   Net             -967 ml          Physical Exam:     General Appearance:    A&O, in no acute distress       Neck:   No adenopathy, supple, trachea midline, no thyromegaly, no JVD       Lungs:     Clear to auscultation anteriorly,respirations regular, even and                  unlabored    Heart:    Regular rhythm and normal rate, normal S1 and S2, no            murmur, no gallop, + rub, no click   Chest Wall:    No abnormalities observed   Abdomen:     Normal bowel sounds, no masses, no organomegaly, soft               Extremities:   No edema, no cyanosis, no redness   Pulses:   Pulses palpable and equal bilaterally     Results Review:     I reviewed the patient's new clinical results.      WBC WBC   Date/Time Value Ref Range Status   07/13/2017 0334 12.81 (H) 3.50 - 10.80 10*3/mm3 Final   07/12/2017 1537 8.91 3.50 - 10.80 10*3/mm3 Final   07/12/2017 1200 7.60 3.50 - 10.80 10*3/mm3 Final   07/11/2017 1258 6.90 3.50 - 10.80 10*3/mm3 Final            HGB Hemoglobin   Date/Time Value Ref Range Status   07/13/2017 0334 10.4 (L) 13.1 - 17.5 g/dL Final   07/12/2017 2359 10.4 (L) 13.1 - 17.5 g/dL Final   07/12/2017 1952 10.5 (L) 13.1 - 17.5 g/dL Final   07/12/2017 1537 9.8 (L) 13.1 - 17.5 g/dL Final   07/12/2017 1200 11.0 (L) 13.1 - 17.5 g/dL Final   07/12/2017 1038 9.2 (L) 12.0 - 17.0  g/dL Final   07/12/2017 1013 9.2 (L) 12.0 - 17.0 g/dL Final   07/12/2017 0951 8.8 (L) 12.0 - 17.0 g/dL Final   07/12/2017 0940 8.8 (L) 12.0 - 17.0 g/dL Final   07/12/2017 0931 8.5 (L) 12.0 - 17.0 g/dL Final   07/12/2017 0848 10.9 (L) 12.0 - 17.0 g/dL Final   07/12/2017 0723 11.9 (L) 12.0 - 17.0 g/dL Final   07/11/2017 1258 13.0 (L) 13.1 - 17.5 g/dL Final           HCT Hematocrit   Date/Time Value Ref Range Status   07/13/2017 0334 31.1 (L) 38.9 - 50.9 % Final   07/12/2017 2359 30.5 (L) 38.9 - 50.9 % Final   07/12/2017 1952 30.4 (L) 38.9 - 50.9 % Final   07/12/2017 1537 28.8 (L) 38.9 - 50.9 % Final   07/12/2017 1200 33.1 (L) 38.9 - 50.9 % Final   07/12/2017 1038 27 (L) 38 - 51 % Final   07/12/2017 1013 27 (L) 38 - 51 % Final   07/12/2017 0951 26 (L) 38 - 51 % Final   07/12/2017 0940 26 (L) 38 - 51 % Final   07/12/2017 0931 25 (L) 38 - 51 % Final   07/12/2017 0848 32 (L) 38 - 51 % Final   07/12/2017 0723 35 (L) 38 - 51 % Final   07/11/2017 1258 38.9 38.9 - 50.9 % Final            Platlets No results found for: LABPLAT  Sodium  Sodium   Date/Time Value Ref Range Status   07/13/2017 0334 137 132 - 146 mmol/L Final   07/12/2017 1537 136 132 - 146 mmol/L Final   07/12/2017 1200 136 132 - 146 mmol/L Final   07/11/2017 1258 135 132 - 146 mmol/L Final     Potassium  Potassium   Date/Time Value Ref Range Status   07/13/2017 0334 4.1 3.5 - 5.5 mmol/L Final   07/12/2017 2359 4.0 3.5 - 5.5 mmol/L Final   07/12/2017 1952 4.0 3.5 - 5.5 mmol/L Final   07/12/2017 1537 3.9 3.5 - 5.5 mmol/L Final   07/12/2017 1200 4.3 3.5 - 5.5 mmol/L Final   07/11/2017 1258 4.7 3.5 - 5.5 mmol/L Final     Chloride  Chloride   Date/Time Value Ref Range Status   07/13/2017 0334 105 99 - 109 mmol/L Final   07/12/2017 1537 105 99 - 109 mmol/L Final   07/12/2017 1200 106 99 - 109 mmol/L Final   07/11/2017 1258 101 99 - 109 mmol/L Final     BicarbonateNo results found for: PLASMABICARB    BUN BUN   Date/Time Value Ref Range Status   07/13/2017 0334 22 9 -  23 mg/dL Final   07/12/2017 1537 21 9 - 23 mg/dL Final   07/12/2017 1200 22 9 - 23 mg/dL Final   07/11/2017 1258 25 (H) 9 - 23 mg/dL Final      Creatinine Creatinine   Date/Time Value Ref Range Status   07/13/2017 0334 1.80 (H) 0.60 - 1.30 mg/dL Final   07/12/2017 1537 1.90 (H) 0.60 - 1.30 mg/dL Final   07/12/2017 1200 1.80 (H) 0.60 - 1.30 mg/dL Final   07/11/2017 1258 1.80 (H) 0.60 - 1.30 mg/dL Final      Calcium Calcium   Date/Time Value Ref Range Status   07/13/2017 0334 9.7 8.7 - 10.4 mg/dL Final   07/12/2017 1537 10.1 8.7 - 10.4 mg/dL Final   07/12/2017 1200 10.3 8.7 - 10.4 mg/dL Final   07/11/2017 1258 10.0 8.7 - 10.4 mg/dL Final      Mag Magnesium   Date/Time Value Ref Range Status   07/13/2017 0334 2.4 1.3 - 2.7 mg/dL Final   07/12/2017 1537 2.9 (H) 1.3 - 2.7 mg/dL Final   07/12/2017 1200 3.6 (H) 1.3 - 2.7 mg/dL Final   07/11/2017 1258 1.9 1.3 - 2.7 mg/dL Final           PT/INR:    Protime   Date Value Ref Range Status   07/13/2017 11.5 9.6 - 11.5 Seconds Final   07/12/2017 12.6 (H) 9.6 - 11.5 Seconds Final   07/11/2017 11.1 9.6 - 11.5 Seconds Final   /  INR   Date Value Ref Range Status   07/13/2017 1.05  Final   07/12/2017 1.15  Final   07/11/2017 1.02  Final     Troponin I     Lab Results   Component Value Date    TROPONINI 0.55 06/30/2014         amLODIPine 5 mg Oral Q24H   aspirin 325 mg Oral Daily   atorvastatin 40 mg Oral Nightly   cefuroxime 1.5 g Intravenous Q8H   chlorhexidine 15 mL Mouth/Throat Q12H   famotidine 20 mg Intravenous BID   Or      famotidine 20 mg Oral BID   metoprolol tartrate 2.5 mg Intravenous Q6H   metoprolol tartrate 25 mg Oral Q12H   pharmacy consult - MTM  Does not apply Daily   sennosides-docusate sodium 2 tablet Oral BID   tamsulosin 0.4 mg Oral Daily       dexmedetomidine 0.2-1.5 mcg/kg/hr Last Rate: Stopped (07/12/17 1600)   dextrose 5 % with KCl 20 mEq 30 mL/hr Last Rate: 30 mL/hr (07/12/17 1214)   DOBUTamine 2-20 mcg/kg/min    DOPamine 2-20 mcg/kg/min    EPINEPHrine  0.02-0.3 mcg/kg/min    insulin regular infusion 1 unit/mL (CCU use) 0-50 Units/hr Last Rate: 8.1 Units/hr (07/13/17 0709)   niCARdipine 5-15 mg/hr Last Rate: 5 mg/hr (07/13/17 0921)   nitroglycerin 5-200 mcg/min Last Rate: Stopped (07/12/17 1501)   norepinephrine 0.02-0.3 mcg/kg/min    phenylephrine 0.5-3 mcg/kg/min    propofol 5-50 mcg/kg/min Last Rate: Stopped (07/12/17 1501)   sodium chloride 30 mL/hr    vasopressin 0.02-0.1 Units/min        Assessment/Plan     Patient Active Problem List   Diagnosis Code   • Transient cerebral ischemia G45.9   • Diabetes mellitus, type 2, uncontrolled with A1c of 9.7 E11.9   • GERD (gastroesophageal reflux disease) K21.9   • Hypertension I10   • Lyme disease A69.20   • Hyperlipidemia E78.5   • Coronary artery disease I25.10   • CKD (chronic kidney disease), stage III N18.3   • Basilar artery stenosis I65.1   • CAD (coronary artery disease), status post CABG ×4 on 07/12/2017. I25.10     Progressing after CABG  Increase bp meds  Wean JANAK Martin  07/13/17  10:04 AM

## 2017-07-13 NOTE — PROGRESS NOTES
CTS Progress Note       LOS: 1 day   Patient Care Team:  Provider Not In System as PCP - General        Vital Signs  Temp:  [95.6 °F (35.3 °C)-98.4 °F (36.9 °C)] 98.4 °F (36.9 °C)  Heart Rate:  [64-81] 72  Resp:  [16-24] 20  BP: ()/(62-83) 122/66  Arterial Line BP: ()/(36-98) 116/42  FiO2 (%):  [40 %] 40 %    Physical Exam: Extubated.  Up in chair.  No neurologic deficits       Results     Results from last 7 days  Lab Units 07/13/17  0334   WBC 10*3/mm3 12.81*   HEMOGLOBIN g/dL 10.4*   HEMATOCRIT % 31.1*   PLATELETS 10*3/mm3 145*       Results from last 7 days  Lab Units 07/13/17  0334   SODIUM mmol/L 137   POTASSIUM mmol/L 4.1   CHLORIDE mmol/L 105   CO2 mmol/L 24.0   BUN mg/dL 22   CREATININE mg/dL 1.80*   GLUCOSE mg/dL 140*   CALCIUM mg/dL 9.7           Imaging Results (last 24 hours)     Procedure Component Value Units Date/Time    XR Chest 1 View [[id number]] Collected:  07/12/17 1504     Updated:  07/12/17 1505    Narrative:       EXAMINATION: XR CHEST 1 VW-07/12/2017:      INDICATION: Postop Check Line & Tube Placement;  I25.119-Atherosclerotic heart disease of native coronary artery with  unspecified angina pectoris.      COMPARISON: NONE.     FINDINGS: Portable chest reveals degenerative changes identified within  the spine. The patient is status post median sternotomy. There is an  endotracheal tube in satisfactory position above the griselda. There is a  left chest tube in place. There is a [place]-Beatrice catheter identified with  tip in the main outflow tract. Degenerative changes seen within the  spine. Small left pleural effusion. No pneumothorax.           Impression:       Status post median sternotomy with lines and tubes in  satisfactory position. Mild increased markings identified at the left  lung base with small left pleural effusion.     D:  07/12/2017  E:  07/12/2017             XR Chest 1 View [[id number]] Updated:  07/13/17 0542          Assessment    Principal Problem:    CAD (coronary  artery disease), status post CABG ×4 on 07/12/2017.  Active Problems:    Transient cerebral ischemia    Diabetes mellitus, type 2, uncontrolled with A1c of 9.7    Hypertension    Hyperlipidemia    CKD (chronic kidney disease), stage III    Patient less than 24 hours status post coronary surgery.  Satisfactory progress thus far still on low-dose Cardene.    Plan   Discontinue [place]-Beatrice catheter    Please note that portions of this note were completed with a voice recognition program. Efforts were made to edit the dictations, but occasionally words are mistranscribed.    Joey Mcmillan MD  07/13/17  6:26 AM

## 2017-07-13 NOTE — PROGRESS NOTES
Discharge Planning Assessment  [facility]     Patient Name: Jakob Boogie  MRN: [id number]  Today's Date: 7/13/2017    Admit Date: 7/12/2017          Discharge Needs Assessment       07/13/17 1400    Living Environment    Lives With spouse    Living Arrangements house    Transportation Available car;family or friend will provide    Living Environment    Provides Primary Care For no one    Quality Of Family Relationships supportive    Able to Return to Prior Living Arrangements yes    Discharge Needs Assessment    Concerns To Be Addressed no discharge needs identified;denies needs/concerns at this time    Readmission Within The Last 30 Days no previous admission in last 30 days    Anticipated Changes Related to Illness none    Equipment Currently Used at Home glucometer    Equipment Needed After Discharge none    Discharge Disposition home or self-care    Discharge Contact Information if Applicable [phone]    Discharge Planning Comments home with wife to [facility]            Discharge Plan       07/13/17 1402    Case Management/Social Work Plan    Plan home with wife to [facility]    Patient/Family In Agreement With Plan yes    Additional Comments Mr. Boogie lives with his wife, Tiffany who is currently at bedside, in [facility]. He was independent PTA. Had no use of home health or DME services. States his wife will be avlb to assist post discharge and he has ample support. Currently he has no concerns or needs identified at this time. CM will continue to follow.         Discharge Placement     No information found        Expected Discharge Date and Time     Expected Discharge Date Expected Discharge Time    Jul 17, 2017               Demographic Summary       07/13/17 6012    Referral Information    Admission Type inpatient    Arrived From home or self-care    Referral Source admission list;physician    Reason For Consult discharge planning    Record Reviewed clinical discipline  documentation;history and physical;medical record    Contact Information    Permission Granted to Share Information With     Primary Care Physician Information    Name  Katiuska            Functional Status       07/13/17 6655    Functional Status Current    Ambulation 2-->assistive person    Transferring 2-->assistive person    Toileting 2-->assistive person    Bathing 2-->assistive person    Dressing 2-->assistive person    Eating 0-->independent    Communication 0-->understands/communicates without difficulty    Swallowing (if score 2 or more for any item, consult Rehab Services) 0-->swallows foods/liquids without difficulty    Change in Functional Status Since Onset of Current Illness/Injury yes    Functional Status Prior    Ambulation 0-->independent    Transferring 0-->independent    Toileting 0-->independent    Bathing 0-->independent    Dressing 0-->independent    Eating 0-->independent    Communication 0-->understands/communicates without difficulty    Swallowing 0-->swallows foods/liquids without difficulty    IADL    Medications independent   has rx drug coverage with no issues in obtaining or affording copays    Meal Preparation independent    Housekeeping independent    Laundry independent    Shopping independent    Oral Care independent    Activity Tolerance    Current Activity Limitations driving restrictions;lifting restrictions    Usual Activity Tolerance good    Current Activity Tolerance moderate    Cognitive/Perceptual/Developmental    Current Mental Status/Cognitive Functioning no deficits noted    Recent Changes in Mental Status/Cognitive Functioning no changes    Employment/Financial    Financial Concerns none   has mutual of [employer] and medicare A only with no recent changes to coverage            Psychosocial     None            Abuse/Neglect     None            Legal     None            Substance Abuse     None            Patient Forms     None          Ale MARIA  KASANDRA Barney

## 2017-07-14 ENCOUNTER — APPOINTMENT (OUTPATIENT)
Dept: GENERAL RADIOLOGY | Facility: HOSPITAL | Age: 67
End: 2017-07-14

## 2017-07-14 LAB
ANION GAP SERPL CALCULATED.3IONS-SCNC: 6 MMOL/L (ref 3–11)
BACTERIA SPEC AEROBE CULT: NORMAL
BUN BLD-MCNC: 22 MG/DL (ref 9–23)
BUN/CREAT SERPL: 10.5 (ref 7–25)
CALCIUM SPEC-SCNC: 9.2 MG/DL (ref 8.7–10.4)
CHLORIDE SERPL-SCNC: 103 MMOL/L (ref 99–109)
CO2 SERPL-SCNC: 25 MMOL/L (ref 20–31)
CREAT BLD-MCNC: 2.1 MG/DL (ref 0.6–1.3)
DEPRECATED RDW RBC AUTO: 47.5 FL (ref 37–54)
ERYTHROCYTE [DISTWIDTH] IN BLOOD BY AUTOMATED COUNT: 13.3 % (ref 11.3–14.5)
GFR SERPL CREATININE-BSD FRML MDRD: 32 ML/MIN/1.73
GLUCOSE BLD-MCNC: 134 MG/DL (ref 70–100)
GLUCOSE BLDC GLUCOMTR-MCNC: 113 MG/DL (ref 70–130)
GLUCOSE BLDC GLUCOMTR-MCNC: 114 MG/DL (ref 70–130)
GLUCOSE BLDC GLUCOMTR-MCNC: 124 MG/DL (ref 70–130)
GLUCOSE BLDC GLUCOMTR-MCNC: 124 MG/DL (ref 70–130)
GLUCOSE BLDC GLUCOMTR-MCNC: 138 MG/DL (ref 70–130)
GLUCOSE BLDC GLUCOMTR-MCNC: 145 MG/DL (ref 70–130)
GLUCOSE BLDC GLUCOMTR-MCNC: 157 MG/DL (ref 70–130)
GLUCOSE BLDC GLUCOMTR-MCNC: 158 MG/DL (ref 70–130)
GLUCOSE BLDC GLUCOMTR-MCNC: 161 MG/DL (ref 70–130)
GLUCOSE BLDC GLUCOMTR-MCNC: 198 MG/DL (ref 70–130)
GLUCOSE BLDC GLUCOMTR-MCNC: 205 MG/DL (ref 70–130)
GLUCOSE BLDC GLUCOMTR-MCNC: 238 MG/DL (ref 70–130)
GLUCOSE BLDC GLUCOMTR-MCNC: 242 MG/DL (ref 70–130)
GLUCOSE BLDC GLUCOMTR-MCNC: 297 MG/DL (ref 70–130)
HCT VFR BLD AUTO: 30.2 % (ref 38.9–50.9)
HGB BLD-MCNC: 9.9 G/DL (ref 13.1–17.5)
MCH RBC QN AUTO: 32.2 PG (ref 27–31)
MCHC RBC AUTO-ENTMCNC: 32.8 G/DL (ref 32–36)
MCV RBC AUTO: 98.4 FL (ref 80–99)
PLATELET # BLD AUTO: 143 10*3/MM3 (ref 150–450)
PMV BLD AUTO: 11.6 FL (ref 6–12)
POTASSIUM BLD-SCNC: 4.4 MMOL/L (ref 3.5–5.5)
RBC # BLD AUTO: 3.07 10*6/MM3 (ref 4.2–5.76)
SODIUM BLD-SCNC: 134 MMOL/L (ref 132–146)
WBC NRBC COR # BLD: 15.85 10*3/MM3 (ref 3.5–10.8)

## 2017-07-14 PROCEDURE — 85027 COMPLETE CBC AUTOMATED: CPT | Performed by: PHYSICIAN ASSISTANT

## 2017-07-14 PROCEDURE — 63710000001 INSULIN DETEMIR PER 5 UNITS: Performed by: INTERNAL MEDICINE

## 2017-07-14 PROCEDURE — 93010 ELECTROCARDIOGRAM REPORT: CPT | Performed by: INTERNAL MEDICINE

## 2017-07-14 PROCEDURE — 63710000001 INSULIN LISPRO (HUMAN) PER 5 UNITS: Performed by: INTERNAL MEDICINE

## 2017-07-14 PROCEDURE — 25010000002 MORPHINE SULFATE (PF) 2 MG/ML SOLUTION

## 2017-07-14 PROCEDURE — 25010000002 INSULIN REGULAR HUMAN PER 5 UNITS: Performed by: PHYSICIAN ASSISTANT

## 2017-07-14 PROCEDURE — 80048 BASIC METABOLIC PNL TOTAL CA: CPT | Performed by: PHYSICIAN ASSISTANT

## 2017-07-14 PROCEDURE — 93005 ELECTROCARDIOGRAM TRACING: CPT | Performed by: PHYSICIAN ASSISTANT

## 2017-07-14 PROCEDURE — 97110 THERAPEUTIC EXERCISES: CPT

## 2017-07-14 PROCEDURE — 82962 GLUCOSE BLOOD TEST: CPT

## 2017-07-14 PROCEDURE — 99024 POSTOP FOLLOW-UP VISIT: CPT | Performed by: THORACIC SURGERY (CARDIOTHORACIC VASCULAR SURGERY)

## 2017-07-14 PROCEDURE — 99233 SBSQ HOSP IP/OBS HIGH 50: CPT | Performed by: INTERNAL MEDICINE

## 2017-07-14 PROCEDURE — 71010 HC CHEST PA OR AP: CPT

## 2017-07-14 RX ORDER — AMLODIPINE BESYLATE 5 MG/1
5 TABLET ORAL DAILY
COMMUNITY

## 2017-07-14 RX ORDER — DEXTROSE MONOHYDRATE 25 G/50ML
25 INJECTION, SOLUTION INTRAVENOUS
Status: DISCONTINUED | OUTPATIENT
Start: 2017-07-14 | End: 2017-07-17 | Stop reason: HOSPADM

## 2017-07-14 RX ORDER — NICOTINE POLACRILEX 4 MG
15 LOZENGE BUCCAL
Status: DISCONTINUED | OUTPATIENT
Start: 2017-07-14 | End: 2017-07-17 | Stop reason: HOSPADM

## 2017-07-14 RX ADMIN — MORPHINE SULFATE 2 MG: 2 INJECTION, SOLUTION INTRAMUSCULAR; INTRAVENOUS at 20:38

## 2017-07-14 RX ADMIN — INSULIN DETEMIR 30 UNITS: 100 INJECTION, SOLUTION SUBCUTANEOUS at 20:01

## 2017-07-14 RX ADMIN — ATORVASTATIN CALCIUM 40 MG: 40 TABLET, FILM COATED ORAL at 19:50

## 2017-07-14 RX ADMIN — Medication 2 TABLET: at 18:01

## 2017-07-14 RX ADMIN — METOPROLOL TARTRATE 25 MG: 25 TABLET ORAL at 19:50

## 2017-07-14 RX ADMIN — Medication 2 TABLET: at 09:08

## 2017-07-14 RX ADMIN — OXYCODONE AND ACETAMINOPHEN 2 TABLET: 5; 325 TABLET ORAL at 04:38

## 2017-07-14 RX ADMIN — SODIUM CHLORIDE 7.5 MG/HR: 9 INJECTION, SOLUTION INTRAVENOUS at 05:43

## 2017-07-14 RX ADMIN — ASPIRIN 325 MG: 325 TABLET, DELAYED RELEASE ORAL at 09:09

## 2017-07-14 RX ADMIN — AMLODIPINE BESYLATE 5 MG: 5 TABLET ORAL at 09:09

## 2017-07-14 RX ADMIN — OXYCODONE AND ACETAMINOPHEN 2 TABLET: 5; 325 TABLET ORAL at 19:38

## 2017-07-14 RX ADMIN — INSULIN LISPRO 5 UNITS: 100 INJECTION, SOLUTION INTRAVENOUS; SUBCUTANEOUS at 11:38

## 2017-07-14 RX ADMIN — INSULIN LISPRO 5 UNITS: 100 INJECTION, SOLUTION INTRAVENOUS; SUBCUTANEOUS at 16:28

## 2017-07-14 RX ADMIN — FAMOTIDINE 20 MG: 20 TABLET ORAL at 09:09

## 2017-07-14 RX ADMIN — TAMSULOSIN HYDROCHLORIDE 0.4 MG: 0.4 CAPSULE ORAL at 09:08

## 2017-07-14 RX ADMIN — METOPROLOL TARTRATE 25 MG: 25 TABLET ORAL at 09:09

## 2017-07-14 RX ADMIN — LOSARTAN POTASSIUM 50 MG: 50 TABLET ORAL at 10:39

## 2017-07-14 RX ADMIN — SODIUM CHLORIDE 10 MG/HR: 9 INJECTION, SOLUTION INTRAVENOUS at 00:00

## 2017-07-14 RX ADMIN — FAMOTIDINE 20 MG: 20 TABLET ORAL at 18:01

## 2017-07-14 RX ADMIN — INSULIN LISPRO 2 UNITS: 100 INJECTION, SOLUTION INTRAVENOUS; SUBCUTANEOUS at 11:38

## 2017-07-14 RX ADMIN — HYDROCODONE BITARTRATE AND ACETAMINOPHEN 1 TABLET: 7.5; 325 TABLET ORAL at 20:40

## 2017-07-14 RX ADMIN — INSULIN LISPRO 4 UNITS: 100 INJECTION, SOLUTION INTRAVENOUS; SUBCUTANEOUS at 20:49

## 2017-07-14 RX ADMIN — INSULIN DETEMIR 30 UNITS: 100 INJECTION, SOLUTION SUBCUTANEOUS at 10:38

## 2017-07-14 RX ADMIN — SODIUM CHLORIDE 6.8 UNITS/HR: 9 INJECTION, SOLUTION INTRAVENOUS at 00:04

## 2017-07-14 NOTE — CONSULTS
"Diabetes Education  Assessment/Teaching    Patient Name:  Jakob Boogie  YOB: 1950  MRN: [id number]  Admit Date:  7/12/2017      Assessment Date:  7/14/2017       Most Recent Value    General Information      Referral From:  MD allen, A1c    Height  5' 8\" (1.727 m)    Height Method  Stated    Weight  248 lb (112 kg)    Weight Method  Stated    Pregnancy Assessment     Diabetes History     What type of diabetes do you have?  Type 2    Length of Diabetes Diagnosis  10 + years    Current DM knowledge  excellent    Have you had diabetes education/teaching in the past?  yes    When and where was your diabetes education?  Patient cannot recall exact times or dates when asked.     Do you test your blood sugar at home?  yes    Frequency of checks  twice daily    Meter type  one touch mini    Who performs the test?  self    Have you had low blood sugar? (<70mg/dl)  no    Have you had high blood sugar? (>140mg/dl)  yes    How often do you have high blood sugar?  frequently    How would you rate your diabetes control?  fair    How often do you check your feet?  weekly    Do you perform your own nail care?  yes    Education Preferences     What areas of diabetes would you like to learn about?  avoiding high blood sugar, avoiding low blood sugar, testing my blood sugar at home, diabetes complications    Nutrition Information     Are you currently following a special meal plan?  frequently    Do you eat mostly at home or out of the house?  at home    Assessment Topics     DM Goals                Most Recent Value    DM Education Needs     Meter  Has own    Meter Type  One Touch    Frequency of Testing  2 times a day    Medication  Oral, Insulin    Problem Solving  Hyperglycemia, Hypoglycemia, Sick days, Treatment, Signs, Symptoms    Reducing Risks  A1C testing, Blood pressure, Cardiovascular    Physical Activity  -- [He will begin cardiac rehab after discharge. ]    Healthy Coping  Appropriate    Discharge Plan  " Home    Motivation  Moderate    Teaching Method  Explanation, Discussion, Handouts    Patient Response  Verbalized understanding         Discussed and taught patient about type 2 diabetes self-management, risk factors, and importance of blood glucose control to reduce complications. Target blood glucose readings and A1c goals per ADA were reviewed. Reviewed with patient current A1c and discussed its significance. Signs, symptoms and treatment of hyperglycemia and hypoglycemia were discussed. Lifestyle changes such as physical activity with MD approval and healthy eating were encouraged. Stressed the importance of strict blood sugar control after surgery to prevent complications such as infection and to promote healing of incision.  Pt instructed to  check blood sugar 4 times per day and to call PCP if  Blood glucose is higher than 180 two times or more.  Patient was encouraged to keep record of blood glucose readings to take to follow up appointment with PCP.  Pt was offered a free op follow up appointment however declined at this time. Brochure on outpatient education given, as well as recent A1C. His wife, who was present during visit, states he was recently taken off of metformin due to kidney issues and she feels that his other meds have not been adjusted correctly. Encouraged him to follow up with PCP at time of discharge, regarding recent elevated blood sugars. Both voice their understanding of importance of glucose control, especially after a major surgery. My contact info was given. Thank you for this referral. Nati Wilson RN Diabetes Education            Electronically signed by:  Nati Wilson RN  07/14/17 1:29 PM

## 2017-07-14 NOTE — PLAN OF CARE
Problem: Patient Care Overview (Adult)  Goal: Plan of Care Review  Outcome: Ongoing (interventions implemented as appropriate)    07/14/17 0224   Coping/Psychosocial Response Interventions   Plan Of Care Reviewed With patient   Patient Care Overview   Progress progress toward functional goals as expected   Outcome Evaluation   Outcome Summary/Follow up Plan Pt continues to require cardene for htn despite increased PO BP meds.  When asked pt is a&o x4 but has bouts of confusion but re-orients well. No acute events over shift.         Problem: Cardiac Surgery (Adult)  Goal: Signs and Symptoms of Listed Potential Problems Will be Absent or Manageable (Cardiac Surgery)  Outcome: Ongoing (interventions implemented as appropriate)    07/14/17 0224   Cardiac Surgery   Problems Assessed (Cardiac Surgery) all   Problems Present (Cardiac Surgery) situational response         Problem: Fall Risk (Adult)  Goal: Identify Related Risk Factors and Signs and Symptoms  Outcome: Ongoing (interventions implemented as appropriate)    07/14/17 0224   Fall Risk   Fall Risk: Related Risk Factors confusion/agitation;environment unfamiliar   Fall Risk: Signs and Symptoms presence of risk factors       Goal: Absence of Falls  Outcome: Ongoing (interventions implemented as appropriate)    07/14/17 0224   Fall Risk (Adult)   Absence of Falls making progress toward outcome

## 2017-07-14 NOTE — PROGRESS NOTES
CTS Progress Note       LOS: 2 days   Patient Care Team:  Provider Not In System as PCP - General        Vital Signs  Temp:  [98.3 °F (36.8 °C)-99.2 °F (37.3 °C)] 98.3 °F (36.8 °C)  Heart Rate:  [] 87  Resp:  [18-20] 20  BP: (103-160)/(52-77) 126/62  Arterial Line BP: (120-126)/(47-55) 126/55    Physical Exam: Up in chair.  Sternum stable no neurologic deficits       Results     Results from last 7 days  Lab Units 07/14/17  0330   WBC 10*3/mm3 15.85*   HEMOGLOBIN g/dL 9.9*   HEMATOCRIT % 30.2*   PLATELETS 10*3/mm3 143*       Results from last 7 days  Lab Units 07/14/17  0330   SODIUM mmol/L 134   POTASSIUM mmol/L 4.4   CHLORIDE mmol/L 103   CO2 mmol/L 25.0   BUN mg/dL 22   CREATININE mg/dL 2.10*   GLUCOSE mg/dL 134*   CALCIUM mg/dL 9.2           Imaging Results (last 24 hours)     Procedure Component Value Units Date/Time    XR Chest 1 View [[id number]] Collected:  07/12/17 1504     Updated:  07/13/17 0842    Narrative:       EXAMINATION: XR CHEST 1 VW-07/12/2017:      INDICATION: Postop Check Line & Tube Placement;  I25.119-Atherosclerotic heart disease of native coronary artery with  unspecified angina pectoris.      COMPARISON: NONE.     FINDINGS: Portable chest reveals degenerative changes identified within  the spine. The patient is status post median sternotomy. There is an  endotracheal tube in satisfactory position above the griselda. There is a  left chest tube in place. There is a [place]-Beatrice catheter identified with  tip in the main outflow tract. Degenerative changes seen within the  spine. Small left pleural effusion. No pneumothorax.           Impression:       Status post median sternotomy with lines and tubes in  satisfactory position. Mild increased markings identified at the left  lung base with small left pleural effusion.     D:  07/12/2017  E:  07/12/2017     This report was finalized on 7/13/2017 8:40 AM by Dr. Sammi Man MD.       XR Chest 1 View [[id number]] Collected:  07/13/17 0855      Updated:  07/13/17 0855    Narrative:       EXAMINATION: XR CHEST 1 VW-      INDICATION: Postop heart surgery; I25.119-Atherosclerotic heart disease  of native coronary artery with unspecified angina pectoris.      COMPARISON: 07/12/2017.     FINDINGS: Cardiac silhouette is large. There is patchy left lower lobe  infiltrate. A left chest tube is in position. There is no pneumothorax.  There is also patchy right upper and right lower lobe airspace disease.  There are postoperative cardiac changes.           Impression:       Mild residual left lower lobe airspace disease.  This is  slightly improved when compared with 07/12/2017. However, there are  patchy densities in both the right upper and right lower lobe. The  endotracheal tube has been removed. [place]-Beatrice catheter is again noted.     D:  07/13/2017  E:  07/13/2017       XR Chest 1 View [[id number]] Updated:  07/14/17 0542          Assessment    Principal Problem:    CAD (coronary artery disease), status post CABG ×4 on 07/12/2017.  Active Problems:    Transient cerebral ischemia    Diabetes mellitus, type 2, uncontrolled with A1c of 9.7    Hypertension    Hyperlipidemia    CKD (chronic kidney disease), stage III    Patient has underlying renal dysfunction.  However creatinine has increased to 2.1 today.  Also patient still on intravenous Cardene.    Plan   Discontinue chest tubes and pacer wires.  Leave in the intensive care unit today    Please note that portions of this note were completed with a voice recognition program. Efforts were made to edit the dictations, but occasionally words are mistranscribed.    Joey Mcmillan MD  07/14/17  7:22 AM

## 2017-07-14 NOTE — PROGRESS NOTES
[facility]       LOS: 2 days   Patient Care Team:  Provider Not In System as PCP - General        Subjective       Patient Denies:  Sob, palps.  Little sore.  Feels ok.  Up in chair      Vital Signs  Temp:  [98.3 °F (36.8 °C)-98.6 °F (37 °C)] 98.3 °F (36.8 °C)  Heart Rate:  [] 87  Resp:  [18-20] 20  BP: (103-160)/(52-77) 126/62  Arterial Line BP: (126)/(55) 126/55    Intake/Output Summary (Last 24 hours) at 07/14/17 0903  Last data filed at 07/14/17 0600   Gross per 24 hour   Intake             2470 ml   Output             1590 ml   Net              880 ml          Physical Exam:     General Appearance:    A&O, in no acute distress       Neck:   No adenopathy, supple, trachea midline, no thyromegaly, no JVD       Lungs:     Clear to auscultation anteriorly,respirations regular, even and                  unlabored    Heart:    Regular rhythm and normal rate, normal S1 and S2, no            murmur, no gallop, + rub, no click   Chest Wall:    No abnormalities observed   Abdomen:     Normal bowel sounds, no masses, no organomegaly, soft               Extremities:   No edema, no cyanosis, no redness   Pulses:   Pulses palpable and equal bilaterally     Results Review:     I reviewed the patient's new clinical results.      WBC WBC   Date/Time Value Ref Range Status   07/14/2017 0330 15.85 (H) 3.50 - 10.80 10*3/mm3 Final   07/13/2017 0334 12.81 (H) 3.50 - 10.80 10*3/mm3 Final   07/12/2017 1537 8.91 3.50 - 10.80 10*3/mm3 Final   07/12/2017 1200 7.60 3.50 - 10.80 10*3/mm3 Final   07/11/2017 1258 6.90 3.50 - 10.80 10*3/mm3 Final            HGB Hemoglobin   Date/Time Value Ref Range Status   07/14/2017 0330 9.9 (L) 13.1 - 17.5 g/dL Final   07/13/2017 0334 10.4 (L) 13.1 - 17.5 g/dL Final   07/12/2017 2359 10.4 (L) 13.1 - 17.5 g/dL Final   07/12/2017 1952 10.5 (L) 13.1 - 17.5 g/dL Final   07/12/2017 1537 9.8 (L) 13.1 - 17.5 g/dL Final   07/12/2017 1200 11.0  (L) 13.1 - 17.5 g/dL Final   07/12/2017 1038 9.2 (L) 12.0 - 17.0 g/dL Final   07/12/2017 1013 9.2 (L) 12.0 - 17.0 g/dL Final   07/12/2017 0951 8.8 (L) 12.0 - 17.0 g/dL Final   07/12/2017 0940 8.8 (L) 12.0 - 17.0 g/dL Final   07/12/2017 0931 8.5 (L) 12.0 - 17.0 g/dL Final   07/12/2017 0848 10.9 (L) 12.0 - 17.0 g/dL Final   07/12/2017 0723 11.9 (L) 12.0 - 17.0 g/dL Final   07/11/2017 1258 13.0 (L) 13.1 - 17.5 g/dL Final           HCT Hematocrit   Date/Time Value Ref Range Status   07/14/2017 0330 30.2 (L) 38.9 - 50.9 % Final   07/13/2017 0334 31.1 (L) 38.9 - 50.9 % Final   07/12/2017 2359 30.5 (L) 38.9 - 50.9 % Final   07/12/2017 1952 30.4 (L) 38.9 - 50.9 % Final   07/12/2017 1537 28.8 (L) 38.9 - 50.9 % Final   07/12/2017 1200 33.1 (L) 38.9 - 50.9 % Final   07/12/2017 1038 27 (L) 38 - 51 % Final   07/12/2017 1013 27 (L) 38 - 51 % Final   07/12/2017 0951 26 (L) 38 - 51 % Final   07/12/2017 0940 26 (L) 38 - 51 % Final   07/12/2017 0931 25 (L) 38 - 51 % Final   07/12/2017 0848 32 (L) 38 - 51 % Final   07/12/2017 0723 35 (L) 38 - 51 % Final   07/11/2017 1258 38.9 38.9 - 50.9 % Final            Platlets No results found for: LABPLAT  Sodium  Sodium   Date/Time Value Ref Range Status   07/14/2017 0330 134 132 - 146 mmol/L Final   07/13/2017 0334 137 132 - 146 mmol/L Final   07/12/2017 1537 136 132 - 146 mmol/L Final   07/12/2017 1200 136 132 - 146 mmol/L Final   07/11/2017 1258 135 132 - 146 mmol/L Final     Potassium  Potassium   Date/Time Value Ref Range Status   07/14/2017 0330 4.4 3.5 - 5.5 mmol/L Final   07/13/2017 0334 4.1 3.5 - 5.5 mmol/L Final   07/12/2017 2359 4.0 3.5 - 5.5 mmol/L Final   07/12/2017 1952 4.0 3.5 - 5.5 mmol/L Final   07/12/2017 1537 3.9 3.5 - 5.5 mmol/L Final   07/12/2017 1200 4.3 3.5 - 5.5 mmol/L Final   07/11/2017 1258 4.7 3.5 - 5.5 mmol/L Final     Chloride  Chloride   Date/Time Value Ref Range Status   07/14/2017 0330 103 99 - 109 mmol/L Final   07/13/2017 0334 105 99 - 109 mmol/L Final    07/12/2017 1537 105 99 - 109 mmol/L Final   07/12/2017 1200 106 99 - 109 mmol/L Final   07/11/2017 1258 101 99 - 109 mmol/L Final     BicarbonateNo results found for: PLASMABICARB    BUN BUN   Date/Time Value Ref Range Status   07/14/2017 0330 22 9 - 23 mg/dL Final   07/13/2017 0334 22 9 - 23 mg/dL Final   07/12/2017 1537 21 9 - 23 mg/dL Final   07/12/2017 1200 22 9 - 23 mg/dL Final   07/11/2017 1258 25 (H) 9 - 23 mg/dL Final      Creatinine Creatinine   Date/Time Value Ref Range Status   07/14/2017 0330 2.10 (H) 0.60 - 1.30 mg/dL Final   07/13/2017 0334 1.80 (H) 0.60 - 1.30 mg/dL Final   07/12/2017 1537 1.90 (H) 0.60 - 1.30 mg/dL Final   07/12/2017 1200 1.80 (H) 0.60 - 1.30 mg/dL Final   07/11/2017 1258 1.80 (H) 0.60 - 1.30 mg/dL Final      Calcium Calcium   Date/Time Value Ref Range Status   07/14/2017 0330 9.2 8.7 - 10.4 mg/dL Final   07/13/2017 0334 9.7 8.7 - 10.4 mg/dL Final   07/12/2017 1537 10.1 8.7 - 10.4 mg/dL Final   07/12/2017 1200 10.3 8.7 - 10.4 mg/dL Final   07/11/2017 1258 10.0 8.7 - 10.4 mg/dL Final      Mag Magnesium   Date/Time Value Ref Range Status   07/13/2017 0334 2.4 1.3 - 2.7 mg/dL Final   07/12/2017 1537 2.9 (H) 1.3 - 2.7 mg/dL Final   07/12/2017 1200 3.6 (H) 1.3 - 2.7 mg/dL Final   07/11/2017 1258 1.9 1.3 - 2.7 mg/dL Final           PT/INR:    Protime   Date Value Ref Range Status   07/13/2017 11.5 9.6 - 11.5 Seconds Final   07/12/2017 12.6 (H) 9.6 - 11.5 Seconds Final   07/11/2017 11.1 9.6 - 11.5 Seconds Final   /  INR   Date Value Ref Range Status   07/13/2017 1.05  Final   07/12/2017 1.15  Final   07/11/2017 1.02  Final     Troponin I     Lab Results   Component Value Date    TROPONINI 0.55 06/30/2014         amLODIPine 5 mg Oral Q24H   aspirin 325 mg Oral Daily   atorvastatin 40 mg Oral Nightly   famotidine 20 mg Oral BID   insulin detemir 30 Units Subcutaneous Q12H   losartan 50 mg Oral Q24H   metoprolol tartrate 25 mg Oral Q12H   pharmacy consult - MTM  Does not apply Daily    sennosides-docusate sodium 2 tablet Oral BID   tamsulosin 0.4 mg Oral Daily       dexmedetomidine 0.2-1.5 mcg/kg/hr Last Rate: Stopped (07/12/17 1600)   insulin regular infusion 1 unit/mL (CCU use) 0-50 Units/hr Last Rate: 3.2 Units/hr (07/14/17 0545)   niCARdipine 5-15 mg/hr Last Rate: 7.5 mg/hr (07/14/17 0543)   nitroglycerin 5-200 mcg/min Last Rate: Stopped (07/12/17 1501)   norepinephrine 0.02-0.3 mcg/kg/min    phenylephrine 0.5-3 mcg/kg/min        Assessment/Plan     Patient Active Problem List   Diagnosis Code   • Transient cerebral ischemia G45.9   • Diabetes mellitus, type 2, uncontrolled with A1c of 9.7 E11.9   • GERD (gastroesophageal reflux disease) K21.9   • Hypertension I10   • Lyme disease A69.20   • Hyperlipidemia E78.5   • Coronary artery disease I25.10   • CKD (chronic kidney disease), stage III N18.3   • Basilar artery stenosis I65.1   • CAD (coronary artery disease), status post CABG ×4 on 07/12/2017. I25.10     Progressing after CABG  Watch GFR  Wean JANAK Martin  07/14/17  9:03 AM

## 2017-07-14 NOTE — NURSING NOTE
Pt. Referred for Phase II Cardiac Rehab. Staff discussed benefits of exercise, program protocol, and educational material provided. Teach back verified.  Permission granted from patient for staff to fax referral information to outlying program at this time.  Staff to fax referral info to [place].

## 2017-07-14 NOTE — PLAN OF CARE
Problem: Patient Care Overview (Adult)  Goal: Plan of Care Review  Outcome: Ongoing (interventions implemented as appropriate)    07/14/17 1027   Coping/Psychosocial Response Interventions   Plan Of Care Reviewed With patient   Patient Care Overview   Progress progress towards functional goals is fair   Outcome Evaluation   Outcome Summary/Follow up Plan Pt still presents with bouts of confusion; ambulated 125ft with RW and modA for balance and RW control. Pt requires constant VC's to continue ambulating, to keep RW close and to increase step length.          Problem: Inpatient Physical Therapy  Goal: Bed Mobility Goal LTG- PT  Outcome: Ongoing (interventions implemented as appropriate)    07/13/17 1452 07/14/17 1027   Bed Mobility PT LTG   Bed Mobility PT LTG, Date Established 07/13/17 --    Bed Mobility PT LTG, Time to Achieve 2 wks --    Bed Mobility PT LTG, Activity Type supine to sit/sit to supine --    Bed Mobility PT LTG, [place] Level independent --    Bed Mobility PT LTG, Outcome --  goal ongoing       Goal: Transfer Training Goal 1 LTG- PT  Outcome: Ongoing (interventions implemented as appropriate)    07/13/17 1452 07/14/17 1027   Transfer Training PT LTG   Transfer Training PT LTG, Date Established 07/13/17 --    Transfer Training PT LTG, Time to Achieve 2 wks --    Transfer Training PT LTG, Activity Type sit to stand/stand to sit --    Transfer Training PT LTG, [place] Level independent --    Transfer Training PT LTG, Outcome --  goal ongoing       Goal: Gait Training Goal LTG- PT  Outcome: Ongoing (interventions implemented as appropriate)    07/13/17 1452 07/14/17 1027   Gait Training PT LTG   Gait Training Goal PT LTG, Date Established 07/13/17 --    Gait Training Goal PT LTG, Time to Achieve 2 wks --    Gait Training Goal PT LTG, [place] Level independent --    Gait Training Goal PT LTG, Distance to Achieve 400 --    Gait Training Goal PT LTG, Outcome --  goal ongoing

## 2017-07-14 NOTE — PLAN OF CARE
Problem: Patient Care Overview (Adult)  Goal: Plan of Care Review  Outcome: Ongoing (interventions implemented as appropriate)    07/14/17 1524   Coping/Psychosocial Response Interventions   Plan Of Care Reviewed With patient;spouse   Patient Care Overview   Progress progress towards functional goals is fair   Outcome Evaluation   Outcome Summary/Follow up Plan Pt still has intermittent confusion but will reorient. PT is [place] so will often not answer questions or misundertand what nurse is communicating. Cardene weaned off with admin of morning bp meds. Continue to mobilize and encourage pulmonart hygiene.          Problem: Cardiac Surgery (Adult)  Goal: Signs and Symptoms of Listed Potential Problems Will be Absent or Manageable (Cardiac Surgery)  Outcome: Ongoing (interventions implemented as appropriate)    07/14/17 1524   Cardiac Surgery   Problems Assessed (Cardiac Surgery) all   Problems Present (Cardiac Surgery) situational response         Problem: Fall Risk (Adult)  Goal: Identify Related Risk Factors and Signs and Symptoms  Outcome: Ongoing (interventions implemented as appropriate)    07/14/17 1524   Fall Risk   Fall Risk: Related Risk Factors confusion/agitation;fatigue/slow reaction;gait/mobility problems;environment unfamiliar   Fall Risk: Signs and Symptoms presence of risk factors       Goal: Absence of Falls  Outcome: Ongoing (interventions implemented as appropriate)    Problem: Pressure Ulcer Risk (Juan C Scale) (Adult,Obstetrics,Pediatric)  Goal: Skin Integrity  Outcome: Ongoing (interventions implemented as appropriate)    07/14/17 1524   Pressure Ulcer Risk (Juan C Scale) (Adult,Obstetrics,Pediatric)   Skin Integrity making progress toward outcome

## 2017-07-14 NOTE — PROGRESS NOTES
"Intensive Care Follow-up     Hospital:  LOS: 2 days   Mr. Jakob Boogie, 66 y.o. male is followed for:   CAD (coronary artery disease)        Subjective   Interval History:  The chart has been reviewed. The patient did require an increase in her oxygen flow of 6 L. She is not describing any shortness breath. She remains on a Cardene drip.    The patient's relevant past medical, surgical and social history were reviewed and updated in Epic as appropriate.        Objective     Infusions:    niCARdipine 5-15 mg/hr Last Rate: Stopped (07/14/17 1000)   nitroglycerin 5-200 mcg/min Last Rate: Stopped (07/12/17 1501)   norepinephrine 0.02-0.3 mcg/kg/min    phenylephrine 0.5-3 mcg/kg/min      Medications:    amLODIPine 5 mg Oral Q24H   aspirin 325 mg Oral Daily   atorvastatin 40 mg Oral Nightly   famotidine 20 mg Oral BID   insulin detemir 30 Units Subcutaneous Q12H   insulin lispro 0-7 Units Subcutaneous 4x Daily With Meals & Nightly   insulin lispro 5 Units Subcutaneous TID With Meals   losartan 50 mg Oral Q24H   metoprolol tartrate 25 mg Oral Q12H   pharmacy consult - MTM  Does not apply Daily   sennosides-docusate sodium 2 tablet Oral BID   tamsulosin 0.4 mg Oral Daily       Vital Sign Min/Max for last 24 hours  Temp  Min: 98.3 °F (36.8 °C)  Max: 98.9 °F (37.2 °C)   BP  Min: 95/56  Max: 158/72   Pulse  Min: 75  Max: 102   Resp  Min: 18  Max: 24   SpO2  Min: 90 %  Max: 96 %   Flow (L/min)  Min: 3  Max: 6       Input/Output for last 24 hour shift  07/13 0701 - 07/14 0700  In: 2470 [P.O.:600; I.V.:1820]  Out: 1590 [Urine:1290]      Objective:  General Appearance:  Comfortable, well-appearing and in no acute distress.    Vital signs: (most recent): Blood pressure 111/61, pulse 82, temperature 98.9 °F (37.2 °C), temperature source Axillary, resp. rate 20, height 68\" (172.7 cm), weight 248 lb (112 kg), SpO2 96 %.  No fever.    Output: Producing urine.    Lungs:  Normal respiratory rate and normal effort.  He is not in " respiratory distress.  Breath sounds clear to auscultation.  No wheezes, rales, rhonchi or decreased breath sounds.    Heart: Normal rate.  Regular rhythm.  S1 normal and S2 normal.  No murmur, gallop or friction rub.   Chest: Symmetric chest wall expansion.   Abdomen: Abdomen is soft and non-distended.  Bowel sounds are normal.   There is no abdominal tenderness.     Extremities: Normal range of motion.  There is deformity.  There is no effusion, local swelling or dependent edema.  (The patient is missing index finger on his left hand.)  Neurological: Patient is alert and oriented to person, place and time.  Normal strength.    Pupils:  Pupils are equal, round, and reactive to light.  Pupils are equal.   Skin:  Warm.  No rash or cyanosis.               Results from last 7 days  Lab Units 07/14/17  0330 07/13/17 0334 07/12/17 2359 07/12/17  1537   WBC 10*3/mm3 15.85* 12.81*  --   --  8.91   HEMOGLOBIN g/dL 9.9* 10.4* 10.4*  < > 9.8*   PLATELETS 10*3/mm3 143* 145*  --   --  138*   < > = values in this interval not displayed.    Results from last 7 days  Lab Units 07/14/17  0330 07/13/17  0334 07/12/17 2359 07/12/17  1537 07/12/17  1200   SODIUM mmol/L 134 137  --   --  136 136   POTASSIUM mmol/L 4.4 4.1 4.0  < > 3.9 4.3   CO2 mmol/L 25.0 24.0  --   --  28.0 23.0   BUN mg/dL 22 22  --   --  21 22   CREATININE mg/dL 2.10* 1.80*  --   --  1.90* 1.80*   MAGNESIUM mg/dL  --  2.4  --   --  2.9* 3.6*   PHOSPHORUS mg/dL  --  4.1  --   --  2.4 3.4   GLUCOSE mg/dL 134* 140*  --   --  175* 128*   < > = values in this interval not displayed.  Estimated Creatinine Clearance: 42.6 mL/min (by C-G formula based on Cr of 2.1).      Results from last 7 days  Lab Units 07/12/17  1640   PH, ARTERIAL pH units 7.345*   PCO2, ARTERIAL mm Hg 45.3   PO2 ART mm Hg 70.7*       Chest x-ray is reviewed and shows some left lower lobe atelectasis which has increased slightly compared with yesterday.    I reviewed the patient's results and  images.     Assessment/Plan   Impression      Principal Problem:    CAD (coronary artery disease), status post CABG ×4 on 07/12/2017.  Active Problems:    Transient cerebral ischemia    Diabetes mellitus, type 2, uncontrolled with A1c of 9.7    Hypertension    Hyperlipidemia    CKD (chronic kidney disease), stage III       Plan        We will continue to follow her renal function.  Continue with good pulmonary hygiene.  Wean oxygen.  We will increase insulin coverage.  Wean Cardene as tolerated.  Follow-up labs and orders have been placed.    Plan of care and goals reviewed with mulitdisciplinary team at daily rounds.   I discussed the patient's findings and my recommendations with patient and nursing staff       Richard Lama MD, [facility]  Pulmonary and Critical Care Medicine  07/14/17 12:05 PM

## 2017-07-14 NOTE — THERAPY TREATMENT NOTE
Acute Care - Physical Therapy Treatment Note  [facility]     Patient Name: Jakob Boogie  : 1950  MRN: [id number]  Today's Date: 2017  Onset of Illness/Injury or Date of Surgery Date: 17  Date of Referral to PT: 17  Referring Physician: MD Hipolito    Admit Date: 2017    Visit Dx:    ICD-10-CM ICD-9-CM   1. Impaired functional mobility, balance, gait, and endurance Z74.09 V49.89   2. Coronary artery disease involving native coronary artery of native heart with angina pectoris I25.119 414.01     413.9     Patient Active Problem List   Diagnosis   • Transient cerebral ischemia   • Diabetes mellitus, type 2, uncontrolled with A1c of 9.7   • GERD (gastroesophageal reflux disease)   • Hypertension   • Lyme disease   • Hyperlipidemia   • Coronary artery disease   • CKD (chronic kidney disease), stage III   • Basilar artery stenosis   • CAD (coronary artery disease), status post CABG ×4 on 2017.               Adult Rehabilitation Note       17 0912          Rehab Assessment/Intervention    Discipline (P)  physical therapist  -KR      Document Type (P)  therapy note (daily note)  -KR      Subjective Information (P)  agree to therapy;no complaints  -KR      Patient Effort, Rehab Treatment (P)  good  -KR      Symptoms Noted During/After Treatment (P)  shortness of breath  -KR      Precautions/Limitations (P)  fall precautions;cardiac precautions;oxygen therapy device and L/min;sternal precautions  -KR      Recorded by [KR] Adelina Mi, PT Student      Vital Signs    Pre Systolic BP Rehab (P)  128  -KR      Pre Treatment Diastolic BP (P)  64  -KR      Post Systolic BP Rehab (P)  133  -KR      Post Treatment Diastolic BP (P)  59  -KR      Pretreatment Heart Rate (beats/min) (P)  78  -KR      Posttreatment Heart Rate (beats/min) (P)  82  -KR      Pre SpO2 (%) (P)  94  -KR      O2 Delivery Pre Treatment (P)  supplemental O2  -KR      Post SpO2 (%) (P)  94  -KR      O2 Delivery  Post Treatment (P)  supplemental O2  -KR      Pre Patient Position (P)  Sitting  -KR      Intra Patient Position (P)  Standing  -KR      Post Patient Position (P)  Sitting  -KR      Recorded by [KR] Adelina Mi, PT Student      Pain Assessment    Pain Assessment (P)  0-10  -KR      Pain Score (P)  0  -KR      Post Pain Score (P)  0  -KR      Recorded by [KR] Adelina Mi, PT Student      Cognitive Assessment/Intervention    Current Cognitive/Communication Assessment (P)  impaired  -KR      Orientation Status (P)  oriented to;person   pt still has some confusion when following commands  -KR      Follows Commands/Answers Questions (P)  able to follow single-step instructions;50% of the time;needs cueing;needs increased time;needs repetition  -KR      Personal Safety (P)  moderate impairment;decreased awareness, need for assist;decreased awareness, need for safety;decreased insight to deficits  -KR      Personal Safety Interventions (P)  fall prevention program maintained;gait belt;nonskid shoes/slippers when out of bed  -KR      Recorded by [KR] Adelina Mi, PT Student      Bed Mobility, Assessment/Treatment    Bed Mobility, Comment (P)  UIC  -KR      Recorded by [KR] Adelina Mi, PT Student      Transfer Assessment/Treatment    Transfers, Sit-Stand [place] (P)  minimum assist (75% patient effort);2 person assist required;verbal cues required  -KR      Transfers, Stand-Sit [place] (P)  minimum assist (75% patient effort);2 person assist required;verbal cues required  -KR      Transfer, Safety Issues (P)  step length decreased;weight-shifting ability decreased  -KR      Transfer, Impairments (P)  strength decreased;impaired balance  -KR      Transfer, Comment (P)  VC's to maintain sternal precautions.  -KR      Recorded by [KR] Adelina Mi, PT Student      Gait Assessment/Treatment    Gait, [place] Level (P)  moderate assist (50% patient effort);1 person + 1 person to manage  equipment;verbal cues required  -KR      Gait, Assistive Device (P)  rolling walker   began amb w/o RW; used RW for 2nd half of amb  -KR      Gait, Distance (Feet) (P)  125  -KR      Gait, Gait Pattern Analysis (P)  swing-to gait   slow, shuffling steps  -KR      Gait, Gait Deviations (P)  thuy decreased;forward flexed posture;weight-shifting ability decreased;knee buckling;step length decreased  -KR      Gait, Safety Issues (P)  step length decreased;balance decreased during turns;weight-shifting ability decreased;loses balance backward  -KR      Gait, Impairments (P)  strength decreased;impaired balance;postural control impaired  -KR      Gait, Comment (P)  Pt gait not improved with HHA; continued use of RW. Pt demos slow, shuffling gait; requires constant VC's to continue walking. VC's to keep RW close and increase step length.  -KR      Recorded by [TANI] Adelina Mi PT Student      Motor Skills/Interventions    Additional Documentation (P)  Balance Skills Training (Group)  -KR      Recorded by [INDIA Mi PT Student      Balance Skills Training    Sitting-Level of Assistance (P)  Contact guard  -KR      Sitting-Balance Support (P)  Right upper extremity supported;Left upper extremity supported;Feet supported  -KR      Standing-Level of Assistance (P)  Minimum assistance  -KR      Static Standing Balance Support (P)  assistive device  -KR      Recorded by [TANI] Adelina Mi PT Student      Positioning and Restraints    Pre-Treatment Position (P)  sitting in chair/recliner  -KR      Post Treatment Position (P)  chair  -KR      In Chair (P)  reclined;call light within reach;encouraged to call for assist;legs elevated  -KR      Recorded by [TANI] Adelina Mi PT Student        User Key  (r) = Recorded By, (t) = Taken By, (c) = Cosigned By    Initials Name Effective Dates    TANI Mi PT Student 05/30/17 -                 IP PT Goals       07/14/17 1027 07/13/17 5577       Bed Mobility PT  LTG    Bed Mobility PT LTG, Date Established  07/13/17  -LS (r) KR (t) LS (c)     Bed Mobility PT LTG, Time to Achieve  2 wks  -LS (r) KR (t) LS (c)     Bed Mobility PT LTG, Activity Type  supine to sit/sit to supine  -LS (r) KR (t) LS (c)     Bed Mobility PT LTG, [place] Level  independent  -LS (r) KR (t) LS (c)     Bed Mobility PT LTG, Outcome (P)  goal ongoing  -KR      Transfer Training PT LTG    Transfer Training PT LTG, Date Established  07/13/17  -LS (r) KR (t) LS (c)     Transfer Training PT LTG, Time to Achieve  2 wks  -LS (r) KR (t) LS (c)     Transfer Training PT LTG, Activity Type  sit to stand/stand to sit  -LS (r) KR (t) LS (c)     Transfer Training PT LTG, [place] Level  independent  -LS (r) KR (t) LS (c)     Transfer Training PT LTG, Outcome (P)  goal ongoing  -KR      Gait Training PT LTG    Gait Training Goal PT LTG, Date Established  07/13/17  -LS (r) KR (t) LS (c)     Gait Training Goal PT LTG, Time to Achieve  2 wks  -LS (r) KR (t) LS (c)     Gait Training Goal PT LTG, [place] Level  independent  -LS (r) KR (t) LS (c)     Gait Training Goal PT LTG, Distance to Achieve  400  -LS (r) KR (t) LS (c)     Gait Training Goal PT LTG, Outcome (P)  goal ongoing  -KR        User Key  (r) = Recorded By, (t) = Taken By, (c) = Cosigned By    Initials Name Provider Type    LAUREL Lloyd, PT Physical Therapist    TANI Mi, PT Student PT Student          Physical Therapy Education     Title: PT OT SLP Therapies (Active)     Topic: Physical Therapy (Active)     Point: Mobility training (Active)    Learning Progress Summary    Learner Readiness Method Response Comment Documented by Status   Patient Acceptance E NR  KR 07/14/17 1027 Active    Acceptance E NR  MH 07/13/17 1801 Active    Acceptance E NR  KR 07/13/17 1452 Active               Point: Home exercise program (Active)    Learning Progress Summary    Learner Readiness Method Response Comment Documented by Status   Patient  Acceptance E NR   07/13/17 1801 Active               Point: Body mechanics (Active)    Learning Progress Summary    Learner Readiness Method Response Comment Documented by Status   Patient Acceptance E NR   07/14/17 1027 Active    Acceptance E NR   07/13/17 1801 Active    Acceptance E NR   07/13/17 1452 Active               Point: Precautions (Active)    Learning Progress Summary    Learner Readiness Method Response Comment Documented by Status   Patient Acceptance E NR   07/14/17 1027 Active    Acceptance E NR   07/13/17 1801 Active    Acceptance E NR   07/13/17 1452 Active                      User Key     Initials Effective Dates Name Provider Type Discipline     06/16/16 -  LAMONTE Wells, RN Registered Nurse Nurse     05/30/17 -  Adelina Mi, PT Student PT Student PT                    PT Recommendation and Plan  Anticipated Discharge Disposition: home with assist, home with home health  PT Frequency: daily  Plan of Care Review  Plan Of Care Reviewed With: (P) patient  Progress: (P) progress towards functional goals is fair  Outcome Summary/Follow up Plan: (P) Pt still presents with bouts of confusion; ambulated 125ft with RW and modA for balance and RW control. Pt requires constant VC's to continue ambulating, to keep RW close and to increase step length.           Outcome Measures       07/14/17 0912 07/13/17 1334       How much help from another person do you currently need...    Turning from your back to your side while in flat bed without using bedrails? (P)  3  -KR 3  -LS (r) KR (t) LS (c)     Moving from lying on back to sitting on the side of a flat bed without bedrails? (P)  3  -KR 3  -LS (r) KR (t) LS (c)     Moving to and from a bed to a chair (including a wheelchair)? (P)  3  -KR 3  -LS (r) KR (t) LS (c)     Standing up from a chair using your arms (e.g., wheelchair, bedside chair)? (P)  3  -KR 2  -LS (r) KR (t) LS (c)     Climbing 3-5 steps with a railing? (P)  2  -KR 2  -LS  (r) KR (t) LS (c)     To walk in hospital room? (P)  2  -KR 3  -LS (r) KR (t) LS (c)     AM-PAC 6 Clicks Score (P)  16  -KR 16  -LS (r) KR (t)     Functional Assessment    Outcome Measure Options (P)  AM-PAC 6 Clicks Basic Mobility (PT)  -KR AM-PAC 6 Clicks Basic Mobility (PT)  -LS (r) KR (t) LS (c)       User Key  (r) = Recorded By, (t) = Taken By, (c) = Cosigned By    Initials Name Provider Type    LS Abbie Lloyd, PT Physical Therapist    TANI Mi, PT Student PT Student           Time Calculation:         PT Charges       07/14/17 1030          Time Calculation    Start Time (P)  0912  -KR      PT Received On (P)  07/14/17  -KR      PT Goal Re-Cert Due Date (P)  07/23/17  -KR      Time Calculation- PT    Total Timed Code Minutes- PT (P)  25 minute(s)  -KR        User Key  (r) = Recorded By, (t) = Taken By, (c) = Cosigned By    Initials Name Provider Type    TANI Mi, PT Student PT Student          Therapy Charges for Today     Code Description Service Date Service Provider Modifiers Qty    84053003286 HC PT EVAL HIGH COMPLEXITY 4 7/13/2017 Adelina Mi, PT Student GP 1    58241734340 HC PT THER SUPP EA 15 MIN 7/13/2017 Adelina Mi, PT Student GP 2    01483746838 HC PT THER PROC EA 15 MIN 7/14/2017 Adelina Mi, PT Student GP 2    86177817572 HC PT THER SUPP EA 15 MIN 7/14/2017 Adelina Mi, PT Student GP 2          PT G-Codes  Outcome Measure Options: (P) AM-PAC 6 Clicks Basic Mobility (PT)    Lisa Mi PT Student  7/14/2017

## 2017-07-15 LAB
ABO + RH BLD: NORMAL
ABO + RH BLD: NORMAL
ANION GAP SERPL CALCULATED.3IONS-SCNC: 6 MMOL/L (ref 3–11)
BH BB BLOOD EXPIRATION DATE: NORMAL
BH BB BLOOD EXPIRATION DATE: NORMAL
BH BB BLOOD TYPE BARCODE: 6200
BH BB BLOOD TYPE BARCODE: 6200
BH BB DISPENSE STATUS: NORMAL
BH BB DISPENSE STATUS: NORMAL
BH BB PRODUCT CODE: NORMAL
BH BB PRODUCT CODE: NORMAL
BH BB UNIT NUMBER: NORMAL
BH BB UNIT NUMBER: NORMAL
BUN BLD-MCNC: 32 MG/DL (ref 9–23)
BUN/CREAT SERPL: 16 (ref 7–25)
CALCIUM SPEC-SCNC: 9.7 MG/DL (ref 8.7–10.4)
CHLORIDE SERPL-SCNC: 101 MMOL/L (ref 99–109)
CO2 SERPL-SCNC: 26 MMOL/L (ref 20–31)
CREAT BLD-MCNC: 2 MG/DL (ref 0.6–1.3)
CROSSMATCH INTERPRETATION: NORMAL
CROSSMATCH INTERPRETATION: NORMAL
DEPRECATED RDW RBC AUTO: 43 FL (ref 37–54)
ERYTHROCYTE [DISTWIDTH] IN BLOOD BY AUTOMATED COUNT: 12.6 % (ref 11.3–14.5)
GFR SERPL CREATININE-BSD FRML MDRD: 34 ML/MIN/1.73
GLUCOSE BLD-MCNC: 260 MG/DL (ref 70–100)
GLUCOSE BLDC GLUCOMTR-MCNC: 285 MG/DL (ref 70–130)
GLUCOSE BLDC GLUCOMTR-MCNC: 349 MG/DL (ref 70–130)
GLUCOSE BLDC GLUCOMTR-MCNC: 357 MG/DL (ref 70–130)
GLUCOSE BLDC GLUCOMTR-MCNC: 358 MG/DL (ref 70–130)
HCT VFR BLD AUTO: 28 % (ref 38.9–50.9)
HGB BLD-MCNC: 9.3 G/DL (ref 13.1–17.5)
MCH RBC QN AUTO: 31.1 PG (ref 27–31)
MCHC RBC AUTO-ENTMCNC: 33.2 G/DL (ref 32–36)
MCV RBC AUTO: 93.6 FL (ref 80–99)
PLATELET # BLD AUTO: 150 10*3/MM3 (ref 150–450)
PMV BLD AUTO: 11.1 FL (ref 6–12)
POTASSIUM BLD-SCNC: 4.9 MMOL/L (ref 3.5–5.5)
RBC # BLD AUTO: 2.99 10*6/MM3 (ref 4.2–5.76)
SODIUM BLD-SCNC: 133 MMOL/L (ref 132–146)
UNIT  ABO: NORMAL
UNIT  ABO: NORMAL
UNIT  RH: NORMAL
UNIT  RH: NORMAL
WBC NRBC COR # BLD: 11.43 10*3/MM3 (ref 3.5–10.8)

## 2017-07-15 PROCEDURE — 63710000001 INSULIN DETEMIR PER 5 UNITS: Performed by: INTERNAL MEDICINE

## 2017-07-15 PROCEDURE — 82962 GLUCOSE BLOOD TEST: CPT

## 2017-07-15 PROCEDURE — 99232 SBSQ HOSP IP/OBS MODERATE 35: CPT | Performed by: INTERNAL MEDICINE

## 2017-07-15 PROCEDURE — 80048 BASIC METABOLIC PNL TOTAL CA: CPT | Performed by: PHYSICIAN ASSISTANT

## 2017-07-15 PROCEDURE — 99024 POSTOP FOLLOW-UP VISIT: CPT | Performed by: THORACIC SURGERY (CARDIOTHORACIC VASCULAR SURGERY)

## 2017-07-15 PROCEDURE — 63710000001 INSULIN LISPRO (HUMAN) PER 5 UNITS: Performed by: INTERNAL MEDICINE

## 2017-07-15 PROCEDURE — 85027 COMPLETE CBC AUTOMATED: CPT | Performed by: PHYSICIAN ASSISTANT

## 2017-07-15 PROCEDURE — 97116 GAIT TRAINING THERAPY: CPT

## 2017-07-15 PROCEDURE — 99233 SBSQ HOSP IP/OBS HIGH 50: CPT | Performed by: INTERNAL MEDICINE

## 2017-07-15 RX ADMIN — INSULIN LISPRO 5 UNITS: 100 INJECTION, SOLUTION INTRAVENOUS; SUBCUTANEOUS at 07:58

## 2017-07-15 RX ADMIN — INSULIN DETEMIR 30 UNITS: 100 INJECTION, SOLUTION SUBCUTANEOUS at 08:03

## 2017-07-15 RX ADMIN — INSULIN DETEMIR 35 UNITS: 100 INJECTION, SOLUTION SUBCUTANEOUS at 20:30

## 2017-07-15 RX ADMIN — INSULIN LISPRO 6 UNITS: 100 INJECTION, SOLUTION INTRAVENOUS; SUBCUTANEOUS at 11:09

## 2017-07-15 RX ADMIN — ATORVASTATIN CALCIUM 40 MG: 40 TABLET, FILM COATED ORAL at 19:29

## 2017-07-15 RX ADMIN — METOPROLOL TARTRATE 25 MG: 25 TABLET ORAL at 19:30

## 2017-07-15 RX ADMIN — FAMOTIDINE 20 MG: 20 TABLET ORAL at 17:07

## 2017-07-15 RX ADMIN — HYDROCODONE BITARTRATE AND ACETAMINOPHEN 1 TABLET: 7.5; 325 TABLET ORAL at 16:23

## 2017-07-15 RX ADMIN — TAMSULOSIN HYDROCHLORIDE 0.4 MG: 0.4 CAPSULE ORAL at 08:03

## 2017-07-15 RX ADMIN — INSULIN LISPRO 10 UNITS: 100 INJECTION, SOLUTION INTRAVENOUS; SUBCUTANEOUS at 17:07

## 2017-07-15 RX ADMIN — INSULIN LISPRO 6 UNITS: 100 INJECTION, SOLUTION INTRAVENOUS; SUBCUTANEOUS at 21:20

## 2017-07-15 RX ADMIN — LOSARTAN POTASSIUM 50 MG: 50 TABLET ORAL at 08:03

## 2017-07-15 RX ADMIN — Medication 2 TABLET: at 08:00

## 2017-07-15 RX ADMIN — Medication 2 TABLET: at 17:07

## 2017-07-15 RX ADMIN — METOPROLOL TARTRATE 25 MG: 25 TABLET ORAL at 08:01

## 2017-07-15 RX ADMIN — FAMOTIDINE 20 MG: 20 TABLET ORAL at 08:00

## 2017-07-15 RX ADMIN — INSULIN LISPRO 5 UNITS: 100 INJECTION, SOLUTION INTRAVENOUS; SUBCUTANEOUS at 07:59

## 2017-07-15 RX ADMIN — INSULIN LISPRO 4 UNITS: 100 INJECTION, SOLUTION INTRAVENOUS; SUBCUTANEOUS at 17:08

## 2017-07-15 RX ADMIN — ASPIRIN 325 MG: 325 TABLET, DELAYED RELEASE ORAL at 08:00

## 2017-07-15 RX ADMIN — OXYCODONE AND ACETAMINOPHEN 2 TABLET: 5; 325 TABLET ORAL at 06:19

## 2017-07-15 RX ADMIN — DOCUSATE SODIUM 100 MG: 100 CAPSULE, LIQUID FILLED ORAL at 07:52

## 2017-07-15 RX ADMIN — AMLODIPINE BESYLATE 5 MG: 5 TABLET ORAL at 08:00

## 2017-07-15 RX ADMIN — INSULIN LISPRO 10 UNITS: 100 INJECTION, SOLUTION INTRAVENOUS; SUBCUTANEOUS at 11:10

## 2017-07-15 NOTE — THERAPY TREATMENT NOTE
Acute Care - Physical Therapy Treatment Note  [facility]     Patient Name: Jakob Boogie  : 1950  MRN: [id number]  Today's Date: 7/15/2017  Onset of Illness/Injury or Date of Surgery Date: 17  Date of Referral to PT: 17  Referring Physician: MD Hipolito    Admit Date: 2017    Visit Dx:    ICD-10-CM ICD-9-CM   1. Impaired functional mobility, balance, gait, and endurance Z74.09 V49.89   2. Coronary artery disease involving native coronary artery of native heart with angina pectoris I25.119 414.01     413.9     Patient Active Problem List   Diagnosis   • Transient cerebral ischemia   • Diabetes mellitus, type 2, uncontrolled with A1c of 9.7   • GERD (gastroesophageal reflux disease)   • Hypertension   • Lyme disease   • Hyperlipidemia   • Coronary artery disease   • CKD (chronic kidney disease), stage III   • Basilar artery stenosis   • CAD (coronary artery disease), status post CABG ×4 on 2017.               Adult Rehabilitation Note       07/15/17 1012 17 0912       Rehab Assessment/Intervention    Discipline physical therapist  - physical therapist  -TANI PRETTY JB2     Document Type  therapy note (daily note)  -TANI PRETTY JB2     Subjective Information  agree to therapy;no complaints  -TANI PRETTY JB2     Patient Effort, Rehab Treatment  good  -TANI PRETTY JB2     Symptoms Noted During/After Treatment  shortness of breath  -TANI PRETTY JB2     Precautions/Limitations fall precautions;cardiac precautions;sternal precautions  - fall precautions;cardiac precautions;oxygen therapy device and L/min;sternal precautions  -TANI PRETTY JB2     Recorded by [] Jordana Fleming, PT [TANI PRETTYJB2] Bebe Wong, PT (r) Adelina Mi PT Student (t) Bebe Wong PT (c)     Vital Signs    Pre Systolic BP Rehab 121  -  -TANI PRETTY,JB2     Pre Treatment Diastolic BP 69  -SJ 64  -SUKHWINDER,TANI,JB2     Post Systolic BP Rehab  133  -TANI PRETTY,JB2     Post Treatment Diastolic BP  59  -SUKHWINDER,TANI,JB2     Pretreatment Heart Rate  (beats/min) 81  -SJ 78  -SUKHWINDER,KR,JB2     Posttreatment Heart Rate (beats/min)  82  -SUKHWINDER,KR,JB2     Pre SpO2 (%) 99  -SJ 94  -SUKHWINDER,KR,JB2     O2 Delivery Pre Treatment supplemental O2  -SJ supplemental O2  -SUKHWINDER,KR,JB2     Post SpO2 (%)  94  -SUKHWINDER,KR,JB2     O2 Delivery Post Treatment  supplemental O2  -SUKHWINDER,KR,JB2     Pre Patient Position Sitting  -SJ Sitting  -SUKHWINDER,KR,JB2     Intra Patient Position Standing  -SJ Standing  -SUKHWINDER,KR,JB2     Post Patient Position Sitting  -SJ Sitting  -SUKHWINDER,KR,JB2     Recorded by [SJ] Jordana Fleming PT [SUKHWINDER,KR,JB2] Bebe Wong PT (r) Adelina Mi, PT Student (t) Bebe Wong PT (c)     Pain Assessment    Pain Assessment No/denies pain  - 0-10  -SUKHWINDER,KR,JB2     Pain Score 0  -SJ 0  -SUKHWINDER,KR,JB2     Post Pain Score 0  -SJ 0  -SUKHWINDER,KR,JB2     Recorded by [] Jordana Fleming PT [SUKHWINDER,KR,JB2] Bebe Wong PT (r) Adelina Mi, PT Student (t) Bebe Wong PT (c)     Cognitive Assessment/Intervention    Current Cognitive/Communication Assessment functional  -SJ impaired  -SUKHWINDER,KR,JB2     Orientation Status oriented x 4  -SJ oriented to;person   pt still has some confusion when following commands  -SUKHWINDER,KR,JB2     Follows Commands/Answers Questions 100% of the time;able to follow single-step instructions;needs cueing;needs increased time  - able to follow single-step instructions;50% of the time;needs cueing;needs increased time;needs repetition  -SUKHWINDER,KR,JB2     Personal Safety mild impairment;decreased awareness, need for assist;decreased awareness, need for safety  - moderate impairment;decreased awareness, need for assist;decreased awareness, need for safety;decreased insight to deficits  -SUKHWINDER,KR,JB2     Personal Safety Interventions fall prevention program maintained;gait belt;muscle strengthening facilitated;nonskid shoes/slippers when out of bed  - fall prevention program maintained;gait belt;nonskid shoes/slippers when out of bed  -SUKHWINDER,KR,JB2     Recorded by [] Jordana  Lonnie, PT [SUKHWINDER,KR,JB2] Bebe Wong, PT (r) Adelina Mi, PT Student (t) Bebe Wong PT (c)     Bed Mobility, Assessment/Treatment    Bed Mobility, Comment UIC  -SJ UIC  -SUKHWINDER,KR,JB2     Recorded by [SJ] Jordana Fleming PT [SUKHWINDER,KR,JB2] Bebe Wong PT (r) Adelina Mi, PT Student (t) Bebe Wong, PT (c)     Transfer Assessment/Treatment    Transfers, Sit-Stand [place] contact guard assist;verbal cues required  -SJ minimum assist (75% patient effort);2 person assist required;verbal cues required  -SUKHWINDER,KR,JB2     Transfers, Stand-Sit [place] contact guard assist;verbal cues required  -SJ minimum assist (75% patient effort);2 person assist required;verbal cues required  -SUKHWINDER,KR,JB2     Transfers, Sit-Stand-Sit, Assist Device rolling walker  -SJ      Transfer, Safety Issues weight-shifting ability decreased  -SJ step length decreased;weight-shifting ability decreased  -SUKHWINDER,KR,JB2     Transfer, Impairments strength decreased  -SJ strength decreased;impaired balance  -SUKHWINDER,KR,JB2     Transfer, Comment cues for sternal precautions  -SJ VC's to maintain sternal precautions.  -SUKHWINDER,KR,JB2     Recorded by [SJ] Jordana Fleming PT [SKUHWINDER,KR,JB2] Bebe Wong, PT (r) Adelina Mi, PT Student (t) Bebe Wong PT (c)     Gait Assessment/Treatment    Gait, [place] Level minimum assist (75% patient effort);verbal cues required  -SJ moderate assist (50% patient effort);1 person + 1 person to manage equipment;verbal cues required  -SUKHWINDER,KR,JB2     Gait, Assistive Device rolling walker  -SJ rolling walker   began amb w/o RW; used RW for 2nd half of amb  -SUKHWINDER,KR,JB2     Gait, Distance (Feet) 150  -  -SUKHWINDER,KR,JB2     Gait, Gait Pattern Analysis swing-to gait  -SJ swing-to gait   slow, shuffling steps  -SUKHWINDER,KR,JB2     Gait, Gait Deviations thuy decreased;decreased heel strike;double stance time increased;forward flexed posture;narrow base  -SJ thuy decreased;forward flexed  posture;weight-shifting ability decreased;knee buckling;step length decreased  -SUKHWINDER,KR,JB2     Gait, Safety Issues step length decreased;supplemental O2;weight-shifting ability decreased;balance decreased during turns  -SJ step length decreased;balance decreased during turns;weight-shifting ability decreased;loses balance backward  -SUKHWINDER,KR,JB2     Gait, Impairments strength decreased;impaired balance  -SJ strength decreased;impaired balance;postural control impaired  -SUKHWINDER,KR,JB2     Gait, Comment Slow shuffling gait, req max cues to increase step length  -SJ Pt gait not improved with HHA; continued use of RW. Pt demos slow, shuffling gait; requires constant VC's to continue walking. VC's to keep RW close and increase step length.  -SUKHWINDER,KR,JB2     Recorded by [SJ] Jordana Fleming PT [SUKHWINDER,KR,JB2] Bebe Wong PT (r) Adelina Mi, PT Student (t) Bebe Wong, PT (c)     Motor Skills/Interventions    Additional Documentation  Balance Skills Training (Group)  -SUKHWINDER,KR,JB2     Recorded by  [SUKHWINDER,KR,JB2] Bebe Wong PT (r) Adelina Mi, PT Student (t) Bebe Wong PT (c)     Balance Skills Training    Sitting-Level of Assistance Close supervision  - Contact guard  -SUKHWINDERKR,JB2     Sitting-Balance Support Right upper extremity supported;Left upper extremity supported;Feet supported  - Right upper extremity supported;Left upper extremity supported;Feet supported  -SUKHWINDER,KR,JB2     Standing-Level of Assistance Contact guard  -SJ Minimum assistance  -SUKHWINDER,KR,JB2     Static Standing Balance Support assistive device  - assistive device  -SUKHWINDER,KR,JB2     Gait Balance-Level of Assistance Minimum assistance  -SJ      Gait Balance Support assistive device  -      Recorded by [SJ] Jordana Fleming PT [SUKHWINDER,KR,JB2] Bebe Wong PT (r) Adelina Mi PT Student (t) Bebe Wong, PT (c)     Positioning and Restraints    Pre-Treatment Position sitting in chair/recliner  -SJ sitting in chair/recliner   -SUKHWINDER,KR,JB2     Post Treatment Position chair  -SJ chair  -SUKHWINDER,KR,JB2     In Chair reclined;notified nsg;call light within reach;encouraged to call for assist;with nsg;waffle cushion;heels elevated  -SJ reclined;call light within reach;encouraged to call for assist;legs elevated  -SUKHWINDER,KR,JB2     Recorded by [SJ] Jordana Fleming, PT [SUKHWINDER,KR,JB2] Bebe Wong, PT (r) Adelina iM, PT Student (t) Bebe Wong, PT (c)       User Key  (r) = Recorded By, (t) = Taken By, (c) = Cosigned By    Initials Name Effective Dates    SUKHWINDER Bebe Wong, PT 06/19/15 -     LACHO Fleming, PT 06/19/15 -     TANI Mi, PT Student 05/30/17 -                 IP PT Goals       07/14/17 1027 07/13/17 1452       Bed Mobility PT LTG    Bed Mobility PT LTG, Date Established  07/13/17  -LS (r) KR (t) LS (c)     Bed Mobility PT LTG, Time to Achieve  2 wks  -LS (r) KR (t) LS (c)     Bed Mobility PT LTG, Activity Type  supine to sit/sit to supine  -LS (r) KR (t) LS (c)     Bed Mobility PT LTG, [place] Level  independent  -LS (r) KR (t) LS (c)     Bed Mobility PT LTG, Outcome goal ongoing  -SUKHWINDER (r) KR (t) SUKHWINDER (c)      Transfer Training PT LTG    Transfer Training PT LTG, Date Established  07/13/17  -LS (r) KR (t) LS (c)     Transfer Training PT LTG, Time to Achieve  2 wks  -LS (r) KR (t) LS (c)     Transfer Training PT LTG, Activity Type  sit to stand/stand to sit  -LS (r) KR (t) LS (c)     Transfer Training PT LTG, [place] Level  independent  -LS (r) KR (t) LS (c)     Transfer Training PT LTG, Outcome goal ongoing  -SUKHWINDER (r) KR (t) SUKHWINDER (c)      Gait Training PT LTG    Gait Training Goal PT LTG, Date Established  07/13/17  -LS (r) KR (t) LS (c)     Gait Training Goal PT LTG, Time to Achieve  2 wks  -LS (r) KR (t) LS (c)     Gait Training Goal PT LTG, [place] Level  independent  -LS (r) KR (t) LS (c)     Gait Training Goal PT LTG, Distance to Achieve  400  -LS (r) KR (t) LS (c)     Gait Training Goal PT LTG, Outcome  goal ongoing  -SUKHWINDER (r) KR (t) SUKHWINDER (c)        User Key  (r) = Recorded By, (t) = Taken By, (c) = Cosigned By    Initials Name Provider Type    SUKHWINDER Wong, PT Physical Therapist    LAUREL Lloyd, PT Physical Therapist    TANI Mi, PT Student PT Student          Physical Therapy Education     Title: PT OT SLP Therapies (Active)     Topic: Physical Therapy (Active)     Point: Mobility training (Active)    Learning Progress Summary    Learner Readiness Method Response Comment Documented by Status   Patient Acceptance E,D NR   07/15/17 1148 Active    Acceptance E NR   07/14/17 1027 Active    Acceptance E NR   07/13/17 1801 Active    Acceptance E NR   07/13/17 1452 Active               Point: Home exercise program (Active)    Learning Progress Summary    Learner Readiness Method Response Comment Documented by Status   Patient Acceptance E,D NR   07/15/17 1148 Active    Acceptance E NR   07/13/17 1801 Active               Point: Body mechanics (Active)    Learning Progress Summary    Learner Readiness Method Response Comment Documented by Status   Patient Acceptance E,D NR   07/15/17 1148 Active    Acceptance E NR   07/14/17 1027 Active    Acceptance E NR   07/13/17 1801 Active    Acceptance E NR   07/13/17 1452 Active               Point: Precautions (Active)    Learning Progress Summary    Learner Readiness Method Response Comment Documented by Status   Patient Acceptance E,D NR   07/15/17 1148 Active    Acceptance E NR   07/14/17 1027 Active    Acceptance E NR   07/13/17 1801 Active    Acceptance E NR   07/13/17 1452 Active                      User Key     Initials Effective Dates Name Provider Type Discipline     06/19/15 -  Jordana Fleming, PT Physical Therapist PT     06/16/16 -  LAMONTE Wells, RN Registered Nurse Nurse     05/30/17 -  Adelina Mi, PT Student PT Student PT                    PT Recommendation and Plan  Anticipated Discharge Disposition: home  with assist, home with home health  PT Frequency: daily  Plan of Care Review  Plan Of Care Reviewed With: patient  Progress: progress toward functional goals as expected  Outcome Summary/Follow up Plan: Pt participated well with PT, followed all commands appropriately. Pt amb 150ft with RW with Berna, dem slow, shuffling gait.           Outcome Measures       07/15/17 1012 07/14/17 0912 07/13/17 1334    How much help from another person do you currently need...    Turning from your back to your side while in flat bed without using bedrails? 3  -SJ 3  -SUKHWINDER (r) KR (t) SUKHWINDER (c) 3  -LS (r) KR (t) LS (c)    Moving from lying on back to sitting on the side of a flat bed without bedrails? 3  -SJ 3  -SUKHWINDER (r) KR (t) SUKHWINDER (c) 3  -LS (r) KR (t) LS (c)    Moving to and from a bed to a chair (including a wheelchair)? 3  -SJ 3  -SUKHWINDER (r) KR (t) SUKHWINDER (c) 3  -LS (r) KR (t) LS (c)    Standing up from a chair using your arms (e.g., wheelchair, bedside chair)? 3  -SJ 3  -SUKHWINDER (r) KR (t) SUKHWINDER (c) 2  -LS (r) KR (t) LS (c)    Climbing 3-5 steps with a railing? 2  -SJ 2  -SUKHWINDER (r) KR (t) SUKHWINDER (c) 2  -LS (r) KR (t) LS (c)    To walk in hospital room? 3  -SJ 2  -SUKHWINDER (r) KR (t) SUKHWINDER (c) 3  -LS (r) KR (t) LS (c)    AM-PAC 6 Clicks Score 17  -SJ 16  -SUKHWINDER (r) KR (t) 16  -LS (r) KR (t)    Functional Assessment    Outcome Measure Options AM-PAC 6 Clicks Basic Mobility (PT)  -SJ AM-PAC 6 Clicks Basic Mobility (PT)  -SUKHWINDER (r) KR (t) SUKHWINDER (c) AM-PAC 6 Clicks Basic Mobility (PT)  -LS (r) KR (t) LS (c)      User Key  (r) = Recorded By, (t) = Taken By, (c) = Cosigned By    Initials Name Provider Type    SUKHWINDER Wong, PT Physical Therapist    SJ Jordana Fleming, PT Physical Therapist    LS Abbie Lloyd, PT Physical Therapist    KR Adelina Mi, PT Student PT Student           Time Calculation:         PT Charges       07/15/17 1149          Time Calculation    Start Time 1012  -SJ      PT Received On 07/15/17  -      PT Goal Re-Cert Due Date 07/23/17  -      Time  Calculation- PT    Total Timed Code Minutes- PT 18 minute(s)  -        User Key  (r) = Recorded By, (t) = Taken By, (c) = Cosigned By    Initials Name Provider Type     Jordana Fleming PT Physical Therapist          Therapy Charges for Today     Code Description Service Date Service Provider Modifiers Qty    09210832109 HC GAIT TRAINING EA 15 MIN 7/15/2017 Jordana Fleming, PT GP 1    [id number]  PT THER SUPP EA 15 MIN 7/15/2017 Jordana Fleming, PT GP 2          PT G-Codes  Outcome Measure Options: AM-PAC 6 Clicks Basic Mobility (PT)    Jordana Fleming PT  7/15/2017

## 2017-07-15 NOTE — PROGRESS NOTES
"[facility] Progress Note     LOS: 3 days   Patient Care Team:  Provider Not In System as PCP - General  PCP:  Provider Not In System    Chief Complaint:  Fu cabg 7/12    SUBJECTIVE: awake bedside chair  Wife in room  Doing incentive ricci      Review of Systems:   All systems have been reviewed and are negative with the exception of those mentioned above.      OBJECTIVE:    Vital Sign Min/Max for last 24 hours  Temp  Min: 98.1 °F (36.7 °C)  Max: 98.6 °F (37 °C)   BP  Min: 97/52  Max: 159/78   Pulse  Min: 64  Max: 98   Resp  Min: 16  Max: 22   SpO2  Min: 92 %  Max: 98 %   Flow (L/min)  Min: 4  Max: 6   No Data Recorded     Flowsheet Rows         First Filed Value    Admission Height  68\" (172.7 cm) Documented at 07/12/2017 0559    Admission Weight  248 lb (112 kg) Documented at 07/12/2017 0559          Telemetry: sr      Intake/Output Summary (Last 24 hours) at 07/15/17 0816  Last data filed at 07/15/17 0600   Gross per 24 hour   Intake              987 ml   Output             1340 ml   Net             -353 ml     Intake & Output (last 3 days)       07/12 0701 - 07/13 0700 07/13 0701 - 07/14 0700 07/14 0701 - 07/15 0700 07/15 0701 - 07/16 0700    P.O.  600 600     I.V. (mL/kg) 2955 (26.3) 1820 (16.2) 507 (4.5)     Blood 195       IV Piggyback 1500 50      Total Intake(mL/kg) 4650 (41.3) 2470 (22) 1107 (9.8)     Urine (mL/kg/hr) 5245 (1.9) 1290 (0.5) 1500 (0.6)     Other 522 (0.2) 300 (0.1)      Total Output 5767 1590 1500      Net -1117 +880 -393                     Physical Exam:  Physical Exam   Constitutional: He appears well-developed and well-nourished.   Neck: Normal range of motion. Neck supple. No hepatojugular reflux and no JVD present. Carotid bruit is not present. No tracheal deviation present. No thyromegaly present.   Cardiovascular: Normal rate, regular rhythm, S1 normal, S2 normal, intact distal pulses and normal pulses.  PMI is not displaced.  Exam reveals no " gallop, no distant heart sounds, no friction rub, no midsystolic click and no opening snap.    No murmur heard.  Pulses:       Radial pulses are 2+ on the right side, and 2+ on the left side.        Dorsalis pedis pulses are 2+ on the right side, and 2+ on the left side.        Posterior tibial pulses are 2+ on the right side, and 2+ on the left side.   Pulmonary/Chest: Effort normal and breath sounds normal. He has no wheezes. He has no rales.   Abdominal: Soft. Bowel sounds are normal. He exhibits no mass. There is no tenderness. There is no guarding.        LABS/DIAGNOSTIC DATA:    Results from last 7 days  Lab Units 07/15/17  0247 07/14/17  0330 07/13/17  0334   WBC 10*3/mm3 11.43* 15.85* 12.81*   HEMOGLOBIN g/dL 9.3* 9.9* 10.4*   HEMATOCRIT % 28.0* 30.2* 31.1*   PLATELETS 10*3/mm3 150 143* 145*     No results found for: TROPONINT    Results from last 7 days  Lab Units 07/13/17  0334 07/12/17  1200 07/11/17  1258   INR  1.05 1.15 1.02   APTT seconds  --  <24.0* 26.1       Results from last 7 days  Lab Units 07/15/17  0247 07/14/17  0330 07/13/17  0334  07/11/17  1258   SODIUM mmol/L 133 134 137  < > 135   POTASSIUM mmol/L 4.9 4.4 4.1  < > 4.7   CHLORIDE mmol/L 101 103 105  < > 101   CO2 mmol/L 26.0 25.0 24.0  < > 34.0*   BUN mg/dL 32* 22 22  < > 25*   CREATININE mg/dL 2.00* 2.10* 1.80*  < > 1.80*   CALCIUM mg/dL 9.7 9.2 9.7  < > 10.0   BILIRUBIN mg/dL  --   --   --   --  0.4   ALK PHOS U/L  --   --   --   --  64   ALT (SGPT) U/L  --   --   --   --  18   AST (SGOT) U/L  --   --   --   --  17   GLUCOSE mg/dL 260* 134* 140*  < > 251*   < > = values in this interval not displayed.    Results from last 7 days  Lab Units 07/11/17  1258   HEMOGLOBIN A1C % 9.70*                   Medication Review:     amLODIPine 5 mg Oral Q24H   aspirin 325 mg Oral Daily   atorvastatin 40 mg Oral Nightly   famotidine 20 mg Oral BID   insulin detemir 30 Units Subcutaneous Q12H   insulin lispro 0-7 Units Subcutaneous 4x Daily With Meals  & Nightly   insulin lispro 5 Units Subcutaneous TID With Meals   losartan 50 mg Oral Q24H   metoprolol tartrate 25 mg Oral Q12H   pharmacy consult - MTM  Does not apply Daily   sennosides-docusate sodium 2 tablet Oral BID   tamsulosin 0.4 mg Oral Daily        niCARdipine 5-15 mg/hr Last Rate: Stopped (07/14/17 1000)   nitroglycerin 5-200 mcg/min Last Rate: Stopped (07/12/17 1501)   norepinephrine 0.02-0.3 mcg/kg/min    phenylephrine 0.5-3 mcg/kg/min         Principal Problem:    CAD (coronary artery disease), status post CABG ×4 on 07/12/2017.  Active Problems:    Transient cerebral ischemia    Diabetes mellitus, type 2, uncontrolled with A1c of 9.7    Hypertension    Hyperlipidemia    CKD (chronic kidney disease), stage III      ASSESSMENT/PLAN:  Cad post cabg 7/12  preop ef 60%    ckd 1.8-2.0    Dm            Saul Reis MD   07/15/17  8:16 AM

## 2017-07-15 NOTE — PLAN OF CARE
Problem: Patient Care Overview (Adult)  Goal: Plan of Care Review  Outcome: Ongoing (interventions implemented as appropriate)    07/15/17 1147   Coping/Psychosocial Response Interventions   Plan Of Care Reviewed With patient   Patient Care Overview   Progress progress toward functional goals as expected   Outcome Evaluation   Outcome Summary/Follow up Plan Pt participated well with PT, followed all commands appropriately. Pt amb 150ft with RW with Berna, dem slow, shuffling gait.          Problem: Inpatient Physical Therapy  Goal: Bed Mobility Goal LTG- PT  Outcome: Ongoing (interventions implemented as appropriate)    07/13/17 1452 07/14/17 1027   Bed Mobility PT LTG   Bed Mobility PT LTG, Date Established 07/13/17 --    Bed Mobility PT LTG, Time to Achieve 2 wks --    Bed Mobility PT LTG, Activity Type supine to sit/sit to supine --    Bed Mobility PT LTG, [place] Level independent --    Bed Mobility PT LTG, Outcome --  goal ongoing       Goal: Transfer Training Goal 1 LTG- PT  Outcome: Ongoing (interventions implemented as appropriate)    07/13/17 1452 07/14/17 1027   Transfer Training PT LTG   Transfer Training PT LTG, Date Established 07/13/17 --    Transfer Training PT LTG, Time to Achieve 2 wks --    Transfer Training PT LTG, Activity Type sit to stand/stand to sit --    Transfer Training PT LTG, [place] Level independent --    Transfer Training PT LTG, Outcome --  goal ongoing       Goal: Gait Training Goal LTG- PT  Outcome: Ongoing (interventions implemented as appropriate)    07/13/17 1452 07/14/17 1027   Gait Training PT LTG   Gait Training Goal PT LTG, Date Established 07/13/17 --    Gait Training Goal PT LTG, Time to Achieve 2 wks --    Gait Training Goal PT LTG, [place] Level independent --    Gait Training Goal PT LTG, Distance to Achieve 400 --    Gait Training Goal PT LTG, Outcome --  goal ongoing

## 2017-07-15 NOTE — PROGRESS NOTES
"Intensive Care Follow-up     Hospital:  LOS: 3 days   Mr. Jakob Boogie, 67 y.o. male is followed for:   CAD (coronary artery disease)        Subjective   Interval History:  The chart has been reviewed. The patient has done well overnight. He remained afebrile. He is been working well with physical therapy.    The patient's relevant past medical, surgical and social history were reviewed and updated in Epic as appropriate.        Objective     Infusions:     Medications:    amLODIPine 5 mg Oral Q24H   aspirin 325 mg Oral Daily   atorvastatin 40 mg Oral Nightly   famotidine 20 mg Oral BID   insulin detemir 35 Units Subcutaneous Q12H   insulin lispro 0-7 Units Subcutaneous 4x Daily With Meals & Nightly   insulin lispro 10 Units Subcutaneous TID With Meals   losartan 50 mg Oral Q24H   metoprolol tartrate 25 mg Oral Q12H   pharmacy consult - MTM  Does not apply Daily   sennosides-docusate sodium 2 tablet Oral BID   tamsulosin 0.4 mg Oral Daily       Vital Sign Min/Max for last 24 hours  Temp  Min: 98.1 °F (36.7 °C)  Max: 99 °F (37.2 °C)   BP  Min: 99/58  Max: 159/78   Pulse  Min: 64  Max: 98   Resp  Min: 16  Max: 22   SpO2  Min: 92 %  Max: 99 %   Flow (L/min)  Min: 4  Max: 6       Input/Output for last 24 hour shift  07/14 0701 - 07/15 0700  In: 1107 [P.O.:600; I.V.:507]  Out: 1500 [Urine:1500]      Objective:  General Appearance:  Comfortable, well-appearing, in no acute distress and not in pain.    Vital signs: (most recent): Blood pressure 121/69, pulse 75, temperature 99 °F (37.2 °C), temperature source Axillary, resp. rate 18, height 68\" (172.7 cm), weight 248 lb (112 kg), SpO2 96 %.  No fever.    Output: Producing urine.    Lungs:  Normal respiratory rate and normal effort.  He is not in respiratory distress.  There are decreased breath sounds.  No wheezes, rales or rhonchi.    Heart: Normal rate.  Regular rhythm.  S1 normal and S2 normal.  No murmur, gallop or friction rub.   Chest: Symmetric chest wall " expansion.   Abdomen: Abdomen is soft and non-distended.  Bowel sounds are normal.   There is no abdominal tenderness.     Extremities: Normal range of motion.  There is deformity.  There is no effusion, local swelling or dependent edema.  (The patient is missing index finger on his left hand.)  Neurological: Patient is alert and oriented to person, place and time.  Normal strength.    Pupils:  Pupils are equal, round, and reactive to light.  Pupils are equal.   Skin:  Warm.  No rash or cyanosis.               Results from last 7 days  Lab Units 07/15/17  0247 07/14/17  0330 07/13/17  0334   WBC 10*3/mm3 11.43* 15.85* 12.81*   HEMOGLOBIN g/dL 9.3* 9.9* 10.4*   PLATELETS 10*3/mm3 150 143* 145*       Results from last 7 days  Lab Units 07/15/17  0247 07/14/17  0330 07/13/17  0334  07/12/17  1537 07/12/17  1200   SODIUM mmol/L 133 134 137  --  136 136   POTASSIUM mmol/L 4.9 4.4 4.1  < > 3.9 4.3   CO2 mmol/L 26.0 25.0 24.0  --  28.0 23.0   BUN mg/dL 32* 22 22  --  21 22   CREATININE mg/dL 2.00* 2.10* 1.80*  --  1.90* 1.80*   MAGNESIUM mg/dL  --   --  2.4  --  2.9* 3.6*   PHOSPHORUS mg/dL  --   --  4.1  --  2.4 3.4   GLUCOSE mg/dL 260* 134* 140*  --  175* 128*   < > = values in this interval not displayed.  Estimated Creatinine Clearance: 44.1 mL/min (by C-G formula based on Cr of 2).      Results from last 7 days  Lab Units 07/12/17  1640   PH, ARTERIAL pH units 7.345*   PCO2, ARTERIAL mm Hg 45.3   PO2 ART mm Hg 70.7*         I reviewed the patient's results and images.     Assessment/Plan   Impression      Principal Problem:    CAD (coronary artery disease), status post CABG ×4 on 07/12/2017.  Active Problems:    Transient cerebral ischemia    Diabetes mellitus, type 2, uncontrolled with A1c of 9.7    Hypertension    Hyperlipidemia    CKD (chronic kidney disease), stage III       Plan        Increased Levemir as well as the prandial lispro.  Continue with physical therapy.  Pulmonary hygiene has been  encouraged.  Patient will transfer to the floor today and I will last the hospitalist service to follow him for his diabetes. I suspect that he will need further adjustment of his insulin.  Follow-up labs and orders are placed.    Plan of care and goals reviewed with mulitdisciplinary team at daily rounds.   I discussed the patient's findings and my recommendations with patient and nursing staff         Richard Lama MD, [facility]  Pulmonary and Critical Care Medicine  07/15/17 10:48 AM

## 2017-07-15 NOTE — PROGRESS NOTES
POD#3 s/p CABGx4    Subjective:  Up to chair.  No complaints.  Denies angina denies shortness of breath.  Wounds okay.  Vital signs stable hemodynamics normal.        Objective:    Vital Signs  Temp:  [98.1 °F (36.7 °C)-99 °F (37.2 °C)] 99 °F (37.2 °C)  Heart Rate:  [64-98] 74  Resp:  [16-22] 20  BP: ()/(52-78) 125/69    Physical Exam:   General Appearance: alert, appears stated age and cooperative   Lungs: clear to auscultation    Heart: regular rhythm & normal rate, normal S1, S2,no murmur    Skin:warm and dry         Diagnosis:  Postoperative coronary artery bypass grafting      Plan   Telemetry    Ruben Vasques MD  07/15/17  9:20 AM

## 2017-07-15 NOTE — PLAN OF CARE
Problem: Patient Care Overview (Adult)  Goal: Plan of Care Review  Outcome: Ongoing (interventions implemented as appropriate)    07/15/17 1330   Coping/Psychosocial Response Interventions   Plan Of Care Reviewed With patient;spouse   Patient Care Overview   Progress improving   Outcome Evaluation   Outcome Summary/Follow up Plan PT's blood sugar spiked and Dr Lama paged for adjustment of Levemir/Lispro amounts. PT more oriented throughout shift but at times will disoriented X2 without impulsive behavior.         Problem: Cardiac Surgery (Adult)  Goal: Signs and Symptoms of Listed Potential Problems Will be Absent or Manageable (Cardiac Surgery)  Outcome: Ongoing (interventions implemented as appropriate)    07/15/17 1330   Cardiac Surgery   Problems Assessed (Cardiac Surgery) all   Problems Present (Cardiac Surgery) pain         Problem: Fall Risk (Adult)  Goal: Identify Related Risk Factors and Signs and Symptoms  Outcome: Ongoing (interventions implemented as appropriate)    07/15/17 1330   Fall Risk   Fall Risk: Related Risk Factors confusion/agitation;gait/mobility problems;polypharmacy       Goal: Absence of Falls  Outcome: Ongoing (interventions implemented as appropriate)    Problem: Pressure Ulcer Risk (Juan C Scale) (Adult,Obstetrics,Pediatric)  Goal: Identify Related Risk Factors and Signs and Symptoms  Outcome: Ongoing (interventions implemented as appropriate)    07/15/17 1330   Pressure Ulcer Risk (Juan C Scale)   Related Risk Factors (Pressure Ulcer Risk (Juan C Scale)) critical care admission;mobility impaired       Goal: Skin Integrity  Outcome: Ongoing (interventions implemented as appropriate)    07/15/17 1330   Pressure Ulcer Risk (Juan C Scale) (Adult,Obstetrics,Pediatric)   Skin Integrity making progress toward outcome

## 2017-07-15 NOTE — PLAN OF CARE
Problem: Patient Care Overview (Adult)  Goal: Plan of Care Review  Outcome: Ongoing (interventions implemented as appropriate)    07/15/17 0113   Coping/Psychosocial Response Interventions   Plan Of Care Reviewed With patient;spouse   Patient Care Overview   Progress progress towards functional goals is fair   Outcome Evaluation   Outcome Summary/Follow up Plan Confusion has become less of an issue as shift has progressed. Pt is [place] which seems to increase instances of confusion.Pain controlled w/ po pain meds. Pulmonary toliet encouraged. VSS, no acute events over shift.          Problem: Cardiac Surgery (Adult)  Goal: Signs and Symptoms of Listed Potential Problems Will be Absent or Manageable (Cardiac Surgery)  Outcome: Ongoing (interventions implemented as appropriate)    07/15/17 0113   Cardiac Surgery   Problems Assessed (Cardiac Surgery) all   Problems Present (Cardiac Surgery) situational response         Problem: Fall Risk (Adult)  Goal: Identify Related Risk Factors and Signs and Symptoms  Outcome: Ongoing (interventions implemented as appropriate)    07/15/17 0113   Fall Risk   Fall Risk: Related Risk Factors confusion/agitation;fatigue/slow reaction;gait/mobility problems;environment unfamiliar       Goal: Absence of Falls  Outcome: Ongoing (interventions implemented as appropriate)    07/15/17 0113   Fall Risk (Adult)   Absence of Falls making progress toward outcome

## 2017-07-16 LAB
ANION GAP SERPL CALCULATED.3IONS-SCNC: 3 MMOL/L (ref 3–11)
BUN BLD-MCNC: 37 MG/DL (ref 9–23)
BUN/CREAT SERPL: 18.5 (ref 7–25)
CALCIUM SPEC-SCNC: 10.3 MG/DL (ref 8.7–10.4)
CHLORIDE SERPL-SCNC: 102 MMOL/L (ref 99–109)
CO2 SERPL-SCNC: 30 MMOL/L (ref 20–31)
CREAT BLD-MCNC: 2 MG/DL (ref 0.6–1.3)
GFR SERPL CREATININE-BSD FRML MDRD: 34 ML/MIN/1.73
GLUCOSE BLD-MCNC: 196 MG/DL (ref 70–100)
GLUCOSE BLDC GLUCOMTR-MCNC: 128 MG/DL (ref 70–130)
GLUCOSE BLDC GLUCOMTR-MCNC: 151 MG/DL (ref 70–130)
GLUCOSE BLDC GLUCOMTR-MCNC: 185 MG/DL (ref 70–130)
GLUCOSE BLDC GLUCOMTR-MCNC: 195 MG/DL (ref 70–130)
POTASSIUM BLD-SCNC: 4.8 MMOL/L (ref 3.5–5.5)
SODIUM BLD-SCNC: 135 MMOL/L (ref 132–146)

## 2017-07-16 PROCEDURE — 80048 BASIC METABOLIC PNL TOTAL CA: CPT | Performed by: PHYSICIAN ASSISTANT

## 2017-07-16 PROCEDURE — 99024 POSTOP FOLLOW-UP VISIT: CPT | Performed by: THORACIC SURGERY (CARDIOTHORACIC VASCULAR SURGERY)

## 2017-07-16 PROCEDURE — 63710000001 INSULIN DETEMIR PER 5 UNITS: Performed by: INTERNAL MEDICINE

## 2017-07-16 PROCEDURE — 82962 GLUCOSE BLOOD TEST: CPT

## 2017-07-16 PROCEDURE — 99231 SBSQ HOSP IP/OBS SF/LOW 25: CPT | Performed by: INTERNAL MEDICINE

## 2017-07-16 PROCEDURE — 99232 SBSQ HOSP IP/OBS MODERATE 35: CPT | Performed by: INTERNAL MEDICINE

## 2017-07-16 RX ADMIN — INSULIN DETEMIR 35 UNITS: 100 INJECTION, SOLUTION SUBCUTANEOUS at 09:10

## 2017-07-16 RX ADMIN — INSULIN LISPRO 2 UNITS: 100 INJECTION, SOLUTION INTRAVENOUS; SUBCUTANEOUS at 09:10

## 2017-07-16 RX ADMIN — LOSARTAN POTASSIUM 50 MG: 50 TABLET ORAL at 09:09

## 2017-07-16 RX ADMIN — TAMSULOSIN HYDROCHLORIDE 0.4 MG: 0.4 CAPSULE ORAL at 09:09

## 2017-07-16 RX ADMIN — INSULIN LISPRO 10 UNITS: 100 INJECTION, SOLUTION INTRAVENOUS; SUBCUTANEOUS at 11:48

## 2017-07-16 RX ADMIN — INSULIN LISPRO 10 UNITS: 100 INJECTION, SOLUTION INTRAVENOUS; SUBCUTANEOUS at 16:45

## 2017-07-16 RX ADMIN — INSULIN LISPRO 10 UNITS: 100 INJECTION, SOLUTION INTRAVENOUS; SUBCUTANEOUS at 09:09

## 2017-07-16 RX ADMIN — FAMOTIDINE 20 MG: 20 TABLET ORAL at 16:45

## 2017-07-16 RX ADMIN — METOPROLOL TARTRATE 25 MG: 25 TABLET ORAL at 09:09

## 2017-07-16 RX ADMIN — AMLODIPINE BESYLATE 5 MG: 5 TABLET ORAL at 09:09

## 2017-07-16 RX ADMIN — FAMOTIDINE 20 MG: 20 TABLET ORAL at 09:09

## 2017-07-16 RX ADMIN — BISACODYL 10 MG: 10 SUPPOSITORY RECTAL at 11:48

## 2017-07-16 RX ADMIN — Medication 2 TABLET: at 09:09

## 2017-07-16 RX ADMIN — ASPIRIN 325 MG: 325 TABLET, DELAYED RELEASE ORAL at 09:09

## 2017-07-16 RX ADMIN — METOPROLOL TARTRATE 25 MG: 25 TABLET ORAL at 21:09

## 2017-07-16 RX ADMIN — ATORVASTATIN CALCIUM 40 MG: 40 TABLET, FILM COATED ORAL at 21:09

## 2017-07-16 RX ADMIN — INSULIN LISPRO 2 UNITS: 100 INJECTION, SOLUTION INTRAVENOUS; SUBCUTANEOUS at 11:47

## 2017-07-16 RX ADMIN — INSULIN LISPRO 2 UNITS: 100 INJECTION, SOLUTION INTRAVENOUS; SUBCUTANEOUS at 16:46

## 2017-07-16 NOTE — PROGRESS NOTES
"[facility] Progress Note     LOS: 4 days   Patient Care Team:  Provider Not In System as PCP - General  PCP:  Provider Not In System    Chief Complaint:  Fu cabg 7/12    SUBJECTIVE: In bathroom  States it will \"be awhile\"  To tele overnight      Review of Systems:   All systems have been reviewed and are negative with the exception of those mentioned above.      OBJECTIVE:    Vital Sign Min/Max for last 24 hours  Temp  Min: 98 °F (36.7 °C)  Max: 99.8 °F (37.7 °C)   BP  Min: 122/61  Max: 159/82   Pulse  Min: 76  Max: 97   Resp  Min: 18  Max: 24   SpO2  Min: 91 %  Max: 97 %   Flow (L/min)  Min: 3  Max: 3   Weight  Min: 239 lb 4.8 oz (109 kg)  Max: 239 lb 4.8 oz (109 kg)     Flowsheet Rows         First Filed Value    Admission Height  68\" (172.7 cm) Documented at 07/12/2017 0559    Admission Weight  248 lb (112 kg) Documented at 07/12/2017 0559          Telemetry: sr      Intake/Output Summary (Last 24 hours) at 07/16/17 1009  Last data filed at 07/16/17 0819   Gross per 24 hour   Intake              360 ml   Output             1430 ml   Net            -1070 ml     Intake & Output (last 3 days)       07/13 0701 - 07/14 0700 07/14 0701 - 07/15 0700 07/15 0701 - 07/16 0700 07/16 0701 - 07/17 0700    P.O.      I.V. (mL/kg) 1820 (16.2) 507 (4.5)      Blood        IV Piggyback 50       Total Intake(mL/kg) 2470 (22) 1107 (9.8) 1080 (9.9)     Urine (mL/kg/hr) 1290 (0.5) 1500 (0.6) 785 (0.3)     Other 300 (0.1)       Stool    720 (2.1)    Total Output 1590 1500 785 720    Net +880 -393 +295 -720            Unmeasured Urine Occurrence   2 x            Physical Exam:  Physical Exam     LABS/DIAGNOSTIC DATA:    Results from last 7 days  Lab Units 07/15/17  0247 07/14/17  0330 07/13/17  0334   WBC 10*3/mm3 11.43* 15.85* 12.81*   HEMOGLOBIN g/dL 9.3* 9.9* 10.4*   HEMATOCRIT % 28.0* 30.2* 31.1*   PLATELETS 10*3/mm3 150 143* 145*     No results found for: TROPONINT    Results from " last 7 days  Lab Units 07/13/17  0334 07/12/17  1200 07/11/17  1258   INR  1.05 1.15 1.02   APTT seconds  --  <24.0* 26.1       Results from last 7 days  Lab Units 07/16/17  0400 07/15/17  0247 07/14/17  0330  07/11/17  1258   SODIUM mmol/L 135 133 134  < > 135   POTASSIUM mmol/L 4.8 4.9 4.4  < > 4.7   CHLORIDE mmol/L 102 101 103  < > 101   CO2 mmol/L 30.0 26.0 25.0  < > 34.0*   BUN mg/dL 37* 32* 22  < > 25*   CREATININE mg/dL 2.00* 2.00* 2.10*  < > 1.80*   CALCIUM mg/dL 10.3 9.7 9.2  < > 10.0   BILIRUBIN mg/dL  --   --   --   --  0.4   ALK PHOS U/L  --   --   --   --  64   ALT (SGPT) U/L  --   --   --   --  18   AST (SGOT) U/L  --   --   --   --  17   GLUCOSE mg/dL 196* 260* 134*  < > 251*   < > = values in this interval not displayed.    Results from last 7 days  Lab Units 07/11/17  1258   HEMOGLOBIN A1C % 9.70*                   Medication Review:     amLODIPine 5 mg Oral Q24H   aspirin 325 mg Oral Daily   atorvastatin 40 mg Oral Nightly   famotidine 20 mg Oral BID   insulin detemir 35 Units Subcutaneous Q12H   insulin lispro 0-7 Units Subcutaneous 4x Daily With Meals & Nightly   insulin lispro 10 Units Subcutaneous TID With Meals   losartan 50 mg Oral Q24H   metoprolol tartrate 25 mg Oral Q12H   pharmacy consult - MTM  Does not apply Daily   sennosides-docusate sodium 2 tablet Oral BID   tamsulosin 0.4 mg Oral Daily            Principal Problem:    CAD (coronary artery disease), status post CABG ×4 on 07/12/2017.  Active Problems:    Transient cerebral ischemia    Diabetes mellitus, type 2, uncontrolled with A1c of 9.7    Hypertension    Hyperlipidemia    CKD (chronic kidney disease), stage III      ASSESSMENT/PLAN:  Cad post cabg 7/12  preop ef 60%    ckd 1.8-2.0    Dm            Saul Reis MD   07/16/17  10:09 AM

## 2017-07-16 NOTE — PROGRESS NOTES
"      HOSPITALIST DAILY PROGRESS NOTE    Chief Complaint: f/u CABG    Subjective   SUBJECTIVE/OVERNIGHT EVENTS   Pt doing well today, no issues overnight.     Review of Systems:  Gen-no fevers, no chills  CV-no chest pain, no palpitations  Resp-no cough, no dyspnea  GI-no N/V/D, no abd pain    Objective   OBJECTIVE   I have reviewed the vital signs.  /85 (BP Location: Left arm, Patient Position: Lying)  Pulse 96  Temp 99.8 °F (37.7 °C) (Oral)   Resp 20  Ht 68\" (172.7 cm)  Wt 239 lb 4.8 oz (109 kg)  SpO2 96%  BMI 36.39 kg/m2    Physical Exam:  Gen-no acute distress, obese WM  CV-RRR, S1 S2 normal, no m/r/g, well healing sternotomy site  Resp-CTAB, no wheezes  Abd-soft, NT, ND, +BS  Ext-no edema, second digit left hand s/p amputation  Neuro-A&Ox3, no focal deficits  Psych-appropriate mood    Results:  I have reviewed the labs, culture data, radiology results, and diagnostic studies.      Results from last 7 days  Lab Units 07/15/17  0247 07/14/17  0330 07/13/17  0334   WBC 10*3/mm3 11.43* 15.85* 12.81*   HEMOGLOBIN g/dL 9.3* 9.9* 10.4*   HEMATOCRIT % 28.0* 30.2* 31.1*   PLATELETS 10*3/mm3 150 143* 145*       Results from last 7 days  Lab Units 07/16/17  0400   SODIUM mmol/L 135   POTASSIUM mmol/L 4.8   CHLORIDE mmol/L 102   CO2 mmol/L 30.0   BUN mg/dL 37*   CREATININE mg/dL 2.00*   GLUCOSE mg/dL 196*   CALCIUM mg/dL 10.3       Culture Data:  Cultures:    Urine Culture   Date Value Ref Range Status   07/12/2017 No growth at 2 days  Final         Radiology Results:  Imaging Results (last 24 hours)     ** No results found for the last 24 hours. **          I have reviewed the medications.      Assessment/Plan   ASSESSMENT/PLAN    Principal Problem:    CAD (coronary artery disease), status post CABG ×4 on 07/12/2017.  Active Problems:    Transient cerebral ischemia    Diabetes mellitus, type 2, uncontrolled with A1c of 9.7    Hypertension    Hyperlipidemia    CKD (chronic kidney disease), stage III      Mr. " Chuyita is a 66 yo who presented for CABG.  Transferred out of the ICU on 7/16/17.    Plan:  --monitor FSBS today, adjust basal/bolus regimen as indicated by SSI  --PT/OT      I expect patient to be discharged in: TBD per primary service    Odalis Do MD  07/16/17  12:12 PM

## 2017-07-16 NOTE — PROGRESS NOTES
CTS Progress Note      POD 4 s/p CABG x 4       LOS: 4 days     Subjective  Transferred to [facility] overnight. Some confusion overnight, doing well this am.     Objective    Vital Signs  Temp:  [98 °F (36.7 °C)-99.8 °F (37.7 °C)] 99.8 °F (37.7 °C)  Heart Rate:  [74-97] 96  Resp:  [18-24] 20  BP: (121-159)/(58-85) 153/85    Physical Exam:   General Appearance: alert, appears stated age and cooperative   Lungs: Decreased lung sounds marian   Heart: regular rhythm & normal rate, normal S1, S2 and no murmur, no noemi, no rub   Skin: Incision c/d/i     Results     Results from last 7 days  Lab Units 07/15/17  0247   WBC 10*3/mm3 11.43*   HEMOGLOBIN g/dL 9.3*   HEMATOCRIT % 28.0*   PLATELETS 10*3/mm3 150       Results from last 7 days  Lab Units 07/16/17  0400   SODIUM mmol/L 135   POTASSIUM mmol/L 4.8   CHLORIDE mmol/L 102   CO2 mmol/L 30.0   BUN mg/dL 37*   CREATININE mg/dL 2.00*   GLUCOSE mg/dL 196*   CALCIUM mg/dL 10.3       Imaging Results (last 24 hours)     ** No results found for the last 24 hours. **          Assessment    Principal Problem:    CAD (coronary artery disease), status post CABG ×4 on 07/12/2017.  Active Problems:    Transient cerebral ischemia    Diabetes mellitus, type 2, uncontrolled with A1c of 9.7    Hypertension    Hyperlipidemia    CKD (chronic kidney disease), stage III        Plan   Ambulate  Pulm toilet  DM mgnt per hospitalist  Dispo - discharge home 1-2 days if all agree    JANAK Fox  07/16/17  8:24 AM    As above agree.      Ruben Vasques MD  [facility]  07/16/17   11:44 AM

## 2017-07-16 NOTE — PLAN OF CARE
Problem: Patient Care Overview (Adult)  Goal: Plan of Care Review  Outcome: Ongoing (interventions implemented as appropriate)    07/16/17 0418   Coping/Psychosocial Response Interventions   Plan Of Care Reviewed With patient   Patient Care Overview   Progress improving

## 2017-07-17 VITALS
BODY MASS INDEX: 37.18 KG/M2 | SYSTOLIC BLOOD PRESSURE: 160 MMHG | DIASTOLIC BLOOD PRESSURE: 84 MMHG | RESPIRATION RATE: 20 BRPM | WEIGHT: 245.3 LBS | OXYGEN SATURATION: 91 % | HEART RATE: 92 BPM | TEMPERATURE: 99.2 F | HEIGHT: 68 IN

## 2017-07-17 LAB
ANION GAP SERPL CALCULATED.3IONS-SCNC: 8 MMOL/L (ref 3–11)
BUN BLD-MCNC: 29 MG/DL (ref 9–23)
BUN/CREAT SERPL: 18.1 (ref 7–25)
CALCIUM SPEC-SCNC: 10.3 MG/DL (ref 8.7–10.4)
CHLORIDE SERPL-SCNC: 100 MMOL/L (ref 99–109)
CO2 SERPL-SCNC: 28 MMOL/L (ref 20–31)
CREAT BLD-MCNC: 1.6 MG/DL (ref 0.6–1.3)
GFR SERPL CREATININE-BSD FRML MDRD: 43 ML/MIN/1.73
GLUCOSE BLD-MCNC: 186 MG/DL (ref 70–100)
GLUCOSE BLDC GLUCOMTR-MCNC: 120 MG/DL (ref 70–130)
GLUCOSE BLDC GLUCOMTR-MCNC: 165 MG/DL (ref 70–130)
GLUCOSE BLDC GLUCOMTR-MCNC: 197 MG/DL (ref 70–130)
POTASSIUM BLD-SCNC: 4.1 MMOL/L (ref 3.5–5.5)
SODIUM BLD-SCNC: 136 MMOL/L (ref 132–146)

## 2017-07-17 PROCEDURE — 97116 GAIT TRAINING THERAPY: CPT

## 2017-07-17 PROCEDURE — 99232 SBSQ HOSP IP/OBS MODERATE 35: CPT | Performed by: NURSE PRACTITIONER

## 2017-07-17 PROCEDURE — 82962 GLUCOSE BLOOD TEST: CPT

## 2017-07-17 PROCEDURE — 99024 POSTOP FOLLOW-UP VISIT: CPT | Performed by: THORACIC SURGERY (CARDIOTHORACIC VASCULAR SURGERY)

## 2017-07-17 PROCEDURE — 63710000001 INSULIN DETEMIR PER 5 UNITS: Performed by: INTERNAL MEDICINE

## 2017-07-17 PROCEDURE — 97110 THERAPEUTIC EXERCISES: CPT

## 2017-07-17 PROCEDURE — 80048 BASIC METABOLIC PNL TOTAL CA: CPT | Performed by: PHYSICIAN ASSISTANT

## 2017-07-17 PROCEDURE — 99024 POSTOP FOLLOW-UP VISIT: CPT | Performed by: PHYSICIAN ASSISTANT

## 2017-07-17 RX ORDER — HYDROCODONE BITARTRATE AND ACETAMINOPHEN 5; 325 MG/1; MG/1
1 TABLET ORAL EVERY 6 HOURS PRN
Status: DISCONTINUED | OUTPATIENT
Start: 2017-07-17 | End: 2017-07-17 | Stop reason: HOSPADM

## 2017-07-17 RX ORDER — ATORVASTATIN CALCIUM 40 MG/1
40 TABLET, FILM COATED ORAL NIGHTLY
Qty: 30 TABLET | Refills: 12 | Status: SHIPPED | OUTPATIENT
Start: 2017-07-17

## 2017-07-17 RX ORDER — HYDROCODONE BITARTRATE AND ACETAMINOPHEN 5; 325 MG/1; MG/1
1 TABLET ORAL EVERY 6 HOURS PRN
Qty: 15 TABLET | Refills: 0 | Status: SHIPPED | OUTPATIENT
Start: 2017-07-17 | End: 2017-07-27

## 2017-07-17 RX ADMIN — METOPROLOL TARTRATE 25 MG: 25 TABLET ORAL at 08:12

## 2017-07-17 RX ADMIN — AMLODIPINE BESYLATE 5 MG: 5 TABLET ORAL at 08:12

## 2017-07-17 RX ADMIN — INSULIN LISPRO 10 UNITS: 100 INJECTION, SOLUTION INTRAVENOUS; SUBCUTANEOUS at 11:43

## 2017-07-17 RX ADMIN — INSULIN LISPRO 10 UNITS: 100 INJECTION, SOLUTION INTRAVENOUS; SUBCUTANEOUS at 08:10

## 2017-07-17 RX ADMIN — LOSARTAN POTASSIUM 50 MG: 50 TABLET ORAL at 08:12

## 2017-07-17 RX ADMIN — ACETAMINOPHEN 650 MG: 325 TABLET, FILM COATED ORAL at 17:08

## 2017-07-17 RX ADMIN — INSULIN LISPRO 10 UNITS: 100 INJECTION, SOLUTION INTRAVENOUS; SUBCUTANEOUS at 17:08

## 2017-07-17 RX ADMIN — INSULIN DETEMIR 35 UNITS: 100 INJECTION, SOLUTION SUBCUTANEOUS at 09:54

## 2017-07-17 RX ADMIN — INSULIN LISPRO 2 UNITS: 100 INJECTION, SOLUTION INTRAVENOUS; SUBCUTANEOUS at 11:41

## 2017-07-17 RX ADMIN — ASPIRIN 325 MG: 325 TABLET, DELAYED RELEASE ORAL at 08:11

## 2017-07-17 RX ADMIN — TAMSULOSIN HYDROCHLORIDE 0.4 MG: 0.4 CAPSULE ORAL at 08:11

## 2017-07-17 RX ADMIN — Medication 2 TABLET: at 08:11

## 2017-07-17 RX ADMIN — INSULIN LISPRO 2 UNITS: 100 INJECTION, SOLUTION INTRAVENOUS; SUBCUTANEOUS at 17:09

## 2017-07-17 RX ADMIN — FAMOTIDINE 20 MG: 20 TABLET ORAL at 08:11

## 2017-07-17 NOTE — DISCHARGE INSTRUCTIONS
Take blood glucose readings before each meal and at bedtime.  If there are 2 readings over 150 mg/dl, notify PCP. Patient will be seeing PCP July 25th.

## 2017-07-17 NOTE — DISCHARGE INSTR - OTHER ORDERS
[facility] to provide overnight pulse oximetry to see if you qualify for home oxygen for night time use.  Phone: [phone]

## 2017-07-17 NOTE — PROGRESS NOTES
CTS Progress Note      POD 5 s/p CABG ×4        Subjective  No new complaints.  Slight pain but controlled.  Slight confusion but improved.  Positive bowel movement.  Wife questioning when to restart Eliquis  Objective    Physical Exam:   Vital Signs   Temp:  [98.7 °F (37.1 °C)-99 °F (37.2 °C)] 98.7 °F (37.1 °C)  Heart Rate:  [] 93  Resp:  [18] 18  BP: (136-160)/(78-90) 160/86   GEN: NAD   CV: Regular sinus rhythm    RESP: Unlabored   EXT: 2+ pitting edema bilateral lower extremities below the knee      Incision: Clean dry and intact        TELE: Sinus rhythm     Results       Results from last 7 days  Lab Units 07/15/17  0247   WBC 10*3/mm3 11.43*   HEMOGLOBIN g/dL 9.3*   HEMATOCRIT % 28.0*   PLATELETS 10*3/mm3 150       Results from last 7 days  Lab Units 07/16/17  0400   SODIUM mmol/L 135   POTASSIUM mmol/L 4.8   CHLORIDE mmol/L 102   CO2 mmol/L 30.0   BUN mg/dL 37*   CREATININE mg/dL 2.00*   GLUCOSE mg/dL 196*   CALCIUM mg/dL 10.3       Results from last 7 days  Lab Units 07/13/17  0334 07/12/17  1200   INR  1.05 1.15   APTT seconds  --  <24.0*             Assessment/Plan     Principal Problem:    CAD (coronary artery disease), status post CABG ×4 on 07/12/2017.  Active Problems:    Transient cerebral ischemia    Diabetes mellitus, type 2, uncontrolled with A1c of 9.7    Hypertension    Hyperlipidemia    CKD (chronic kidney disease), stage III        Plan   Question need for gentle diuresis with bilateral lower extremity edema however creatinine at 2.0 yesterday.  We'll recheck BMP this morning  Hopefully home soon  Question timing on restarting EliquisPer cardiology   07/17/17  8:21 AM

## 2017-07-17 NOTE — PROGRESS NOTES
Continued Stay Note  [facility]     Patient Name: Jakob Boogie  MRN: [id number]  Today's Date: 7/17/2017    Admit Date: 7/12/2017          Discharge Plan       07/17/17 0847    Case Management/Social Work Plan    Plan Home    Patient/Family In Agreement With Plan yes    Additional Comments I spoke with Mr. Boogie and his wife at the bedside. His discharge plan is the same- home. He denies needs for home health services. He did say that he was requiring oxygen at night. There was an oxygen saturation of 85 % documented on room air at night. Mr. Boogie would like to use [facility]. I called referral to [facility]. They are going to do an overnight pulse oximetry test to see if Mr. Boogie qualifies for home oxygen at night. They arrange the test for tonight and follow up with him at home.               Discharge Codes     None        Expected Discharge Date and Time     Expected Discharge Date Expected Discharge Time    Jul 17, 2017             Frankie Gibson

## 2017-07-17 NOTE — PROGRESS NOTES
"    [facility] Medicine Services  INPATIENT PROGRESS NOTE    Date of Admission: 7/12/2017  Length of Stay: 5  Primary Care Physician: Provider Not In System    Subjective   CC: f/u CABG med mgmt     HPI:  Resting upright in chair in no acute distress.  No visitors at bedside.  States that he feels \"okay\" and feels ready for discharge home.  Tolerating diet well.  Denies nausea, vomiting, chest pain, shortness of air.  Reports incisional pain well controlled.  No new issues and feels ready for discharge home today, as he states his primary service has told him he will discharge home.      Review of Systems  Gen-no fevers, no chills  CV-no chest pain, no palpitations  Resp-no cough, no dyspnea  GI-no N/V/D, no abd pain       Objective      Temp:  [98.7 °F (37.1 °C)-99.2 °F (37.3 °C)] 99.2 °F (37.3 °C)  Heart Rate:  [] 81  Resp:  [18-20] 20  BP: (136-160)/(78-90) 160/84     Physical Exam  Gen-no acute distress, obese WM resting upright in chair.  No visitors at bs.   CV-RRR, S1 S2 normal, no m/r/g, well healing sternotomy site.  Old epigastric MT drain sites with gauze drsg in place, dry and intact.   Resp-CTAB A/P, no wheezes/no rales  Abd-soft, obese, NT, ND, +BS  Ext-no edema, second digit left hand s/p old amputation.  Rt inner leg VH site c/d/i  Neuro-A&Ox4, no focal deficits. Follows commands.   Psych-appropriate mood, calm and cooperative     Results Review:    I have reviewed the labs, radiology results and diagnostic studies.      Results from last 7 days  Lab Units 07/15/17  0247   WBC 10*3/mm3 11.43*   HEMOGLOBIN g/dL 9.3*   PLATELETS 10*3/mm3 150       Results from last 7 days  Lab Units 07/17/17  0936   SODIUM mmol/L 136   POTASSIUM mmol/L 4.1   CHLORIDE mmol/L 100   CO2 mmol/L 28.0   BUN mg/dL 29*   CREATININE mg/dL 1.60*   GLUCOSE mg/dL 186*   CALCIUM mg/dL 10.3       Culture Data: Cultures:    Urine Culture   Date Value Ref Range Status   07/12/2017 No growth at 2 days  " Final       Radiology Data:   Imaging Results (last 24 hours)     ** No results found for the last 24 hours. **          I have reviewed the medications.    Assessment/Plan     Problem List  Hospital Problem List     * (Principal)CAD (coronary artery disease), status post CABG ×4 on 07/12/2017.    Transient cerebral ischemia    Diabetes mellitus, type 2, uncontrolled with A1c of 9.7    Hypertension    Hyperlipidemia    CKD (chronic kidney disease), stage III          Mr. Boogie is a 66 yo who presented for CABG. Transferred out of the ICU on 7/16/17.     Plan:  --Glucoses last 24 hours 120-185.  Seems to be tolerating current regimen of Levemir 35 units 3 times a day, 10 units Humalog 3 times a day before meals.  Is on low-dose sliding scale and has only required for additional units in the last 24 hours.  --Wife states that she thought patient was only using 30 units of basal twice a day along with his glipizide at home.  His medical record reflects a 40 units twice a day dosing.  Cardiology has resumed his home dosing of basal insulin and glipizide.  This should be fine for now.  Patient and wife have been instructed to check glucoses before meals at bedtime, keep log and notify PCP for any 2 consecutive glucoses greater than 150 after 24 hours on home regimen again.  He will need to take glucose log to his PCP follow-up appointment for tighter glucose management noting that his A1c is 9.7.  He agrees.  --Today patient states primary service likely to discharge him home.  --PT/OT        I expect patient to be discharged in: TBD per primary service       Mamta Alvarez, HIPOLITO   07/17/17   12:50 PM

## 2017-07-17 NOTE — DISCHARGE PLACEMENT REQUEST
"ChuyitaJakob peacock (67 y.o. Male)     Date of Birth Social Security Number Address Home Phone MRN    1950  [address]  [facility] [address] [phone] [id number]    Mosque Marital Status          Yazidism        Admission Date Admission Type Admitting Provider Attending Provider Department, Room/Bed    7/12/17 Elective Joey Mcmillan MD Mitchell, Robert O, MD [address], [address]/1    Discharge Date Discharge Disposition Discharge Destination         Home or Self Care             Attending Provider: Joey Mcmillan MD     Allergies:  Bactrim [Sulfamethoxazole-trimethoprim]    Isolation:  None   Infection:  None   Code Status:  FULL    Ht:  68\" (172.7 cm)   Wt:  245 lb 4.8 oz (111 kg)    Admission Cmt:  None   Principal Problem:  CAD (coronary artery disease), status post CABG ×4 on 07/12/2017. [I25.10]                 Active Insurance as of 7/12/2017     Primary Coverage     Payor Plan Insurance Group Employer/Plan Group    [employer] EMPLOYEE [id number]     Payor Plan Address Payor Plan Phone Number Effective From Effective To    [address] [phone] 7/1/2016     [place] [address]       Subscriber Name Subscriber Birth Date Member ID       GHADA VERNON 1950 [id number]           Secondary Coverage     Payor Plan Insurance Group Employer/Plan Group    MEDICARE MEDICARE A ONLY      Payor Plan Address Payor Plan Phone Number Effective From Effective To    [address] [phone] 7/1/2016     [place] [address]       Subscriber Name Subscriber Birth Date Member ID       JAKOB VERNON 1950 [id number]           Tertiary Coverage     Payor Plan Insurance Group Employer/Plan Group    [employer] [place] [employer] [place]      Payor Plan Address Payor Plan Phone Number Effective From Effective To    [employer] [place] [employer] [phone] 7/1/2016     [place] [id number]       Subscriber Name Subscriber Birth Date Member ID       " PJ BOOGIE 1950 [id number]                 Emergency Contacts      (Rel.) Home Phone Work Phone Mobile Phone    Tiffany Boogie (Spouse) [phone] -- [phone]    ChuyitaNaveed (Son) [phone] -- --        Inpatient Consult to Case Management  [[id number]] (Order [id number])   Consult    Date: 7/17/2017   Department: [address]   Ordering/Authorizing: Joey Mcmillan MD         Standing Order Information      Remaining Occurrences Interval Last Released              0/1 Once 7/17/2017          Order History  Inpatient     Date/Time Action Taken User Additional Information     07/17/17 1309 Sign Frankie Gibson Ordering Mode: Verbal with readback     07/17/17 1309 Release Instance Frankie Gibson (auto-released) Released Order: [id number]     07/17/17 1320 Acknowledge Annie Conde RN New Order       Order Details      Frequency Duration Priority Order Class     Once 1  occurrence Routine Hospital Performed       Order Questions      Question Answer Comment     Reason for Consult? overnight pulse ox        Source Order Set / Preference List      Preference List     [facility] GENERAL CONSULTS [[id number]]       Verbal Order Info      Action Created on Order Mode Entered by Responsible Provider Signed by Signed on     Ordering 07/17/17 1309 Verbal with readback Frankie Mcmillan MD         Consult Order Info      ID Description Priority Start Date Start Time     [id number] Inpatient Consult to Case Management  Routine 07/17/2017  1:10 PM         Provider Specialty Referred to     ______________________________________ _____________________________________       Acknowledgement Info      For At Acknowledged By Acknowledged On     Placing Order 07/17/17 1309 Annie Conde RN 07/17/17 1320       Reprint Order Requisition      Inpatient Consult to Case Management  (Order #[id number]) on 7/17/17       Encounter       View Encounter           Order Provider Info          Office phone Pager E-mail     Ordering User Frankie Gibson -- -- --     Authorizing Provider Joey Mcmillan [phone] -- --     Attending Provider Joey Mcmillan [phone] -- --     Entered By Frankie Gibson -- -- --     Ordering Provider Joey Mcmillan [phone] -- --       Order Transmittal Tracking      Inpatient Consult to Case [facility]  (Order #[id number]) on 7/17/17                History & Physical      Joey Mcmillan MD at 7/3/2017  8:00 AM          07/03/2017  Patient Information  Jakob Boogie                                                                                          [address]  [facility] [address]   1950  'PCP/Referring Physician'  Provider Not In System  None  No ref. provider found    Chief Complaint   Patient presents with   • Consult     NP per Dr. Mosqueda for CAD        History of Present Illness:  Patient was referred to me to evaluate for coronary bypass grafting surgery.  Patient has a history of previous coronary artery stenting.  Recently he states with minimal activity he has substernal chest pain that he describes as tight and heavy.  He said it radiates into both arms and is associated with shortness of breath.  He states that on one occasion he had associated nausea but that is only been on 1 occasion.  When the pain occurs and he will rest he says it takes at least 10 minutes for the pain to totally resolved.  He has had a recent cardiac catheterization demonstrating significant coronary artery disease and I was asked see and evaluate for surgery. It should also be noted that he had had a transient ischemic attack in the past with slurred speech which resolved.  The carotids were evaluated and echocardiogram was performed and no cause of this transient ischemic attack was never elucidated.  He also has had no documented atrial fibrillation.      Patient  Active Problem List   Diagnosis   • Transient cerebral ischemia   • Diabetes mellitus, type 2   • GERD (gastroesophageal reflux disease)   • Hypertension   • Lyme disease   • Hyperlipidemia   • Coronary artery disease   • CKD (chronic kidney disease), stage III   • Basilar artery stenosis     Past Medical History:   Diagnosis Date   • Abdominal hernia    • Arthritis    • CKD (chronic kidney disease), stage III    • Coronary artery disease    • Diabetes mellitus, type 2    • GERD (gastroesophageal reflux disease)    • Hyperlipidemia    • Hypertension    • TIA (transient ischemic attack) 05/13/2017     Past Surgical History:   Procedure Laterality Date   • AMPUTATION      left index   • CHOLECYSTECTOMY     • CORONARY STENT PLACEMENT         Current Outpatient Prescriptions:   •  amLODIPine (NORVASC) 5 MG tablet, Take 1 tablet by mouth Daily., Disp: 30 tablet, Rfl: 0  •  apixaban (ELIQUIS) 5 MG tablet tablet, Take 1 tablet by mouth Every 12 (Twelve) Hours., Disp: 60 tablet, Rfl: 0  •  aspirin 81 MG EC tablet, Take 81 mg by mouth Daily., Disp: , Rfl:   •  fenofibrate (TRICOR) 48 MG tablet, Take 48 mg by mouth Every Night., Disp: , Rfl:   •  hydrochlorothiazide (HYDRODIURIL) 25 MG tablet, Take 25 mg by mouth Daily., Disp: , Rfl:   •  insulin glargine (LANTUS) 100 UNIT/ML injection, Inject  under the skin Daily., Disp: , Rfl:   •  losartan (COZAAR) 50 MG tablet, Take 100 mg by mouth Daily., Disp: , Rfl:   •  metoprolol tartrate (LOPRESSOR) 25 MG tablet, Take 1 tablet by mouth Every 12 (Twelve) Hours., Disp: 60 tablet, Rfl: 0  •  tamsulosin (FLOMAX) 0.4 MG capsule 24 hr capsule, Take 1 capsule by mouth Every Night., Disp: , Rfl:   •  traZODone (DESYREL) 75 MG half tablet, Take 150 mg by mouth Every Night., Disp: , Rfl:   •  glipiZIDE (GLUCOTROL) 10 MG tablet, Take 10 mg by mouth 2 (Two) Times a Day Before Meals., Disp: , Rfl:   Allergies   Allergen Reactions   • Bactrim [Sulfamethoxazole-Trimethoprim] Hives     On patients  "arms, per wife \"look like mosquito bites\"     Social History     Social History   • Marital status:      Spouse name: N/A   • Number of children: 1   • Years of education: N/A     Occupational History   • self employed-[employer]      Social History Main Topics   • Smoking status: Never Smoker   • Smokeless tobacco: Never Used   • Alcohol use No   • Drug use: No   • Sexual activity: Defer     Other Topics Concern   • Not on file     Social History Narrative    Lives with his wife in [place], [place].      Family History   Problem Relation Age of Onset   • Stroke Mother    • Heart disease Mother    • Diabetes Mother    • Heart disease Father    • Alzheimer's disease Father      Review of Systems   Constitution: Negative for chills, fever, malaise/fatigue, night sweats and weight loss.   HENT: Positive for hearing loss. Negative for headaches, odynophagia and sore throat.    Cardiovascular: Positive for chest pain and dyspnea on exertion. Negative for leg swelling, orthopnea and palpitations.   Respiratory: Negative for cough and hemoptysis.    Endocrine: Negative for cold intolerance, heat intolerance, polydipsia, polyphagia and polyuria.   Hematologic/Lymphatic: Bruises/bleeds easily.   Skin: Negative for itching and rash.   Musculoskeletal: Positive for back pain (bone spurs). Negative for joint pain, joint swelling and myalgias.   Gastrointestinal: Negative for abdominal pain, constipation, diarrhea, hematemesis, hematochezia, melena, nausea and vomiting.   Genitourinary: Negative for dysuria, frequency and hematuria.   Neurological: Positive for numbness (right knee to foot). Negative for focal weakness and seizures.   Psychiatric/Behavioral: Negative for suicidal ideas.   All other systems reviewed and are negative.    Vitals:    07/03/17 0748   BP: 130/80   BP Location: Left arm   Patient Position: Sitting   Pulse: 52   Temp: 96.9 °F (36.1 °C)   Weight: 247 lb (112 kg)   Height: 68\" (172.7 cm)      Physical Exam "   CONSTITUTIONAL: Alert and conversant, Well dressed, Well nourished, No acute distress  EYES: Sclera clean, Anicteric, Pupils equal  ENT: No nasal deviation, Trachea midline  NECK: No neck masses, Supple  LUNGS: No wheezing, Cough, non-congested  HEART: No rubs, No murmurs  GI:  Soft, non-distended, No masses, Non tender  to palpation there is a large ventral hernia.  Bowel sounds normal.  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    Labs/Imaging:   I reviewed the heart catheterization images and report.  It demonstrates significant coronary artery disease.  His wife here also confirms the story of his angina.    Assessment/Plan:  DIABETES: I will manage the patient's blood sugars closely, both intraoperatively and postoperatively.  This patient may require a continuous insulin infusion to maintain strict serum glucose control.  I will coordinate the patient's glucose management with the hospital endocrinologist or hospitalist service if the management becomes problematic. The patient is aware that diabetes can complicate their postoperative course and contribute to infection or wound- healing issues.    HYPERTENSION: The patient has known hypertension. I will manage the blood pressure closely intraoperatively and postoperatively to maintain end organ perfusion, but also to mitigate against bleeding caused by extreme hypertension.  Will evaluate for beta blockade prior to anesthesia. Hypertension postoperatively will complicate bleeding problems.    HYPERLIPIDEMIA: The patient's statin medication will be continued up to and including the day of surgery. Dietary changes have been  "discussed.    Patient has underlying renal insufficiency which further complicates his heart surgery.  He also has had a previous stroke and the cause was never elucidated.  I have explained to the patient and his wife that coronary surgery is a complex operation that has with it a risk of stroke, bleeding, infection and death and they agreed to proceed.  I have indicated that his risk of stroke might be higher as he has had a previous stroke.  They agreed to proceed.  He will stop Eliquis a few days prior to surgery.         Patient Active Problem List   Diagnosis   • Transient cerebral ischemia   • Diabetes mellitus, type 2   • GERD (gastroesophageal reflux disease)   • Hypertension   • Lyme disease   • Hyperlipidemia   • Coronary artery disease   • CKD (chronic kidney disease), stage III   • Basilar artery stenosis     Signed by: Joey Mcmillan M.D.    7/3/2017    CC:  MD Teresa Means, , editing for Joey Mcmillan M.D.    I, Joey Mcmillan MD, have read and agree with the editing done by Stephanie Monteiro, .     Electronically signed by Joey Mcmillan MD at 7/3/2017 11:13 AM      Joey Mcmillan MD at 7/12/2017  6:29 AM          Pre-Op H&P (See Recent Office Note Attached)    Chief complaint: MVCAD    HPI:      Patient is a 66 y.o. male who presents with MVCAD and here today for CABG with ORLIN/EVH for SVG    Review of Systems:  General ROS:  no fever, chills, rashes, No change since last office visit  Cardiovascular ROS: no chest pain since last visit or dyspnea on exertion  Respiratory ROS: no cough, shortness of breath, or wheezing    Immunization History:   Influenza:  no  Pneumococcal:  no  Tetanus:  no    Vital Signs:  /83 (BP Location: Right arm, Patient Position: Lying)  Pulse 63  Temp 97.8 °F (36.6 °C) (Tympanic)   Resp 18  Ht 68\" (172.7 cm)  Wt 248 lb (112 kg)  SpO2 97%  BMI 37.71 kg/m2    Physical Exam:    CV:  S1S2 regular " rate and rhythm, no murmur               Resp:  Clear to auscultation; respirations regular, even and unlabored    Results Review:    Results reviewed    Plan for coronary surgery today.  Patient is again aware of the risk of stroke bleeding infection death and renal failure.  He has had a previous transient ischemic attack he has renal insufficiency and chronic obstructive pulmonary disease.  He agrees to proceed    Mamta Perez, APRN  7/12/2017 6:33 AM         Electronically signed by Joey Mcmillan MD at 7/12/2017  7:03 AM    Source Note             07/03/2017  Patient Information  Jakob Boogie                                                                                          [address] [facility] [address]  [facility] [address]   1950  'PCP/Referring Physician'  Provider Not In System  None  No ref. provider found    Chief Complaint   Patient presents with   • Consult     NP per Dr. Mosqueda for CAD        History of Present Illness:  Patient was referred to me to evaluate for coronary bypass grafting surgery.  Patient has a history of previous coronary artery stenting.  Recently he states with minimal activity he has substernal chest pain that he describes as tight and heavy.  He said it radiates into both arms and is associated with shortness of breath.  He states that on one occasion he had associated nausea but that is only been on 1 occasion.  When the pain occurs and he will rest he says it takes at least 10 minutes for the pain to totally resolved.  He has had a recent cardiac catheterization demonstrating significant coronary artery disease and I was asked see and evaluate for surgery. It should also be noted that he had had a transient ischemic attack in the past with slurred speech which resolved.  The carotids were evaluated and echocardiogram was performed and no cause of this transient ischemic attack was never elucidated.  He also has had no documented atrial fibrillation.      Patient Active  Problem List   Diagnosis   • Transient cerebral ischemia   • Diabetes mellitus, type 2   • GERD (gastroesophageal reflux disease)   • Hypertension   • Lyme disease   • Hyperlipidemia   • Coronary artery disease   • CKD (chronic kidney disease), stage III   • Basilar artery stenosis     Past Medical History:   Diagnosis Date   • Abdominal hernia    • Arthritis    • CKD (chronic kidney disease), stage III    • Coronary artery disease    • Diabetes mellitus, type 2    • GERD (gastroesophageal reflux disease)    • Hyperlipidemia    • Hypertension    • TIA (transient ischemic attack) 05/13/2017     Past Surgical History:   Procedure Laterality Date   • AMPUTATION      left index   • CHOLECYSTECTOMY     • CORONARY STENT PLACEMENT         Current Outpatient Prescriptions:   •  amLODIPine (NORVASC) 5 MG tablet, Take 1 tablet by mouth Daily., Disp: 30 tablet, Rfl: 0  •  apixaban (ELIQUIS) 5 MG tablet tablet, Take 1 tablet by mouth Every 12 (Twelve) Hours., Disp: 60 tablet, Rfl: 0  •  aspirin 81 MG EC tablet, Take 81 mg by mouth Daily., Disp: , Rfl:   •  fenofibrate (TRICOR) 48 MG tablet, Take 48 mg by mouth Every Night., Disp: , Rfl:   •  hydrochlorothiazide (HYDRODIURIL) 25 MG tablet, Take 25 mg by mouth Daily., Disp: , Rfl:   •  insulin glargine (LANTUS) 100 UNIT/ML injection, Inject  under the skin Daily., Disp: , Rfl:   •  losartan (COZAAR) 50 MG tablet, Take 100 mg by mouth Daily., Disp: , Rfl:   •  metoprolol tartrate (LOPRESSOR) 25 MG tablet, Take 1 tablet by mouth Every 12 (Twelve) Hours., Disp: 60 tablet, Rfl: 0  •  tamsulosin (FLOMAX) 0.4 MG capsule 24 hr capsule, Take 1 capsule by mouth Every Night., Disp: , Rfl:   •  traZODone (DESYREL) 75 MG half tablet, Take 150 mg by mouth Every Night., Disp: , Rfl:   •  glipiZIDE (GLUCOTROL) 10 MG tablet, Take 10 mg by mouth 2 (Two) Times a Day Before Meals., Disp: , Rfl:   Allergies   Allergen Reactions   • Bactrim [Sulfamethoxazole-Trimethoprim] Hives     On patients arms,  "per wife \"look like mosquito bites\"     Social History     Social History   • Marital status:      Spouse name: N/A   • Number of children: 1   • Years of education: N/A     Occupational History   • self employed-[employer]      Social History Main Topics   • Smoking status: Never Smoker   • Smokeless tobacco: Never Used   • Alcohol use No   • Drug use: No   • Sexual activity: Defer     Other Topics Concern   • Not on file     Social History Narrative    Lives with his wife in [place], [place].      Family History   Problem Relation Age of Onset   • Stroke Mother    • Heart disease Mother    • Diabetes Mother    • Heart disease Father    • Alzheimer's disease Father      Review of Systems   Constitution: Negative for chills, fever, malaise/fatigue, night sweats and weight loss.   HENT: Positive for hearing loss. Negative for headaches, odynophagia and sore throat.    Cardiovascular: Positive for chest pain and dyspnea on exertion. Negative for leg swelling, orthopnea and palpitations.   Respiratory: Negative for cough and hemoptysis.    Endocrine: Negative for cold intolerance, heat intolerance, polydipsia, polyphagia and polyuria.   Hematologic/Lymphatic: Bruises/bleeds easily.   Skin: Negative for itching and rash.   Musculoskeletal: Positive for back pain (bone spurs). Negative for joint pain, joint swelling and myalgias.   Gastrointestinal: Negative for abdominal pain, constipation, diarrhea, hematemesis, hematochezia, melena, nausea and vomiting.   Genitourinary: Negative for dysuria, frequency and hematuria.   Neurological: Positive for numbness (right knee to foot). Negative for focal weakness and seizures.   Psychiatric/Behavioral: Negative for suicidal ideas.   All other systems reviewed and are negative.    Vitals:    07/03/17 0748   BP: 130/80   BP Location: Left arm   Patient Position: Sitting   Pulse: 52   Temp: 96.9 °F (36.1 °C)   Weight: 247 lb (112 kg)   Height: 68\" (172.7 cm)      Physical Exam " discussed.    Patient has underlying renal insufficiency which further complicates his heart surgery.  He also has had a previous stroke and the cause was never elucidated.  I have explained to the patient and his wife that coronary surgery is a complex operation that has with it a risk of stroke, bleeding, infection and death and they agreed to proceed.  I have indicated that his risk of stroke might be higher as he has had a previous stroke.  They agreed to proceed.  He will stop Eliquis a few days prior to surgery.         Patient Active Problem List   Diagnosis   • Transient cerebral ischemia   • Diabetes mellitus, type 2   • GERD (gastroesophageal reflux disease)   • Hypertension   • Lyme disease   • Hyperlipidemia   • Coronary artery disease   • CKD (chronic kidney disease), stage III   • Basilar artery stenosis     Signed by: Joey Mcmillan M.D.    7/3/2017    CC:  MD Teresa Means, , editing for Joey Mcmillan M.D.    I, Joey Mcmillan MD, have read and agree with the editing done by Stephanie Monteiro, .      Electronically signed by Joey Mcmillan MD at 7/3/2017 11:13 AM

## 2017-07-17 NOTE — PLAN OF CARE
Problem: Patient Care Overview (Adult)  Goal: Plan of Care Review  Outcome: Ongoing (interventions implemented as appropriate)    07/17/17 1127   Coping/Psychosocial Response Interventions   Plan Of Care Reviewed With patient;family   Patient Care Overview   Progress improving   Outcome Evaluation   Outcome Summary/Follow up Plan [place].ambulat.350 ft.reviewed all written exercises and went with family over home instructions         Problem: Inpatient Physical Therapy  Goal: Bed Mobility Goal LTG- PT  Outcome: Ongoing (interventions implemented as appropriate)    07/13/17 1452 07/17/17 1127   Bed Mobility PT LTG   Bed Mobility PT LTG, Date Established 07/13/17 --    Bed Mobility PT LTG, Time to Achieve 2 wks --    Bed Mobility PT LTG, Activity Type supine to sit/sit to supine --    Bed Mobility PT LTG, [place] Level independent --    Bed Mobility PT LTG, Outcome --  goal ongoing       Goal: Transfer Training Goal 1 LTG- PT  Outcome: Ongoing (interventions implemented as appropriate)    07/13/17 1452 07/17/17 1127   Transfer Training PT LTG   Transfer Training PT LTG, Date Established 07/13/17 --    Transfer Training PT LTG, Time to Achieve 2 wks --    Transfer Training PT LTG, Activity Type sit to stand/stand to sit --    Transfer Training PT LTG, [place] Level independent --    Transfer Training PT LTG, Outcome --  goal ongoing       Goal: Gait Training Goal LTG- PT  Outcome: Ongoing (interventions implemented as appropriate)    07/13/17 1452 07/17/17 1127   Gait Training PT LTG   Gait Training Goal PT LTG, Date Established 07/13/17 --    Gait Training Goal PT LTG, Time to Achieve 2 wks --    Gait Training Goal PT LTG, [place] Level independent --    Gait Training Goal PT LTG, Distance to Achieve 400 --    Gait Training Goal PT LTG, Outcome --  goal ongoing

## 2017-07-17 NOTE — PROGRESS NOTES
[facility]       LOS: 5 days   Patient Care Team:  Provider Not In System as PCP - General        Subjective       Patient Denies:  Cp, sob, palps.  Wants to go home      Vital Signs  Temp:  [98.7 °F (37.1 °C)-99.2 °F (37.3 °C)] 99.2 °F (37.3 °C)  Heart Rate:  [] 90  Resp:  [18-20] 20  BP: (136-160)/(78-90) 160/84    Intake/Output Summary (Last 24 hours) at 07/17/17 1057  Last data filed at 07/17/17 0935   Gross per 24 hour   Intake              240 ml   Output              950 ml   Net             -710 ml     I/O this shift:  In: -   Out: 300 [Urine:300]    Physical Exam:     General Appearance:    A&O, in no acute distress       Neck:   No adenopathy, supple, trachea midline, no thyromegaly, no JVD       Lungs:     Clear to auscultation anteriorly,respirations regular, even and                  unlabored    Heart:    Regular rhythm and normal rate, normal S1 and S2, no            murmur, no gallop, no rub, no click   Chest Wall:    No abnormalities observed   Abdomen:     Normal bowel sounds, no masses, no organomegaly, soft               Extremities:   No edema, no cyanosis, no redness   Pulses:   Pulses palpable and equal bilaterally     Results Review:     I reviewed the patient's new clinical results.      WBC WBC   Date/Time Value Ref Range Status   07/15/2017 0247 11.43 (H) 3.50 - 10.80 10*3/mm3 Final            HGB Hemoglobin   Date/Time Value Ref Range Status   07/15/2017 0247 9.3 (L) 13.1 - 17.5 g/dL Final           HCT Hematocrit   Date/Time Value Ref Range Status   07/15/2017 0247 28.0 (L) 38.9 - 50.9 % Final            Platlets No results found for: LABPLAT  Sodium  Sodium   Date/Time Value Ref Range Status   07/17/2017 0936 136 132 - 146 mmol/L Final   07/16/2017 0400 135 132 - 146 mmol/L Final   07/15/2017 0247 133 132 - 146 mmol/L Final     Potassium  Potassium   Date/Time Value Ref Range Status   07/17/2017 0936 4.1 3.5 - 5.5  mmol/L Final   07/16/2017 0400 4.8 3.5 - 5.5 mmol/L Final   07/15/2017 0247 4.9 3.5 - 5.5 mmol/L Final     Chloride  Chloride   Date/Time Value Ref Range Status   07/17/2017 0936 100 99 - 109 mmol/L Final   07/16/2017 0400 102 99 - 109 mmol/L Final   07/15/2017 0247 101 99 - 109 mmol/L Final     BicarbonateNo results found for: PLASMABICARB    BUN BUN   Date/Time Value Ref Range Status   07/17/2017 0936 29 (H) 9 - 23 mg/dL Final   07/16/2017 0400 37 (H) 9 - 23 mg/dL Final   07/15/2017 0247 32 (H) 9 - 23 mg/dL Final      Creatinine Creatinine   Date/Time Value Ref Range Status   07/17/2017 0936 1.60 (H) 0.60 - 1.30 mg/dL Final   07/16/2017 0400 2.00 (H) 0.60 - 1.30 mg/dL Final   07/15/2017 0247 2.00 (H) 0.60 - 1.30 mg/dL Final      Calcium Calcium   Date/Time Value Ref Range Status   07/17/2017 0936 10.3 8.7 - 10.4 mg/dL Final   07/16/2017 0400 10.3 8.7 - 10.4 mg/dL Final   07/15/2017 0247 9.7 8.7 - 10.4 mg/dL Final      Mag No results found for: MG        PT/INR:    No results found for: PROTIME/  No results found for: INR  Troponin I     Lab Results   Component Value Date    TROPONINI 0.55 06/30/2014         amLODIPine 5 mg Oral Q24H   aspirin 325 mg Oral Daily   atorvastatin 40 mg Oral Nightly   famotidine 20 mg Oral BID   insulin detemir 35 Units Subcutaneous Q12H   insulin lispro 0-7 Units Subcutaneous 4x Daily With Meals & Nightly   insulin lispro 10 Units Subcutaneous TID With Meals   losartan 50 mg Oral Q24H   metoprolol tartrate 25 mg Oral Q12H   pharmacy consult - MTM  Does not apply Daily   sennosides-docusate sodium 2 tablet Oral BID   tamsulosin 0.4 mg Oral Daily          Assessment/Plan     Patient Active Problem List   Diagnosis Code   • Transient cerebral ischemia G45.9   • Diabetes mellitus, type 2, uncontrolled with A1c of 9.7 E11.9   • GERD (gastroesophageal reflux disease) K21.9   • Hypertension I10   • Lyme disease A69.20   • Hyperlipidemia E78.5   • Coronary artery disease I25.10   • CKD  (chronic kidney disease), stage III N18.3   • Basilar artery stenosis I65.1   • CAD (coronary artery disease), status post CABG ×4 on 07/12/2017. I25.10     CV stable  Increase bp meds  F/u 6 weeks  Ok home    JANAK Batista  07/17/17  10:57 AM

## 2017-07-17 NOTE — THERAPY TREATMENT NOTE
Acute Care - Physical Therapy Treatment Note  [facility]     Patient Name: Jakob Boogie  : 1950  MRN: [id number]  Today's Date: 2017  Onset of Illness/Injury or Date of Surgery Date: 17  Date of Referral to PT: 17  Referring Physician: MD Hipolito    Admit Date: 2017    Visit Dx:    ICD-10-CM ICD-9-CM   1. Impaired functional mobility, balance, gait, and endurance Z74.09 V49.89   2. Coronary artery disease involving native coronary artery of native heart with angina pectoris I25.119 414.01     413.9     Patient Active Problem List   Diagnosis   • Transient cerebral ischemia   • Diabetes mellitus, type 2, uncontrolled with A1c of 9.7   • GERD (gastroesophageal reflux disease)   • Hypertension   • Lyme disease   • Hyperlipidemia   • Coronary artery disease   • CKD (chronic kidney disease), stage III   • Basilar artery stenosis   • CAD (coronary artery disease), status post CABG ×4 on 2017.               Adult Rehabilitation Note       17 1005 07/15/17 1012       Rehab Assessment/Intervention    Discipline physical therapy assistant  -UD physical therapist  -     Document Type therapy note (daily note)  -UD      Subjective Information agree to therapy;no complaints  -UD      Patient Effort, Rehab Treatment good  -UD      Symptoms Noted During/After Treatment fatigue  -UD      Precautions/Limitations fall precautions;cardiac precautions;sternal precautions  -UD fall precautions;cardiac precautions;sternal precautions  -SJ     Specific Treatment Considerations [place]  -UD      Recorded by [UD] Kailey Metz PTA [SJ] Jordana Fleming PT     Vital Signs    Pre Systolic BP Rehab 160  -  -SJ     Pre Treatment Diastolic BP 84  -UD 69  -SJ     Post Systolic BP Rehab 169  -UD      Post Treatment Diastolic BP 91  -UD      Pretreatment Heart Rate (beats/min) 82  -UD 81  -SJ     Posttreatment Heart Rate (beats/min) 95  -UD      Pre SpO2 (%) 96  -UD 99  -SJ     O2 Delivery Pre  Treatment room air  -UD supplemental O2  -SJ     Post SpO2 (%) 97  -UD      O2 Delivery Post Treatment room air  -UD      Pre Patient Position Sitting  -UD Sitting  -SJ     Intra Patient Position Standing  -UD Standing  -SJ     Post Patient Position Sitting  -UD Sitting  -SJ     Recorded by [UD] Kailey Metz PTA [SJ] Jordana Fleming, PT     Pain Assessment    Pain Assessment No/denies pain  -UD No/denies pain  -SJ     Pain Score 0  -UD 0  -SJ     Post Pain Score 0  -UD 0  -SJ     Recorded by [UD] Kailey Metz PTA [SJ] Jordana Fleming, PT     Cognitive Assessment/Intervention    Current Cognitive/Communication Assessment  functional  -SJ     Orientation Status oriented x 4  -UD oriented x 4  -SJ     Follows Commands/Answers Questions 100% of the time  -% of the time;able to follow single-step instructions;needs cueing;needs increased time  -SJ     Personal Safety  mild impairment;decreased awareness, need for assist;decreased awareness, need for safety  -SJ     Personal Safety Interventions  fall prevention program maintained;gait belt;muscle strengthening facilitated;nonskid shoes/slippers when out of bed  -SJ     Recorded by [UD] Kailey Metz PTA [SJ] Jordana Fleming, PT     Bed Mobility, Assessment/Treatment    Bed Mob, Supine to Sit, Park not tested   up in chair  -UD      Bed Mobility, Comment  UIC  -SJ     Recorded by [UD] Kailey Metz PTA [SJ] Jordana Fleming, PT     Transfer Assessment/Treatment    Transfers, Sit-Stand Park contact guard assist  -UD contact guard assist;verbal cues required  -SJ     Transfers, Stand-Sit Park contact guard assist  -UD contact guard assist;verbal cues required  -SJ     Transfers, Sit-Stand-Sit, Assist Device  rolling walker  -SJ     Transfer, Safety Issues  weight-shifting ability decreased  -SJ     Transfer, Impairments  strength decreased  -SJ     Transfer, Comment  cues for sternal precautions  -SJ     Recorded by [UD] Kailey Metz PTA [SJ]  Jordana Fleming PT     Gait Assessment/Treatment    Gait, [place] Level minimum assist (75% patient effort)  -UD minimum assist (75% patient effort);verbal cues required  -SJ     Gait, Assistive Device  rolling walker  -SJ     Gait, Distance (Feet) 350  -  -SJ     Gait, Gait Pattern Analysis  swing-to gait  -SJ     Gait, Gait Deviations thuy decreased;step length decreased  -UD thuy decreased;decreased heel strike;double stance time increased;forward flexed posture;narrow base  -SJ     Gait, Safety Issues step length decreased  -UD step length decreased;supplemental O2;weight-shifting ability decreased;balance decreased during turns  -SJ     Gait, Impairments strength decreased  -UD strength decreased;impaired balance  -SJ     Gait, Comment  Slow shuffling gait, req max cues to increase step length  -     Recorded by [UD] Kailey Metz PTA [SJ] Jordana Fleming PT     Balance Skills Training    Sitting-Level of Assistance  Close supervision  -     Sitting-Balance Support  Right upper extremity supported;Left upper extremity supported;Feet supported  -     Standing-Level of Assistance  Contact guard  -     Static Standing Balance Support  assistive device  -     Gait Balance-Level of Assistance  Minimum assistance  -     Gait Balance Support  assistive device  -     Recorded by  [SJ] Jordana Fleming PT     Therapy Exercises    Bilateral Lower Extremities AROM:;10 reps;sitting  -UD      Bilateral Upper Extremity AROM:;10 reps;sitting  -UD      Recorded by [UD] Kailey Metz PTA      Positioning and Restraints    Pre-Treatment Position sitting in chair/recliner  -UD sitting in chair/recliner  -SJ     Post Treatment Position chair  -UD chair  -SJ     In Chair notified nsg;reclined;sitting;call light within reach;with family/caregiver;legs elevated  -UD reclined;notified nsg;call light within reach;encouraged to call for assist;with nsg;waffle cushion;heels elevated  -SJ     Recorded by  [UD] Kailey Metz, PTA [SJ] Jordana Fleming, PT       User Key  (r) = Recorded By, (t) = Taken By, (c) = Cosigned By    Initials Name Effective Dates    UD Kailey Metz, PTA 06/22/15 -     SJ Jordana Fleming, PT 06/19/15 -                 IP PT Goals       07/17/17 1127 07/14/17 1027 07/13/17 1452    Bed Mobility PT LTG    Bed Mobility PT LTG, Date Established   07/13/17  -LS (r) KR (t) LS (c)    Bed Mobility PT LTG, Time to Achieve   2 wks  -LS (r) KR (t) LS (c)    Bed Mobility PT LTG, Activity Type   supine to sit/sit to supine  -LS (r) KR (t) LS (c)    Bed Mobility PT LTG, [place] Level   independent  -LS (r) KR (t) LS (c)    Bed Mobility PT LTG, Outcome goal ongoing  -UD goal ongoing  -SUKHWINDER (r) KR (t) SUKHWINDER (c)     Transfer Training PT LTG    Transfer Training PT LTG, Date Established   07/13/17  -LS (r) KR (t) LS (c)    Transfer Training PT LTG, Time to Achieve   2 wks  -LS (r) KR (t) LS (c)    Transfer Training PT LTG, Activity Type   sit to stand/stand to sit  -LS (r) KR (t) LS (c)    Transfer Training PT LTG, [place] Level   independent  -LS (r) KR (t) LS (c)    Transfer Training PT LTG, Outcome goal ongoing  -UD goal ongoing  -SUKHWINDER (r) KR (t) SUKHWINDER (c)     Gait Training PT LTG    Gait Training Goal PT LTG, Date Established   07/13/17  -LS (r) KR (t) LS (c)    Gait Training Goal PT LTG, Time to Achieve   2 wks  -LS (r) KR (t) LS (c)    Gait Training Goal PT LTG, [place] Level   independent  -LS (r) KR (t) LS (c)    Gait Training Goal PT LTG, Distance to Achieve   400  -LS (r) KR (t) LS (c)    Gait Training Goal PT LTG, Outcome goal ongoing  -UD goal ongoing  -SUKHWINDER (r) KR (t) SUKHWINDER (c)       User Key  (r) = Recorded By, (t) = Taken By, (c) = Cosigned By    Initials Name Provider Type    SUKHWINDER Wong, PT Physical Therapist    YESSENIA Metz, PTA Physical Therapy Assistant    LS Abbie Lloyd, PT Physical Therapist    TANI Mi, PT Student PT Student          Physical Therapy Education      Title: PT OT SLP Therapies (Done)     Topic: Physical Therapy (Done)     Point: Mobility training (Done)    Learning Progress Summary    Learner Readiness Method Response Comment Documented by Status   Patient Acceptance E,D,H DU,[facility] 07/17/17 1127 Done    Acceptance E,D NR   07/15/17 1148 Active    Acceptance E NR   07/14/17 1027 Active    Acceptance E NR   07/13/17 1801 Active    Acceptance E NR   07/13/17 1452 Active   Family Acceptance E,D,H TAMARA,[facility] 07/17/17 1127 Done               Point: Home exercise program (Done)    Learning Progress Summary    Learner Readiness Method Response Comment Documented by Status   Patient Acceptance E,D,H DU,[facility] 07/17/17 1127 Done    Acceptance E,D NR   07/15/17 1148 Active    Acceptance E NR   07/13/17 1801 Active   Family Acceptance E,D,H TAMARA,[facility] 07/17/17 1127 Done               Point: Body mechanics (Done)    Learning Progress Summary    Learner Readiness Method Response Comment Documented by Status   Patient Acceptance E,D,H DU,[facility] 07/17/17 1127 Done    Acceptance E,D NR   07/15/17 1148 Active    Acceptance E NR   07/14/17 1027 Active    Acceptance E NR   07/13/17 1801 Active    Acceptance E NR   07/13/17 1452 Active   Family Acceptance E,D,H TAMARA,[facility] 07/17/17 1127 Done               Point: Precautions (Done)    Learning Progress Summary    Learner Readiness Method Response Comment Documented by Status   Patient Acceptance E,D,H DU,[facility] 07/17/17 1127 Done    Acceptance E,D NR   07/15/17 1148 Active    Acceptance E NR   07/14/17 1027 Active    Acceptance E NR   07/13/17 1801 Active    Acceptance E NR   07/13/17 1452 Active   Family Acceptance E,D,H TAMARA,[facility] 07/17/17 1127 Done                      User Key     Initials Effective Dates Name Provider Type Discipline     06/22/15 -  Kailey Metz PTA Physical Therapy Assistant PT     06/19/15 -  Jordana Fleming PT Physical Therapist PT     06/16/16 -  LAMONTE Wells RN  Registered Nurse Nurse    TANI 05/30/17 -  Adelina Mi, PT Student PT Student PT                    PT Recommendation and Plan  Anticipated Discharge Disposition: home with assist, home with home health  PT Frequency: daily  Plan of Care Review  Plan Of Care Reviewed With: patient, family  Progress: improving  Outcome Summary/Follow up Plan: Pt [place].ambulat.350 ft.reviewed all written exercises and went with family over home instructions          Outcome Measures       07/17/17 1005 07/15/17 1012       How much help from another person do you currently need...    Turning from your back to your side while in flat bed without using bedrails? 3  -UD 3  -SJ     Moving from lying on back to sitting on the side of a flat bed without bedrails? 3  -UD 3  -SJ     Moving to and from a bed to a chair (including a wheelchair)? 3  -UD 3  -SJ     Standing up from a chair using your arms (e.g., wheelchair, bedside chair)? 3  -UD 3  -SJ     Climbing 3-5 steps with a railing? 2  -UD 2  -SJ     To walk in hospital room? 3  -UD 3  -SJ     AM-PAC 6 Clicks Score 17  -UD 17  -SJ     Functional Assessment    Outcome Measure Options  AM-PAC 6 Clicks Basic Mobility (PT)  -SJ       User Key  (r) = Recorded By, (t) = Taken By, (c) = Cosigned By    Initials Name Provider Type    YESSENIA Metz PTA Physical Therapy Assistant     Jordana Fleming, PT Physical Therapist           Time Calculation:         PT Charges       07/17/17 1129          Time Calculation    PT Received On 07/17/17  -UD      PT Goal Re-Cert Due Date 07/23/17  -UD      Time Calculation- PT    Total Timed Code Minutes- PT 25 minute(s)  -        User Key  (r) = Recorded By, (t) = Taken By, (c) = Cosigned By    Initials Name Provider Type    YESSENIA Metz PTA Physical Therapy Assistant          Therapy Charges for Today     Code Description Service Date Service Provider Modifiers Qty    [id number] HC PT THER PROC EA 15 MIN 7/17/2017 Kailey Metz PTA GP 1     [id number]  GAIT TRAINING EA 15 MIN 7/17/2017 Kailey Metz, PTA GP 1          PT G-Codes  Outcome Measure Options: AM-PAC 6 Clicks Basic Mobility (PT)    Kailey Metz, DONNA  7/17/2017

## 2017-07-21 NOTE — DISCHARGE SUMMARY
CTS Discharge Summary    Patient Care Team:  Provider Not In System as PCP - General      Date of Admission: 7/12/2017  5:36 AM  Date of Discharge:  07/17/2017    Discharge Diagnosis  Past Medical History:   Diagnosis Date   • Abdominal hernia    • Arthritis    • Atrial fibrillation    • BPH (benign prostatic hypertrophy)    • CKD (chronic kidney disease), stage III     SEES DR ANDERSON IN [place], BUN 35, CR 1.7    • Coronary artery disease    • Diabetes mellitus, type 2     TYPE 2, CHECKS FSBG BID, LAST A1C 5-2017 8.5    • GERD (gastroesophageal reflux disease)    • [place] (hard of hearing)    • Hyperlipidemia    • Hypertension    • TIA (transient ischemic attack) 05/13/2017    SLIGHT RIGHT SIDED WEAKNESS FROM KNEE TO HIP    • Wears reading eyeglasses        Principal Problem:    CAD (coronary artery disease), status post CABG ×4 on 07/12/2017.  Active Problems:    Transient cerebral ischemia    Diabetes mellitus, type 2, uncontrolled with A1c of 9.7    Hypertension    Hyperlipidemia    CKD (chronic kidney disease), stage III      History of Present IllnessPatient was referred to me to evaluate for coronary bypass grafting surgery.  Patient has a history of previous coronary artery stenting.  Recently he states with minimal activity he has substernal chest pain that he describes as tight and heavy.  He said it radiates into both arms and is associated with shortness of breath.  He states that on one occasion he had associated nausea but that is only been on 1 occasion.  When the pain occurs and he will rest he says it takes at least 10 minutes for the pain to totally resolved.  He has had a recent cardiac catheterization demonstrating significant coronary artery disease and I was asked see and evaluate for surgery. It should also be noted that he had had a transient ischemic attack in the past with slurred speech which resolved.  The carotids were evaluated and echocardiogram was performed and no cause of this  transient ischemic attack was never elucidated.  He also has had no documented atrial fibrillation.        Hospital Course  Patient is a 67 y.o. male admitted for coronary artery bypass grafting.  The patient was taken the operating suite on 07/12/2017 and underwent coronary artery bypass grafting ×4.  Patient tolerated procedure well.  Came off bypass promptly.  Was closed.  And taken to the cardiothoracic unit in stable condition.  Postop was seen by the hospital intensivist on the intensive care unit.  Also seen by Dr. Eamon Mosqueda for hypertension.  Postop day #1 patient awake alert next abated.  Hemodynamically stable [place] catheter was removed.  Chest tubes with minimal drainage.  On day 2 postop the patient's chest tubes continue with minimal drainage they were able E removed along with the pacing wires however the patient's a creatinine went up to 2.1 and with that the patient was left in the intensive care unit he was also on some Cardene secondary to hypertension.  Patient hemodynamically stable overnight was transferred up to telemetry there he worked well with physical therapy and was able to be discharged home the following day on 07/17/2017.    Procedures Performed  Procedure(s):  MEDIAN STERNOTOMY,CORONARY ARTERY BYPASS WITH INTERNAL MAMMARY ARTERY GRAFT x4 WITH ENDOSCOPIC VEIN HARVESTING OF THE RIGHT GREATER SAPHENOUS VEIN       Consults:   Consults     No orders found from 6/13/2017 to 7/13/2017.            Discharge Medications   Jakob Boogie   Home Medication Instructions JANINE:[id number]    Printed on:07/21/17 5520   Medication Information                      acetaminophen (TYLENOL) 500 MG tablet  Take 1,000 mg by mouth Every 6 (Six) Hours As Needed for Mild Pain (1-3).             amLODIPine (NORVASC) 5 MG tablet  Take 5 mg by mouth Daily.             apixaban (ELIQUIS) 5 MG tablet tablet  Take 5 mg by mouth Every 12 (Twelve) Hours.             aspirin 81 MG EC tablet  Take 81 mg  by mouth Daily.             atorvastatin (LIPITOR) 40 MG tablet  Take 1 tablet by mouth Every Night.             fenofibrate (TRICOR) 48 MG tablet  Take 48 mg by mouth Every Night.             glipiZIDE (GLUCOTROL) 10 MG tablet  Take 10 mg by mouth 2 (Two) Times a Day Before Meals.             hydrochlorothiazide (HYDRODIURIL) 25 MG tablet  Take 25 mg by mouth Daily.             HYDROcodone-acetaminophen (NORCO) 5-325 MG per tablet  Take 1 tablet by mouth Every 6 (Six) Hours As Needed for Moderate Pain  for up to 10 days.             Insulin Glargine (BASAGLAR KWIKPEN SC)  Inject 40 Units under the skin 2 (Two) Times a Day.             losartan (COZAAR) 100 MG tablet  Take 100 mg by mouth Daily.             metoprolol tartrate (LOPRESSOR) 25 MG tablet  Take 25 mg by mouth Every 12 (Twelve) Hours.             tamsulosin (FLOMAX) 0.4 MG capsule 24 hr capsule  Take 0.4 mg by mouth Every Night.             traZODone (DESYREL) 150 MG tablet  Take 150 mg by mouth Every Night.                 Discharge Diet:   Diet Instructions     Cardiac / diabetic diet as prescribed               Activity at Discharge:   Activity Instructions     Activity as directed.               Follow-up Appointments  Future Appointments  Date Time Provider Department Center   8/16/2017 10:30 AM JANAK Alvarez [facility] None     Discharge summary took longer than 30 minutes to compile.     JANAK Rojas  07/21/17  1:17 PM

## 2017-08-02 ENCOUNTER — TELEPHONE (OUTPATIENT)
Dept: CARDIAC SURGERY | Facility: CLINIC | Age: 67
End: 2017-08-02

## 2017-08-02 NOTE — TELEPHONE ENCOUNTER
Nevaeh Chuyita called today to inform Dr. Mcmillan that the patient has been having severe pain in his back above shoulder blades. Also, he is having pain in his right arm and can hardly lift it. This has been going on for about 3 days. I told his wife to take him to the local ER to be evaluated. She explained to me that a ER visit is too expensive and she would rather take him to his PCP first.

## 2017-08-16 ENCOUNTER — OFFICE VISIT (OUTPATIENT)
Dept: CARDIAC SURGERY | Facility: CLINIC | Age: 67
End: 2017-08-16

## 2017-08-16 VITALS
WEIGHT: 240.8 LBS | DIASTOLIC BLOOD PRESSURE: 66 MMHG | BODY MASS INDEX: 36.49 KG/M2 | HEART RATE: 71 BPM | HEIGHT: 68 IN | SYSTOLIC BLOOD PRESSURE: 102 MMHG | OXYGEN SATURATION: 96 %

## 2017-08-16 DIAGNOSIS — I25.119 CORONARY ARTERY DISEASE INVOLVING NATIVE CORONARY ARTERY OF NATIVE HEART WITH ANGINA PECTORIS (HCC): ICD-10-CM

## 2017-08-16 DIAGNOSIS — Z95.1 S/P CABG (CORONARY ARTERY BYPASS GRAFT): Primary | ICD-10-CM

## 2017-08-16 PROCEDURE — 71020 CHG CHEST X-RAY 2 VW: CPT | Performed by: THORACIC SURGERY (CARDIOTHORACIC VASCULAR SURGERY)

## 2017-08-16 PROCEDURE — 99024 POSTOP FOLLOW-UP VISIT: CPT | Performed by: PHYSICIAN ASSISTANT

## 2017-08-16 NOTE — PROGRESS NOTES
08/16/2017  Patient Information  Jakob Boogie                                                                                          [address] [facility] [address]  [facility] [address]   1950  'PCP/Referring Physician'  Provider Not In System  None  No ref. provider found    Chief Complaint   Patient presents with   • Follow-up     4 wk hosp f/u after CABG. Patient complains of severe right shoulder pain. Otherwise patient has been doing well        History of Present Illness:  Mr. Boogie presents to office today for postoperative follow up of 7/12/17 CABG x 4. The patient denies chest pain or shortness of breath. He patient has been ambulating without any difficulty. The patient is slated to follow up with Dr. Mosqueda later today, and is being followed by his PCP Sangeetha Harp. Mr. Boogie is not certain he would like to participate in cardiac rehab, and may wait a few weeks before starting. The patient is otherwise doing well. He is looking forward to being able to drive again and resume normal physical activity.     Patient Active Problem List   Diagnosis   • Transient cerebral ischemia   • Diabetes mellitus, type 2, uncontrolled with A1c of 9.7   • GERD (gastroesophageal reflux disease)   • Hypertension   • Lyme disease   • Hyperlipidemia   • Coronary artery disease   • CKD (chronic kidney disease), stage III   • Basilar artery stenosis   • CAD (coronary artery disease), status post CABG ×4 on 07/12/2017.     Past Medical History:   Diagnosis Date   • Abdominal hernia    • Arthritis    • Atrial fibrillation    • BPH (benign prostatic hypertrophy)    • CKD (chronic kidney disease), stage III     SEES DR ANDERSON IN [place], BUN 35, CR 1.7    • Coronary artery disease    • Diabetes mellitus, type 2     TYPE 2, CHECKS FSBG BID, LAST A1C 5-2017 8.5    • GERD (gastroesophageal reflux disease)    • Ruby (hard of hearing)    • Hyperlipidemia    • Hypertension    • TIA (transient ischemic attack) 05/13/2017    SLIGHT RIGHT SIDED  WEAKNESS FROM KNEE TO HIP    • Wears reading eyeglasses      Past Surgical History:   Procedure Laterality Date   • AMPUTATION      left index   • CARDIAC CATHETERIZATION N/A 6/30/2017    Procedure: Left Heart Cath;  Surgeon: Eamon Mosqueda MD;  Location:  [facility];  Service:    • CHOLECYSTECTOMY     • COLONOSCOPY     • CORONARY ARTERY BYPASS GRAFT N/A 7/12/2017    Procedure: MEDIAN STERNOTOMY,CORONARY ARTERY BYPASS WITH INTERNAL MAMMARY ARTERY GRAFT x4 WITH ENDOSCOPIC VEIN HARVESTING OF THE RIGHT GREATER SAPHENOUS VEIN;  Surgeon: Joey Mcmillan MD;  Location:  [facility] OR;  Service:    • CORONARY STENT PLACEMENT      2 stents placed   • ENDOSCOPY         Current Outpatient Prescriptions:   •  acetaminophen (TYLENOL) 500 MG tablet, Take 1,000 mg by mouth Every 6 (Six) Hours As Needed for Mild Pain (1-3)., Disp: , Rfl:   •  amLODIPine (NORVASC) 5 MG tablet, Take 5 mg by mouth Daily., Disp: , Rfl:   •  apixaban (ELIQUIS) 5 MG tablet tablet, Take 5 mg by mouth Every 12 (Twelve) Hours., Disp: , Rfl:   •  aspirin 81 MG EC tablet, Take 81 mg by mouth Daily., Disp: , Rfl:   •  atorvastatin (LIPITOR) 40 MG tablet, Take 1 tablet by mouth Every Night., Disp: 30 tablet, Rfl: 12  •  fenofibrate (TRICOR) 48 MG tablet, Take 48 mg by mouth Every Night., Disp: , Rfl:   •  glipiZIDE (GLUCOTROL) 10 MG tablet, Take 10 mg by mouth 2 (Two) Times a Day Before Meals., Disp: , Rfl:   •  hydrochlorothiazide (HYDRODIURIL) 25 MG tablet, Take 25 mg by mouth Daily., Disp: , Rfl:   •  Insulin Glargine (BASAGLAR KWIKPEN SC), Inject 40 Units under the skin 2 (Two) Times a Day., Disp: , Rfl:   •  losartan (COZAAR) 100 MG tablet, Take 100 mg by mouth Daily., Disp: , Rfl:   •  metoprolol tartrate (LOPRESSOR) 25 MG tablet, Take 25 mg by mouth Every 12 (Twelve) Hours., Disp: , Rfl:   •  tamsulosin (FLOMAX) 0.4 MG capsule 24 hr capsule, Take 0.4 mg by mouth Every Night., Disp: , Rfl:   •  traZODone (DESYREL) 150 MG tablet,  "Take 150 mg by mouth Every Night., Disp: , Rfl:   No current facility-administered medications for this visit.     Facility-Administered Medications Ordered in Other Visits:   •  Chlorhexidine Gluconate Cloth 2 % pads 1 application, 1 application, Topical, Q12H PRN, JANAK Alvarez  Allergies   Allergen Reactions   • Bactrim [Sulfamethoxazole-Trimethoprim] Hives     On patients arms, per wife \"look like mosquito bites\"     Social History     Social History   • Marital status:      Spouse name: N/A   • Number of children: 1   • Years of education: N/A     Occupational History   • self employed-[employer]      Social History Main Topics   • Smoking status: Never Smoker   • Smokeless tobacco: Never Used   • Alcohol use No   • Drug use: No   • Sexual activity: Defer     Other Topics Concern   • Not on file     Social History Narrative    Lives with his wife in [place], [place].      Family History   Problem Relation Age of Onset   • Stroke Mother    • Heart disease Mother    • Diabetes Mother    • Heart disease Father    • Alzheimer's disease Father      ROS: As per HPI, otherwise negative.   Vitals:    17 1039   BP: 102/66   BP Location: Right arm   Patient Position: Sitting   Pulse: 71   SpO2: 96%   Weight: 240 lb 12.8 oz (109 kg)   Height: 67.5\" (171.5 cm)      Physical Exam:  Gen - NAD, pleasant, cooperative  CV - Regular rate and rhythm, no murmur gallop or rub  Pulm - Lungs clear to auscultation without wheeze or rhonchi   GI - Soft, normoactive bowel sounds, non-tender  Ext - Without edema, right EVH site healing well  Incision - Sternum stable, no evidence of incisional dehiscence or cellulitis    Labs/Imagin17 CXR  Good quality chest radiograph. Bony structures are within normal limits. Trachea is midline. Left and right lung fields clear, costophrenic angles are sharp. Sternal wires are manifestation of CABG procedure. No evidence of pleural effusion or pneumothorax. Compared to most recent " chest xray (7/14/17), there is no acute chest pathology.     Assessment/Plan:  Patient is doing well post-operatively. Sternum is stable and the patient is ambulating often. They may resume driving now and start normal activity near mid October. Dr. Mosqueda will manage cardiovascular medications. He is encouraged to participate in cardiac rehab. Patient may follow up on a PRN basis. He may call our office with any questions or concerns.        Patient Active Problem List   Diagnosis   • Transient cerebral ischemia   • Diabetes mellitus, type 2, uncontrolled with A1c of 9.7   • GERD (gastroesophageal reflux disease)   • Hypertension   • Lyme disease   • Hyperlipidemia   • Coronary artery disease   • CKD (chronic kidney disease), stage III   • Basilar artery stenosis   • CAD (coronary artery disease), status post CABG ×4 on 07/12/2017.     Signed by:

## (undated) DEVICE — SUT PDS 1 CTX 36IN VIO PDP371T

## (undated) DEVICE — SUT PROLN 7/0 CV BV1 24IN 8304H BX/36

## (undated) DEVICE — PK CATH CARD 10

## (undated) DEVICE — BNDG ELAS CO-FLEX SLF ADHR 3IN5YD LF2 STRL

## (undated) DEVICE — 12 FOOT DISPOSABLE EXTENSION CABLE WITH SAFE CONNECT / SCREW-DOWN

## (undated) DEVICE — SUT SILK 4/0 TIES 18IN A183H

## (undated) DEVICE — PENCL E/S HNDSWCH ROCKRBTN HOLSTR 10FT

## (undated) DEVICE — SUT SILK 2 SUTUPAK TIE 60IN SA8H 2STRAND

## (undated) DEVICE — FLTR HME STR UNIV W/SMPL PORT

## (undated) DEVICE — CATH DIAG EXPO M/ PK 5F FL4/FR4 PIG

## (undated) DEVICE — ST INF 10DRP 4 PORT 4WY/STPCOCK 112IN

## (undated) DEVICE — 32 FR RIGHT ANGLE – SOFT PVC CATHETER: Brand: PVC THORACIC CATHETERS

## (undated) DEVICE — DUAL LUMEN STOMACH TUBE,ANTI-REFLUX VALVE: Brand: SALEM SUMP

## (undated) DEVICE — SUT PROLN 3/0 SH D/A 36IN 8522H

## (undated) DEVICE — NDL PERC 1PRT THNWALL W/BASEPLT 18G 7CM

## (undated) DEVICE — CANN VESL DLP 1WY BLNT/TP 3MM

## (undated) DEVICE — PK SPECIALTYCARE M/STATE 1 OH 1/2V CUST

## (undated) DEVICE — SUT PROLN 6/0 C1 D/A 30IN 8706H

## (undated) DEVICE — 3M™ TEGADERM™ TRANSPARENT FILM DRESSING, 1626W, 4 IN X 4-3/4 IN (10 CM X 12 CM), 50 EACH/CARTON, 4 CARTON/CASE: Brand: 3M™ TEGADERM™

## (undated) DEVICE — MEDI-VAC NON-CONDUCTIVE SUCTION TUBING: Brand: CARDINAL HEALTH

## (undated) DEVICE — FLTR GAS LN OXYGENATOR 1/4IN STRL

## (undated) DEVICE — DRSNG SURESITE WNDW 4X4.5

## (undated) DEVICE — GW INQWIRE FC PTFE STD J/1.5 .035 260

## (undated) DEVICE — PRESSURE MONITORING SET: Brand: TRUWAVE

## (undated) DEVICE — SEALANT HEMO TACHOSIL FIBRIN PTCH 9.5X4.8CM

## (undated) DEVICE — PRESSURE MONITORING ACCESSORY: Brand: TRUWAVE

## (undated) DEVICE — A2000 MULTI-USE SYRINGE KIT, P/N 701277-003KIT CONTENTS: 100ML CONTRAST RESERVOIR AND TUBING WITH CONTRAST SPIKE AND CLAMP: Brand: A2000 MULTI-USE SYRINGE KIT

## (undated) DEVICE — TOWEL,OR,DSP,ST,BLUE,STD,8/PK,10PK/CS: Brand: MEDLINE

## (undated) DEVICE — DEV COMP RAD PRELUDESYNC 24CM

## (undated) DEVICE — 32 FR STRAIGHT – SOFT PVC CATHETER: Brand: PVC THORACIC CATHETERS

## (undated) DEVICE — TUBING, SUCTION, 1/4" X 10', STRAIGHT: Brand: MEDLINE

## (undated) DEVICE — SOL NS 500ML

## (undated) DEVICE — WIPE THERAWASH SLV SPEC CARE 2PK

## (undated) DEVICE — BG TRANSF W/COUPLER SPK 600ML

## (undated) DEVICE — AIRWY SZ11

## (undated) DEVICE — 3M™ RANGER™ BLOOD/FLUID WARMING STANDARD FLOW SET, 24200, 10/CASE: Brand: 3M™ RANGER™

## (undated) DEVICE — SOL NACL 0.9PCT 1000ML

## (undated) DEVICE — PK HEART OPN 10

## (undated) DEVICE — INTRAOPERATIVE COVER KIT, 10 PACK: Brand: SITE-RITE

## (undated) DEVICE — AVANTI + 4F STD W/GW: Brand: AVANTI

## (undated) DEVICE — CANN AORT ROOT DLP VNT 14G 7F

## (undated) DEVICE — ST PRIM GRVTY NDLESS 3 INJ PORT 105IN

## (undated) DEVICE — VASOVIEW HEMOPRO: Brand: VASOVIEW HEMOPRO

## (undated) DEVICE — SENSR CERBRL O2 SOMASENSOR ADHS A/ LF

## (undated) DEVICE — CONNECTOR PH 6-IN-1 Y ST: Brand: CARDINAL HEALTH

## (undated) DEVICE — CONN REDUCING CANN/PUMP 3/8X3/8X3/8

## (undated) DEVICE — SYS SKIN CLS DERMABOND PRINEO W/22CM MESH TP

## (undated) DEVICE — GLIDESHEATH SLENDER STAINLESS STEEL KIT: Brand: GLIDESHEATH SLENDER

## (undated) DEVICE — TEMP PACING WIRE: Brand: MYO/WIRE

## (undated) DEVICE — ANTIBACTERIAL UNDYED BRAIDED (POLYGLACTIN 910), SYNTHETIC ABSORBABLE SUTURE: Brand: COATED VICRYL

## (undated) DEVICE — SUT PROLN 4/0 RB1 D/A 36IN 8557H

## (undated) DEVICE — Device: Brand: MEDEX

## (undated) DEVICE — CATH DIAG EXPO .045 FL3  5F 100CM

## (undated) DEVICE — OASIS DRAIN, SINGLE, INLINE & ATS COMPATIBLE: Brand: OASIS

## (undated) DEVICE — MEDI-VAC YANKAUER SUCTION HANDLE W/BULBOUS TIP: Brand: CARDINAL HEALTH

## (undated) DEVICE — TRAP,MUCUS SPECIMEN,40CC: Brand: MEDLINE

## (undated) DEVICE — MODEL BT2000 P/N 700287-012KIT CONTENTS: MANIFOLD WITH SALINE AND CONTRAST PORTS, SALINE TUBING WITH SPIKE AND HAND SYRINGE, TRANSDUCER: Brand: BT2000 AUTOMATED MANIFOLD KIT

## (undated) DEVICE — MODEL AT P54, P/N 700608-035KIT CONTENTS: HAND CONTROLLER, 3-WAY HIGH-PRESSURE STOPCOCK WITH ROTATING END AND PREMIUM HIGH-PRESSURE TUBING: Brand: ANGIOTOUCH® KIT

## (undated) DEVICE — BNDG ELAS ELITE V/CLOSE 6IN 5YD LF STRL

## (undated) DEVICE — 2000CC GUARDIAN II: Brand: GUARDIAN

## (undated) DEVICE — PRESSURE MONITORING SET: Brand: TRUWAVE, VAMP

## (undated) DEVICE — SAFETY SCALPEL: Brand: DEROYAL

## (undated) DEVICE — PAD LVL SENSR MOUNTING

## (undated) DEVICE — Device

## (undated) DEVICE — SUT PROLN 4/0 SH D/A 36IN 8521H

## (undated) DEVICE — TBG SXN RIGD MINI/SUCKER 9F 4.75IN

## (undated) DEVICE — 3M™ TEGADERM™ TRANSPARENT FILM DRESSING FIRST AID STYLE, 1621, 4 IN X 4-3/4 IN, 50 BAGS/CARTON, 4 CARTONS/CASE: Brand: 3M™ TEGADERM™

## (undated) DEVICE — SUT SILK 0/0 CT2 18IN C027D

## (undated) DEVICE — CANNULA,ADULT,SOFT-TOUCH,7'TUBE,UC: Brand: PENDING